# Patient Record
Sex: FEMALE | Race: WHITE | NOT HISPANIC OR LATINO | URBAN - METROPOLITAN AREA
[De-identification: names, ages, dates, MRNs, and addresses within clinical notes are randomized per-mention and may not be internally consistent; named-entity substitution may affect disease eponyms.]

---

## 2018-11-19 ENCOUNTER — EMERGENCY (EMERGENCY)
Facility: HOSPITAL | Age: 83
LOS: 1 days | Discharge: HOME | End: 2018-11-19
Attending: EMERGENCY MEDICINE | Admitting: EMERGENCY MEDICINE
Payer: MEDICARE

## 2018-11-19 VITALS
TEMPERATURE: 97 F | OXYGEN SATURATION: 99 % | HEART RATE: 63 BPM | SYSTOLIC BLOOD PRESSURE: 150 MMHG | RESPIRATION RATE: 18 BRPM | DIASTOLIC BLOOD PRESSURE: 69 MMHG

## 2018-11-19 VITALS
OXYGEN SATURATION: 100 % | DIASTOLIC BLOOD PRESSURE: 79 MMHG | HEART RATE: 65 BPM | TEMPERATURE: 97 F | SYSTOLIC BLOOD PRESSURE: 154 MMHG | RESPIRATION RATE: 16 BRPM

## 2018-11-19 LAB
ALBUMIN SERPL ELPH-MCNC: 3.4 G/DL — SIGNIFICANT CHANGE UP (ref 3.3–5)
ALP SERPL-CCNC: 94 U/L — SIGNIFICANT CHANGE UP (ref 40–120)
ALT FLD-CCNC: 16 U/L — SIGNIFICANT CHANGE UP (ref 4–33)
AST SERPL-CCNC: 26 U/L — SIGNIFICANT CHANGE UP (ref 4–32)
BILIRUB SERPL-MCNC: 0.9 MG/DL — SIGNIFICANT CHANGE UP (ref 0.2–1.2)
BUN SERPL-MCNC: 19 MG/DL — SIGNIFICANT CHANGE UP (ref 7–23)
CALCIUM SERPL-MCNC: 9.4 MG/DL — SIGNIFICANT CHANGE UP (ref 8.4–10.5)
CHLORIDE SERPL-SCNC: 106 MMOL/L — SIGNIFICANT CHANGE UP (ref 98–107)
CO2 SERPL-SCNC: 23 MMOL/L — SIGNIFICANT CHANGE UP (ref 22–31)
CREAT SERPL-MCNC: 0.85 MG/DL — SIGNIFICANT CHANGE UP (ref 0.5–1.3)
GLUCOSE SERPL-MCNC: 95 MG/DL — SIGNIFICANT CHANGE UP (ref 70–99)
MAGNESIUM SERPL-MCNC: 2.1 MG/DL — SIGNIFICANT CHANGE UP (ref 1.6–2.6)
PHOSPHATE SERPL-MCNC: 3.4 MG/DL — SIGNIFICANT CHANGE UP (ref 2.5–4.5)
POTASSIUM SERPL-MCNC: 5.2 MMOL/L — SIGNIFICANT CHANGE UP (ref 3.5–5.3)
POTASSIUM SERPL-SCNC: 5.2 MMOL/L — SIGNIFICANT CHANGE UP (ref 3.5–5.3)
PROT SERPL-MCNC: 6.4 G/DL — SIGNIFICANT CHANGE UP (ref 6–8.3)
SODIUM SERPL-SCNC: 140 MMOL/L — SIGNIFICANT CHANGE UP (ref 135–145)

## 2018-11-19 PROCEDURE — 70450 CT HEAD/BRAIN W/O DYE: CPT | Mod: 26

## 2018-11-19 PROCEDURE — 72125 CT NECK SPINE W/O DYE: CPT | Mod: 26

## 2018-11-19 PROCEDURE — 72170 X-RAY EXAM OF PELVIS: CPT | Mod: 26

## 2018-11-19 PROCEDURE — 99284 EMERGENCY DEPT VISIT MOD MDM: CPT | Mod: 25

## 2018-11-19 PROCEDURE — 12001 RPR S/N/AX/GEN/TRNK 2.5CM/<: CPT | Mod: RT

## 2018-11-19 PROCEDURE — 71046 X-RAY EXAM CHEST 2 VIEWS: CPT | Mod: 26

## 2018-11-19 NOTE — ED ADULT NURSE NOTE - PMH
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

## 2018-11-19 NOTE — ED PROVIDER NOTE - MUSCULOSKELETAL, MLM
difficulty swallowing/other (specify)/shortness of breath
Spine appears normal, range of motion is not limited, no muscle or joint tenderness

## 2018-11-19 NOTE — ED ADULT NURSE NOTE - OBJECTIVE STATEMENT
Pt w/ hx of htn hld mitral valve prolapse received to rm 2 aaox3 poor historian regarding chronic medical conditions  ambulatory w/ walker at baseline c/o mechanical fall with positive impact to head Pt denies anticoagulant use.  Pt reports she was pushing through a door when it gave way and she fell, then was unable to get up due to weakness.  Pt denies LOC confusion numbness tingling loss of ROM  hip pain back pain.  Pt p/w NAD breaths even unlabored skin warm dry intact with noted exceptions at right biceps small superficial skin tears with large hematoma, smaller hematomas to right forearm, and right posterior ribs, small superficial laceration under chin, raised hematoma over left posterior head is tender to palpation.  Chronic bilateral LE swelling small superficial skin tears over red scaly skin noted.  Will endorse report josi [email or web address]

## 2018-11-19 NOTE — ED PROVIDER NOTE - OBJECTIVE STATEMENT
Patient reached out to open door and fell, striking head.  No LOC now with head pain and posterior swelling, Bleeding from skin tear of right bicep.  no bony tenderness and from of that arm.  Patient denies dizziness, chest pain or SOB.

## 2018-11-19 NOTE — ED ADULT TRIAGE NOTE - CHIEF COMPLAINT QUOTE
pt BIBA from home, pt had unwitnessed fall.  has a lump to the back of head, unknown LOC.  pt denies taking any meds

## 2018-11-19 NOTE — ED PROVIDER NOTE - CARE PLAN
Principal Discharge DX:	Contusion of scalp, initial encounter  Secondary Diagnosis:	Laceration of right upper extremity, initial encounter

## 2018-11-19 NOTE — ED PROVIDER NOTE - MEDICAL DECISION MAKING DETAILS
[place]: mechanical fall, +head trauma. not syncope.  Will ct and make sure to check gait. may glue rt arm lac.

## 2019-11-08 ENCOUNTER — INPATIENT (INPATIENT)
Facility: HOSPITAL | Age: 84
LOS: 9 days | Discharge: NURSING, REHAB OR LONG-TERM CARE | DRG: 674 | End: 2019-11-18
Attending: INTERNAL MEDICINE | Admitting: INTERNAL MEDICINE
Payer: MEDICARE

## 2019-11-08 VITALS
RESPIRATION RATE: 20 BRPM | HEART RATE: 86 BPM | TEMPERATURE: 99 F | OXYGEN SATURATION: 94 % | DIASTOLIC BLOOD PRESSURE: 92 MMHG | SYSTOLIC BLOOD PRESSURE: 145 MMHG

## 2019-11-08 PROCEDURE — 93010 ELECTROCARDIOGRAM REPORT: CPT

## 2019-11-08 PROCEDURE — 99285 EMERGENCY DEPT VISIT HI MDM: CPT | Mod: GC

## 2019-11-08 RX ORDER — TETANUS TOXOID, REDUCED DIPHTHERIA TOXOID AND ACELLULAR PERTUSSIS VACCINE, ADSORBED 5; 2.5; 8; 8; 2.5 [IU]/.5ML; [IU]/.5ML; UG/.5ML; UG/.5ML; UG/.5ML
0.5 SUSPENSION INTRAMUSCULAR ONCE
Refills: 0 | Status: COMPLETED | OUTPATIENT
Start: 2019-11-08 | End: 2019-11-08

## 2019-11-08 NOTE — ED PROVIDER NOTE - DURATION
Daily Note     Today's date: 2019  Patient name: Pita Daniel  : 1967  MRN: [id number]  Referring provider: Latoya Gomez MD  Dx:   Encounter Diagnosis     ICD-10-CM    1  Chronic pain of right ankle M25 571     G89 29                 Subjective: Ben Lynne reported "I am doing excellent, this is the best I have been doing so far "    Objective: See treatment diary below  Precautions: none    Daily Treatment Diary     Manual  2019   IASTM  JR JR          Overpressure REIP             Ankle distraction         D/Michael    STM lumbar spine             Manual Lumbar flex  [facility] [place]   Lateral hip distraction Radha Christianson         Exercise Diary              REIP             Self ankle DF mobs             Neural glides        3x10 3x10 3x10   TrA contractions        3x10 3x10    Calf stretch        3x20" 3x20"    Seated Hamstring stretch             Double knee to chest 5x10 4x10  3x10 3x10 3x10  5x10 5x10 5x10   Standing Right hip flexion lumbar flexion  JR      2x10 D/Michael    X-walks  Red 2x15ft   Green 2x15 Green 2x15       Heel raises    3x8 3x8 3x8       Standing calf stretch    3x15" 3x15" 3x15"                                                                                                               HEP return demonstration and progression                 Modalities                                                         Assessment: Tolerated treatment well  Patient would benefit from continued PT  Ben Lynne demonstrated good understanding of her HEP  Due to her reported progress and independence with her HEP assess for D/C next session as long as improvement continues  Plan: Continue per plan of care  today

## 2019-11-08 NOTE — ED PROVIDER NOTE - UNABLE TO OBTAIN
**ATTENDING ADDENDUM (Dr. Mati Davison): Exploration of CC, HPI and review of systems limited by patient's acuity and chronic dementia. Dementia

## 2019-11-08 NOTE — ED PROVIDER NOTE - CONSTITUTIONAL MENTATION
**ATTENDING ADDENDUM (Dr. Mati Davison): follows commands, responds appropriately, cooperative and interactive./awake/alert

## 2019-11-08 NOTE — ED PROVIDER NOTE - PHYSICAL EXAMINATION
Vitals: WNL  Gen: laying comfortably in NAD  Head: NCAT  ENT: EOMI, pupils equal, nonreactive b/l, sclerae white, anicterus, moist mucous membranes. No exudates. hematoma on R forehead, erythema of R cheek without obvious deformities, no nasal septal hematoma b/l, no cspine midline tenderness, full cspine rom  CV: RRR. Audible S1 and S2. No murmurs, rubs, gallops, S3, nor S4, 2+ radial and DP pulses   Pulm: Clear to auscultation bilaterally. No wheezes, rales, or rhonchi  Abd: soft, normoactive BS x4, NTND, no rebound, no guarding, no rashes  Musculoskeletal:  3+ pitting edema RLE with erythema up to level of mid calf with ttp  Skin: no lesions or scars noted  Neurologic: answers questions appropriately, CN2-12 intact, finger to nose intact, motor/sensation extremities x4 grossly intact  : no CVA tenderness  Psych: normal affect Vitals: WNL  Gen: laying comfortably in NAD  Head: NCAT  ENT: EOMI, pupils equal, nonreactive b/l, sclerae white, anicterus, moist mucous membranes. No exudates. hematoma on R forehead, erythema of R cheek without obvious deformities, no nasal septal hematoma b/l, no cspine midline tenderness, full cspine rom  CV: RRR. Audible S1 and S2. No murmurs, rubs, gallops, S3, nor S4, 2+ radial and DP pulses   Pulm: Clear to auscultation bilaterally. No wheezes, rales, or rhonchi  Abd: soft, normoactive BS x4, NTND, no rebound, no guarding, no rashes  Musculoskeletal:  3+ pitting edema RLE with erythema up to level of mid calf with ttp  Skin: no lesions or scars noted  Neurologic: answers questions appropriately, CN2-12 intact, finger to nose intact, motor/sensation extremities x4 grossly intact  : no CVA tenderness  Psych: normal affect  **ATTENDING ADDENDUM (Dr. Mati Davison): I have reviewed and substantially contributed to the elements of the PE as documented above. I have directly performed an examination of this patient in conjunction with the other members (EM resident/PA/NP) of the patient care team. Of note, and in addition to the above, [place] coma score = 15 (eyes =4, verbal =5, motor =6). NO posturing. NO focal motor weakness. NO sensory changes. Awake, alert, coherent, and interactive.

## 2019-11-08 NOTE — ED PROVIDER NOTE - FAMILY DETAILS FREE TEXT FOR MDM ADDL HISTORY OBTAINED FROM QUESTION
**ATTENDING ADDENDUM (Dr. Mati Davison): family member(s) present during patient's ED visit; corroborated CC, HPI and review of systems as provided by patient.

## 2019-11-08 NOTE — ED PROVIDER NOTE - OBJECTIVE STATEMENT
89F, ?dementia presents s/p fall. pt reports she loss her balance on walker, fell forward hitting head on carpet, no LOC, no asa/ac use. Has not tried to ambulate since. At baseline has RLE swelling which she is supopsed to be taking lasix and torsemide for but has not taken it for months as she does not like having to go to the bathroom all the time. reports RLE redness is new but does not know how long it has been present. + worsening RLE swelling. denies f/c, cp,sob, cough, urinary symptoms, HA, visual changes, hip pain. 89F, ?dementia presents s/p fall. pt reports she loss her balance on walker, fell forward hitting head on carpet, no LOC, no asa/ac use. Has not tried to ambulate since. At baseline has RLE swelling which she is supopsed to be taking lasix and torsemide for but has not taken it for months as she does not like having to go to the bathroom all the time. reports RLE redness is new but does not know how long it has been present. + worsening RLE swelling. denies f/c, cp,sob, cough, urinary symptoms, HA, visual changes, hip pain.  **ATTENDING ADDENDUM (Dr. Mati Davison): I attest that I have directly and personally interviewed and examined this patient and elicited a comparable history of present illness and review of systems as documented, along with my EM resident. I attest that I have made significant contributions to the documentation where necessary and as noted in the EMR.

## 2019-11-08 NOTE — ED PROVIDER NOTE - EYES, MLM
Clear bilaterally, pupils equal, round and reactive to light. **ATTENDING ADDENDUM (Dr. Mati Davison): Extraocular muscle movements intact. Clear corneas bilaterally, pupils equal and round. NO nystagmus. POSITIVE periorbital ecchymoses noted.

## 2019-11-08 NOTE — ED PROVIDER NOTE - MUSCULOSKELETAL, MLM
Spine appears normal, range of motion is not limited **ATTENDING ADDENDUM (Dr. Mati Davison): NO costovertebral angle tenderness. NO cervical-thoracic-lumbar-sacral midline bony tenderness or stepoff noted. Pelvis without deformity or tenderness.

## 2019-11-08 NOTE — ED PROVIDER NOTE - ENMT, MLM
Airway patent, Nasal mucosa clear. Mouth with normal mucosa. Throat has no vesicles, no oropharyngeal exudates and uvula is midline. **ATTENDING ADDENDUM (Dr. Mati Davison): AIRWAY CLEAR. NO pooling of secretions, POSITIVE gag reflex, NO debris, ABLE TO SELF-PROTECT AIRWAY. POSITIVE full range of motion of the mandible. NO temporomandibular joint tenderness with range of motion or palpation. NO dental trauma. NO malocclusion. NO tenderness along the nasal bones or facial bones.

## 2019-11-08 NOTE — ED ADULT TRIAGE NOTE - CHIEF COMPLAINT QUOTE
head injury s/p trip and fall on carpet at home, daughter denies LOC. Also currently has lower leg cellulitis going on. head injury s/p trip and fall on carpet at home, daughter denies LOC and blood thinners. Also currently has lower leg cellulitis going on.

## 2019-11-08 NOTE — ED PROVIDER NOTE - PLAN OF CARE
**ATTENDING ADDENDUM (Dr. Mati Davison): Goals of care include resolution of emergent/urgent symptoms and concerns, and restoration to baseline level of homeostasis.

## 2019-11-08 NOTE — ED ADULT NURSE NOTE - CHIEF COMPLAINT QUOTE
head injury s/p trip and fall on carpet at home, daughter denies LOC and blood thinners. Also currently has lower leg cellulitis going on.

## 2019-11-08 NOTE — ED PROVIDER NOTE - MUSCULOSKELETAL [+], MLM
**ATTENDING ADDENDUM (Dr. Mati Davison): POSITIVE soft-tissue swelling of the right lower extremity with asymmetric edema and erythema

## 2019-11-08 NOTE — ED PROVIDER NOTE - CHIEF COMPLAINT
The patient is a 89y Female complaining of The patient is a 89y Female presenting to the ED s/p fall with right lower extremity soft-tissue swelling (asymmetric) and erythema and sequela of head and body trauma s/p fall (abrasions, bruising, skin tears).

## 2019-11-08 NOTE — ED PROVIDER NOTE - GASTROINTESTINAL, MLM
Abdomen soft, non-tender **ATTENDING ADDENDUM (Dr. Mati Davison): NO guarding, rebound, or rigidity. NO pulsatile or non-pulsatile masses. NO hernias. NO obvious hepatosplenomegaly.

## 2019-11-08 NOTE — ED PROVIDER NOTE - CLINICAL SUMMARY MEDICAL DECISION MAKING FREE TEXT BOX
89F, ?dementia presents s/p fall. pt reports she loss her balance on walker, fell forward hitting head on carpet, no LOC, no asa/ac use. nonfocal neuro exam, given age, will image head. pt also with worsening RLE swelling and erythema, concerns for cellulitis without systemic symptoms. also with RLE swelling wrosening, will r/o DVT 89F, ?dementia presents s/p fall. pt reports she loss her balance on walker, fell forward hitting head on carpet, no LOC, no asa/ac use. nonfocal neuro exam, given age, will image head. pt also with worsening RLE swelling and erythema, concerns for cellulitis without systemic symptoms. also with RLE swelling wrosening, will r/o deep venous thrombosis  **ATTENDING MEDICAL DECISION MAKING/SYNTHESIS (Dr. Mati Davison): I have reviewed the Chief Complaint, the HPI, the ROS, and have directly performed and confirmed the findings on the Physical Examination. I have reviewed the medical decision making with all providers, as applicable. The PROBLEM REPRESENTATION at this time is: 89-year-old woman with history of dementia and other medical problems as noted in the EMR presents today s/p trip and (likely mechanical) fall (in context of history of recent and frequent falls) and with soft-tissue swelling and erythema of the right lower extremity. The MOST LIKELY DIAGNOSIS, and the LIST OF DIFFERENTIAL DIAGNOSES, includes (but is not limited to) the following: LEG SWELLING: cellulitis (likely), necrotizing fasciitis, serious bacterial infection or sepsis/severe sepsis/shock, deep venous thrombosis or equivalent, arterial injury, hematoma; SEQUELA OF FALLS: cord/spine injury (unlikely given presentation and clinical findings), intracerebral hemorrhage (NO evidence), intra-thoracic hemorrhage (hemothorax), rib fracture(s) or flail chest, intra-abdominal hemorrhage (hemoperitoneum), pelvis or other extremity injury, pneumothorax, hemoperitoneum (NO evidence of any of the latter conditions), concussion (possible), contusions (likely, mild), sprain/strain (possible), skin avulsions/abrasions/lacerations. The likelihood of each of these diagnoses has been appropriately considered in the context of this patient's presentation and my evaluation. PLAN: as described in EMR, including diagnostics, therapeutics and consultation as clinically warranted. I will continue to reevaluate the patient, including the results of all testing, and monitor response to therapy throughout the patient's course in the ED.

## 2019-11-08 NOTE — ED PROVIDER NOTE - NS ED ROS FT
Gen: No fever, normal appetite  Eyes: No eye irritation or discharge  ENT: No earpain, congestion, sore throat  Resp: No cough or trouble breathing  Cardiovascular: No chest pain or palpitation  Gastroenteric: No nausea/vomiting, diarrhea, constipation  : No dysuria  MS: +RLE swelling + RLE pain  Skin: No rashes  Neuro: No headache  Remainder negative, except as per the HPI Gen: No fever, normal appetite  Eyes: No eye irritation or discharge  ENT: No earpain, congestion, sore throat  Resp: No cough or trouble breathing  Cardiovascular: No chest pain or palpitation  Gastroenteric: No nausea/vomiting, diarrhea, constipation  : No dysuria  MS: +RLE swelling + RLE pain  Skin: No rashes  Neuro: No headache  Remainder negative, except as per the HPI  **ATTENDING ADDENDUM (Dr. Mati Davison): HPI and past medical/surgical history reviewed as noted in the EMR.

## 2019-11-08 NOTE — ED PROVIDER NOTE - CRANIAL NERVE AND PUPILLARY EXAM
tongue is midline/central vision intact/cranial nerves 2-12 intact/extra-ocular movements intact/gag reflex intact

## 2019-11-08 NOTE — ED PROVIDER NOTE - PROGRESS NOTE DETAILS
**ATTENDING ADDENDUM (Dr. Mati Davison): patient serially evaluated throughout ED course. NO acute deterioration up to this time in the ED. ED diagnostics up to this time acknowledged, reviewed and noted, including CT and labs (mild leukocytosis, mild dehydration based on creatinine and BUN ratio). Concern for cellulitis and urinary tract infection and dehydration as causes for fall. NO intracerebral hemorrhage or new facial fracture at this time. Will continue to observe and monitor closely. Tea Gomez MD: trauma w/u negative. will admit for cellulitis. Tea Gomez MD: trauma w/u negative. will admit for extensive cellulitis. US read pending.     PMD Mati Garcia Tea Gomez MD: trauma w/u negative. will admit for extensive cellulitis. US read pending.   **ATTENDING ADDENDUM (Dr. Mati Davison): patient serially evaluated throughout ED course. NO acute deterioration up to this time in the ED. Agree with above notation by EM resident Jason. ED diagnostics up to this time acknowledged, reviewed and noted. Agree with admission. Will continue to observe and monitor closely until definitive placement is made.   PMD Mati Garcia **ATTENDING ADDENDUM (Dr. Mati Davison): patient serially evaluated throughout ED course. NO acute deterioration up to this time in the ED. Awake, alert and coherent. Anticipatory guidance provided. Will continue to observe and monitor closely until definitive placement is made.

## 2019-11-08 NOTE — ED PROVIDER NOTE - SKIN [+], MLM
ABRASION/RASH/BRUISING/**ATTENDING ADDENDUM (Dr. Mati Davison): POSITIVE erythema of the right lower extremity. POSITIVE skin avulsions on the bilateral arms. POSITIVE bruising on the anterior face (eyes, scalp).

## 2019-11-08 NOTE — ED PROVIDER NOTE - ATTENDING CONTRIBUTION TO CARE
**ATTENDING ADDENDUM (Dr. Mati Davison): I attest that I have directly examined this patient and reviewed and formulated the diagnostic and therapeutic management plan in collaboration with the EM resident. Please see MDM note and remainder of EMR for findings from CC, HPI, ROS, and PE. (Ho)

## 2019-11-08 NOTE — ED PROVIDER NOTE - CARE PLAN
Principal Discharge DX:	Cellulitis  Secondary Diagnosis:	Fall Principal Discharge DX:	Cellulitis  Secondary Diagnosis:	Fall  Secondary Diagnosis:	UTI (urinary tract infection) Principal Discharge DX:	Cellulitis  Goal:	**ATTENDING ADDENDUM (Dr. Mati Davison): Goals of care include resolution of emergent/urgent symptoms and concerns, and restoration to baseline level of homeostasis.  Secondary Diagnosis:	Fall  Secondary Diagnosis:	UTI (urinary tract infection)

## 2019-11-08 NOTE — ED PROVIDER NOTE - RESPIRATORY, MLM
Breath sounds clear and equal bilaterally. **ATTENDING ADDENDUM (Dr. Mati Davison): BREATHING CLEAR. POSITIVE BILATERAL BREATH SOUNDS auscultated. NO stridor, drooling, tripoding, or wheezing. POSITIVE bilateral chest wall expansion WITHOUT crepitus, tenderness, or deformity. POSITIVE midline trachea.

## 2019-11-08 NOTE — ED ADULT NURSE NOTE - NSIMPLEMENTINTERV_GEN_ALL_ED
Implemented All Fall with Harm Risk Interventions:  [place] to call system. Call bell, personal items and telephone within reach. Instruct patient to call for assistance. Room bathroom lighting operational. Non-slip footwear when patient is off stretcher. Physically safe environment: no spills, clutter or unnecessary equipment. Stretcher in lowest position, wheels locked, appropriate side rails in place. Provide visual cue, wrist band, yellow gown, etc. Monitor gait and stability. Monitor for mental status changes and reorient to person, place, and time. Review medications for side effects contributing to fall risk. Reinforce activity limits and safety measures with patient and family. Provide visual clues: red socks.

## 2019-11-08 NOTE — ED ADULT NURSE NOTE - OBJECTIVE STATEMENT
89 yo f pmhx of Asthma, HTN, Glaucoma presents to the ED via EMS s/p fall this evening. PT reports she felt unsteady on her feet and has a new walker has fallen a couple of times this month as per daughter. Daughter at bedside reports pt fell, unwitnessed fall, neighbors heard the fall, states called ems. PT denies LOC. Pt AAOx2 (person and place)  no blurry vision, right facial redness noted, bruising noted to right shoulder, no facial droop, NAD, resp nonlabored, abdomen soft nontender nondistended, pt following commands, RLE noted to be red from foot radiating to the knee, LE edema +2,  no sensory deficits, no numbness/tingling, strong peripheral pulses x 4. Pt denies headache, dizziness, chest pain, palpitations, cough, SOB, abdominal pain, n/v/d, urinary symptoms, fevers, chills, weakness at this time.
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
## 2019-11-09 DIAGNOSIS — N30.00 ACUTE CYSTITIS WITHOUT HEMATURIA: ICD-10-CM

## 2019-11-09 DIAGNOSIS — Z87.39 PERSONAL HISTORY OF OTHER DISEASES OF THE MUSCULOSKELETAL SYSTEM AND CONNECTIVE TISSUE: ICD-10-CM

## 2019-11-09 DIAGNOSIS — K44.9 DIAPHRAGMATIC HERNIA WITHOUT OBSTRUCTION OR GANGRENE: ICD-10-CM

## 2019-11-09 DIAGNOSIS — L03.115 CELLULITIS OF RIGHT LOWER LIMB: ICD-10-CM

## 2019-11-09 DIAGNOSIS — L03.90 CELLULITIS, UNSPECIFIED: ICD-10-CM

## 2019-11-09 DIAGNOSIS — Z29.9 ENCOUNTER FOR PROPHYLACTIC MEASURES, UNSPECIFIED: ICD-10-CM

## 2019-11-09 DIAGNOSIS — Z02.9 ENCOUNTER FOR ADMINISTRATIVE EXAMINATIONS, UNSPECIFIED: ICD-10-CM

## 2019-11-09 LAB
ALBUMIN SERPL ELPH-MCNC: 3.1 G/DL — LOW (ref 3.3–5)
ALBUMIN SERPL ELPH-MCNC: 3.2 G/DL — LOW (ref 3.3–5)
ALP SERPL-CCNC: 189 U/L — HIGH (ref 40–120)
ALP SERPL-CCNC: 211 U/L — HIGH (ref 40–120)
ALT FLD-CCNC: 29 U/L — SIGNIFICANT CHANGE UP (ref 10–45)
ALT FLD-CCNC: 37 U/L — SIGNIFICANT CHANGE UP (ref 10–45)
ANION GAP SERPL CALC-SCNC: 11 MMOL/L — SIGNIFICANT CHANGE UP (ref 5–17)
ANION GAP SERPL CALC-SCNC: 13 MMOL/L — SIGNIFICANT CHANGE UP (ref 5–17)
ANISOCYTOSIS BLD QL: SLIGHT — SIGNIFICANT CHANGE UP
APPEARANCE UR: ABNORMAL
AST SERPL-CCNC: 19 U/L — SIGNIFICANT CHANGE UP (ref 10–40)
AST SERPL-CCNC: 30 U/L — SIGNIFICANT CHANGE UP (ref 10–40)
BACTERIA # UR AUTO: ABNORMAL
BASOPHILS # BLD AUTO: 0 K/UL — SIGNIFICANT CHANGE UP (ref 0–0.2)
BASOPHILS NFR BLD AUTO: 0 % — SIGNIFICANT CHANGE UP (ref 0–2)
BILIRUB SERPL-MCNC: 2.1 MG/DL — HIGH (ref 0.2–1.2)
BILIRUB SERPL-MCNC: 2.3 MG/DL — HIGH (ref 0.2–1.2)
BILIRUB UR-MCNC: NEGATIVE — SIGNIFICANT CHANGE UP
BUN SERPL-MCNC: 26 MG/DL — HIGH (ref 7–23)
BUN SERPL-MCNC: 31 MG/DL — HIGH (ref 7–23)
CALCIUM SERPL-MCNC: 8.9 MG/DL — SIGNIFICANT CHANGE UP (ref 8.4–10.5)
CALCIUM SERPL-MCNC: 9.5 MG/DL — SIGNIFICANT CHANGE UP (ref 8.4–10.5)
CHLORIDE SERPL-SCNC: 103 MMOL/L — SIGNIFICANT CHANGE UP (ref 96–108)
CHLORIDE SERPL-SCNC: 104 MMOL/L — SIGNIFICANT CHANGE UP (ref 96–108)
CO2 SERPL-SCNC: 20 MMOL/L — LOW (ref 22–31)
CO2 SERPL-SCNC: 23 MMOL/L — SIGNIFICANT CHANGE UP (ref 22–31)
COLOR SPEC: YELLOW — SIGNIFICANT CHANGE UP
CREAT SERPL-MCNC: 0.85 MG/DL — SIGNIFICANT CHANGE UP (ref 0.5–1.3)
CREAT SERPL-MCNC: 0.99 MG/DL — SIGNIFICANT CHANGE UP (ref 0.5–1.3)
DIFF PNL FLD: ABNORMAL
EOSINOPHIL # BLD AUTO: 0 K/UL — SIGNIFICANT CHANGE UP (ref 0–0.5)
EOSINOPHIL NFR BLD AUTO: 0 % — SIGNIFICANT CHANGE UP (ref 0–6)
EPI CELLS # UR: 18 — HIGH
GLUCOSE SERPL-MCNC: 123 MG/DL — HIGH (ref 70–99)
GLUCOSE SERPL-MCNC: 142 MG/DL — HIGH (ref 70–99)
GLUCOSE UR QL: NEGATIVE — SIGNIFICANT CHANGE UP
HCT VFR BLD CALC: 35.7 % — SIGNIFICANT CHANGE UP (ref 34.5–45)
HCT VFR BLD CALC: 36.7 % — SIGNIFICANT CHANGE UP (ref 34.5–45)
HGB BLD-MCNC: 11.8 G/DL — SIGNIFICANT CHANGE UP (ref 11.5–15.5)
HGB BLD-MCNC: 11.8 G/DL — SIGNIFICANT CHANGE UP (ref 11.5–15.5)
HYALINE CASTS # UR AUTO: 8 /LPF — HIGH (ref 0–7)
KETONES UR-MCNC: NEGATIVE — SIGNIFICANT CHANGE UP
LEUKOCYTE ESTERASE UR-ACNC: ABNORMAL
LYMPHOCYTES # BLD AUTO: 0.38 K/UL — LOW (ref 1–3.3)
LYMPHOCYTES # BLD AUTO: 3.5 % — LOW (ref 13–44)
MACROCYTES BLD QL: SLIGHT — SIGNIFICANT CHANGE UP
MANUAL SMEAR VERIFICATION: SIGNIFICANT CHANGE UP
MCHC RBC-ENTMCNC: 32.1 PG — SIGNIFICANT CHANGE UP (ref 27–34)
MCHC RBC-ENTMCNC: 32.2 GM/DL — SIGNIFICANT CHANGE UP (ref 32–36)
MCHC RBC-ENTMCNC: 33.1 GM/DL — SIGNIFICANT CHANGE UP (ref 32–36)
MCHC RBC-ENTMCNC: 33.4 PG — SIGNIFICANT CHANGE UP (ref 27–34)
MCV RBC AUTO: 101.1 FL — HIGH (ref 80–100)
MCV RBC AUTO: 99.7 FL — SIGNIFICANT CHANGE UP (ref 80–100)
MONOCYTES # BLD AUTO: 0.38 K/UL — SIGNIFICANT CHANGE UP (ref 0–0.9)
MONOCYTES NFR BLD AUTO: 3.5 % — SIGNIFICANT CHANGE UP (ref 2–14)
NEUTROPHILS # BLD AUTO: 10.12 K/UL — HIGH (ref 1.8–7.4)
NEUTROPHILS NFR BLD AUTO: 93 % — HIGH (ref 43–77)
NITRITE UR-MCNC: POSITIVE
NRBC # BLD: 0 /100 WBCS — SIGNIFICANT CHANGE UP (ref 0–0)
PH UR: 5.5 — SIGNIFICANT CHANGE UP (ref 5–8)
PLAT MORPH BLD: NORMAL — SIGNIFICANT CHANGE UP
PLATELET # BLD AUTO: 199 K/UL — SIGNIFICANT CHANGE UP (ref 150–400)
PLATELET # BLD AUTO: 215 K/UL — SIGNIFICANT CHANGE UP (ref 150–400)
POTASSIUM SERPL-MCNC: 3.3 MMOL/L — LOW (ref 3.5–5.3)
POTASSIUM SERPL-MCNC: 3.5 MMOL/L — SIGNIFICANT CHANGE UP (ref 3.5–5.3)
POTASSIUM SERPL-SCNC: 3.3 MMOL/L — LOW (ref 3.5–5.3)
POTASSIUM SERPL-SCNC: 3.5 MMOL/L — SIGNIFICANT CHANGE UP (ref 3.5–5.3)
PROT SERPL-MCNC: 6.2 G/DL — SIGNIFICANT CHANGE UP (ref 6–8.3)
PROT SERPL-MCNC: 6.7 G/DL — SIGNIFICANT CHANGE UP (ref 6–8.3)
PROT UR-MCNC: ABNORMAL
RBC # BLD: 3.53 M/UL — LOW (ref 3.8–5.2)
RBC # BLD: 3.68 M/UL — LOW (ref 3.8–5.2)
RBC # FLD: 14.2 % — SIGNIFICANT CHANGE UP (ref 10.3–14.5)
RBC # FLD: 14.4 % — SIGNIFICANT CHANGE UP (ref 10.3–14.5)
RBC BLD AUTO: SIGNIFICANT CHANGE UP
RBC CASTS # UR COMP ASSIST: 9 /HPF — HIGH (ref 0–4)
SODIUM SERPL-SCNC: 136 MMOL/L — SIGNIFICANT CHANGE UP (ref 135–145)
SODIUM SERPL-SCNC: 138 MMOL/L — SIGNIFICANT CHANGE UP (ref 135–145)
SP GR SPEC: 1.02 — SIGNIFICANT CHANGE UP (ref 1.01–1.02)
UROBILINOGEN FLD QL: NEGATIVE — SIGNIFICANT CHANGE UP
WBC # BLD: 10.88 K/UL — HIGH (ref 3.8–10.5)
WBC # BLD: 11.38 K/UL — HIGH (ref 3.8–10.5)
WBC # FLD AUTO: 10.88 K/UL — HIGH (ref 3.8–10.5)
WBC # FLD AUTO: 11.38 K/UL — HIGH (ref 3.8–10.5)
WBC UR QL: 102 /HPF — HIGH (ref 0–5)

## 2019-11-09 PROCEDURE — 71045 X-RAY EXAM CHEST 1 VIEW: CPT | Mod: 26

## 2019-11-09 PROCEDURE — 70450 CT HEAD/BRAIN W/O DYE: CPT | Mod: 26

## 2019-11-09 PROCEDURE — 99223 1ST HOSP IP/OBS HIGH 75: CPT | Mod: AI

## 2019-11-09 PROCEDURE — 93971 EXTREMITY STUDY: CPT | Mod: 26

## 2019-11-09 PROCEDURE — 70486 CT MAXILLOFACIAL W/O DYE: CPT | Mod: 26

## 2019-11-09 PROCEDURE — 76377 3D RENDER W/INTRP POSTPROCES: CPT | Mod: 26

## 2019-11-09 RX ORDER — CEFAZOLIN SODIUM 1 G
VIAL (EA) INJECTION
Refills: 0 | Status: DISCONTINUED | OUTPATIENT
Start: 2019-11-09 | End: 2019-11-09

## 2019-11-09 RX ORDER — CEFAZOLIN SODIUM 1 G
1000 VIAL (EA) INJECTION ONCE
Refills: 0 | Status: DISCONTINUED | OUTPATIENT
Start: 2019-11-09 | End: 2019-11-09

## 2019-11-09 RX ORDER — HEPARIN SODIUM 5000 [USP'U]/ML
5000 INJECTION INTRAVENOUS; SUBCUTANEOUS EVERY 12 HOURS
Refills: 0 | Status: DISCONTINUED | OUTPATIENT
Start: 2019-11-09 | End: 2019-11-18

## 2019-11-09 RX ORDER — POTASSIUM CHLORIDE 20 MEQ
20 PACKET (EA) ORAL ONCE
Refills: 0 | Status: COMPLETED | OUTPATIENT
Start: 2019-11-09 | End: 2019-11-09

## 2019-11-09 RX ORDER — CEFAZOLIN SODIUM 1 G
1000 VIAL (EA) INJECTION EVERY 8 HOURS
Refills: 0 | Status: DISCONTINUED | OUTPATIENT
Start: 2019-11-09 | End: 2019-11-09

## 2019-11-09 RX ORDER — PANTOPRAZOLE SODIUM 20 MG/1
40 TABLET, DELAYED RELEASE ORAL
Refills: 0 | Status: DISCONTINUED | OUTPATIENT
Start: 2019-11-09 | End: 2019-11-18

## 2019-11-09 RX ORDER — ACETAMINOPHEN 500 MG
650 TABLET ORAL EVERY 6 HOURS
Refills: 0 | Status: DISCONTINUED | OUTPATIENT
Start: 2019-11-09 | End: 2019-11-18

## 2019-11-09 RX ORDER — CEPHALEXIN 500 MG
500 CAPSULE ORAL ONCE
Refills: 0 | Status: COMPLETED | OUTPATIENT
Start: 2019-11-09 | End: 2019-11-09

## 2019-11-09 RX ORDER — CEFAZOLIN SODIUM 1 G
500 VIAL (EA) INJECTION EVERY 12 HOURS
Refills: 0 | Status: DISCONTINUED | OUTPATIENT
Start: 2019-11-09 | End: 2019-11-11

## 2019-11-09 RX ADMIN — Medication 20 MILLIEQUIVALENT(S): at 17:09

## 2019-11-09 RX ADMIN — HEPARIN SODIUM 5000 UNIT(S): 5000 INJECTION INTRAVENOUS; SUBCUTANEOUS at 17:09

## 2019-11-09 RX ADMIN — Medication 100 MILLIGRAM(S): at 17:09

## 2019-11-09 RX ADMIN — Medication 500 MILLIGRAM(S): at 04:46

## 2019-11-09 RX ADMIN — TETANUS TOXOID, REDUCED DIPHTHERIA TOXOID AND ACELLULAR PERTUSSIS VACCINE, ADSORBED 0.5 MILLILITER(S): 5; 2.5; 8; 8; 2.5 SUSPENSION INTRAMUSCULAR at 01:13

## 2019-11-09 RX ADMIN — Medication 100 MILLIGRAM(S): at 01:14

## 2019-11-09 NOTE — H&P ADULT - NSHPLABSRESULTS_GEN_ALL_CORE
WBC Count: 10.88 K/uL <H> ( 00:17)  RBC Count: 3.68 M/uL <L> ( 00:17)  Hemoglobin: 11.8 g/dL ( 00:17)  Hematocrit: 36.7 % ( 00:17)  Platelet Count - Automated: 215 K/uL ( 00:17)          136  |  103  |  31<H>  ----------------------------<  123<H>  3.5   |  20<L>  |  0.99    Ca    9.5      2019 00:17    TPro  6.7  /  Alb  3.2<L>  /  TBili  2.1<H>  /  DBili  x   /  AST  30  /  ALT  37  /  AlkPhos  211<H>                      Urinalysis Basic - ( 2019 00:18 )    Color: Yellow / Appearance: Slightly Turbid / S.022 / pH: x  Gluc: x / Ketone: Negative  / Bili: Negative / Urobili: Negative   Blood: x / Protein: 100 mg/dL / Nitrite: Positive   Leuk Esterase: Small / RBC: 9 /hpf /  /HPF   Sq Epi: x / Non Sq Epi: 18 / Bacteria: Many        Albumin, Serum: 3.2 g/dL <L> ( 00:17)  Bilirubin Total, Serum: 2.1 mg/dL <H> ( 00:17)  Aspartate Aminotransferase (AST/SGOT): 30 U/L ( 00:17)  Alanine Aminotransferase (ALT/SGPT): 37 U/L ( 00:17)  Alkaline Phosphatase, Serum: 211 U/L <H> ( 00:17)            CT brain/maxillo-facial: no ICH, old nasal fracture

## 2019-11-09 NOTE — H&P ADULT - ATTENDING COMMENTS
await collateral info from family.  d/w medicine NP plan of care.    Brent Gardner MD, MHA, FACP, [facility]  Pager: [phone]  If no response or off-hours, page [phone]

## 2019-11-09 NOTE — H&P ADULT - NSICDXPASTMEDICALHX_GEN_ALL_CORE_FT
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

## 2019-11-09 NOTE — H&P ADULT - NSHPPHYSICALEXAM_GEN_ALL_CORE
PHYSICAL EXAM:  Vital Signs Last 24 Hrs  T(C): 36.8 (11-09-19 @ 09:15), Max: 37.2 (11-08-19 @ 22:36)  T(F): 98.3 (11-09-19 @ 09:15), Max: 98.9 (11-08-19 @ 22:36)  HR: 73 (11-09-19 @ 09:15) (73 - 87)  BP: 126/80 (11-09-19 @ 09:15) (126/80 - 146/73)  BP(mean): --  RR: 18 (11-09-19 @ 09:15) (18 - 20)  SpO2: 95% (11-09-19 @ 09:15) (94% - 99%)  GENERAL: No apparent distress, appears stated age  HEAD:  right forehead hematoma and shanta-orbital bruise  EYES: Conjunctiva and sclera clear, no discharge  ENMT: Moist mucous membranes, no nasal discharge  NECK: Supple, no JVD  CHEST/LUNG: Clear to auscultation; no rales, wheeze or rubs  HEART: Regular rate and rhythm; no murmurs, rubs or gallops  ABDOMEN: Soft, Nontender, Nondistended; Bowel sounds present  EXTREMITIES:  No clubbing, cyanosis but right leg has chronic 2+ leg edema with erythema likely c/w cellulitis  SKIN: No rash or new discoloration  NERVOUS SYSTEM:  Alert & Oriented*2; Bilateral lower extremity mobile, sensation to light touch intact

## 2019-11-09 NOTE — H&P ADULT - PROBLEM SELECTOR PLAN 7
Transitions of Care Status:  1.  Name of PCP: unknown  2.  PCP Contacted on Admission: [ ] Y    [x ] N    3.  PCP contacted at Discharge: [ ] Y    [ ] N    [ ] N/A  4.  Post-Discharge Appointment Date and Location:  5.  Summary of Handoff given to PCP:

## 2019-11-09 NOTE — H&P ADULT - NSICDXPASTSURGICALHX_GEN_ALL_CORE_FT
PAST SURGICAL HISTORY:  Bilateral Cataracts removed 2010     S/P Colonoscopy     S/P Hip Replacement  2002

## 2019-11-09 NOTE — H&P ADULT - HISTORY OF PRESENT ILLNESS
89 f h/o GERD p/w mechanical fall and head trauma at home.  In ER, found to be dehydrated, and diagnosed with UTI, Cellulitis.  Poor historian; family not reachable currently.  No dizziness or LOC.  Given Keflex and Clindamycin in ER.

## 2019-11-09 NOTE — PROVIDER CONTACT NOTE (OTHER) - ACTION/TREATMENT ORDERED:
As per GABRIELLA Kendall, since pt voided 400cc, bladder scan no longer indicated at this time. no further interventions required at this time. Will continue to monitor.

## 2019-11-10 LAB
ALBUMIN SERPL ELPH-MCNC: 2.7 G/DL — LOW (ref 3.3–5)
ALP SERPL-CCNC: 213 U/L — HIGH (ref 40–120)
ALT FLD-CCNC: 29 U/L — SIGNIFICANT CHANGE UP (ref 10–45)
ANION GAP SERPL CALC-SCNC: 9 MMOL/L — SIGNIFICANT CHANGE UP (ref 5–17)
AST SERPL-CCNC: 20 U/L — SIGNIFICANT CHANGE UP (ref 10–40)
BILIRUB DIRECT SERPL-MCNC: 0.6 MG/DL — HIGH (ref 0–0.2)
BILIRUB INDIRECT FLD-MCNC: 1.3 MG/DL — HIGH (ref 0.2–1)
BILIRUB SERPL-MCNC: 1.9 MG/DL — HIGH (ref 0.2–1.2)
BILIRUB SERPL-MCNC: 1.9 MG/DL — HIGH (ref 0.2–1.2)
BUN SERPL-MCNC: 16 MG/DL — SIGNIFICANT CHANGE UP (ref 7–23)
CALCIUM SERPL-MCNC: 8.9 MG/DL — SIGNIFICANT CHANGE UP (ref 8.4–10.5)
CHLORIDE SERPL-SCNC: 105 MMOL/L — SIGNIFICANT CHANGE UP (ref 96–108)
CO2 SERPL-SCNC: 22 MMOL/L — SIGNIFICANT CHANGE UP (ref 22–31)
CREAT SERPL-MCNC: 0.82 MG/DL — SIGNIFICANT CHANGE UP (ref 0.5–1.3)
GLUCOSE SERPL-MCNC: 99 MG/DL — SIGNIFICANT CHANGE UP (ref 70–99)
HAPTOGLOB SERPL-MCNC: 268 MG/DL — HIGH (ref 34–200)
HCT VFR BLD CALC: 34.7 % — SIGNIFICANT CHANGE UP (ref 34.5–45)
HGB BLD-MCNC: 11.3 G/DL — LOW (ref 11.5–15.5)
LDH SERPL L TO P-CCNC: 189 U/L — SIGNIFICANT CHANGE UP (ref 50–242)
MCHC RBC-ENTMCNC: 32.6 GM/DL — SIGNIFICANT CHANGE UP (ref 32–36)
MCHC RBC-ENTMCNC: 33.2 PG — SIGNIFICANT CHANGE UP (ref 27–34)
MCV RBC AUTO: 102.1 FL — HIGH (ref 80–100)
NT-PROBNP SERPL-SCNC: 4000 PG/ML — HIGH (ref 0–300)
PLATELET # BLD AUTO: 235 K/UL — SIGNIFICANT CHANGE UP (ref 150–400)
POTASSIUM SERPL-MCNC: 3.3 MMOL/L — LOW (ref 3.5–5.3)
POTASSIUM SERPL-SCNC: 3.3 MMOL/L — LOW (ref 3.5–5.3)
PROCALCITONIN SERPL-MCNC: 1.22 NG/ML — HIGH (ref 0.02–0.1)
PROT SERPL-MCNC: 6 G/DL — SIGNIFICANT CHANGE UP (ref 6–8.3)
RBC # BLD: 3.4 M/UL — LOW (ref 3.8–5.2)
RBC # FLD: 14.1 % — SIGNIFICANT CHANGE UP (ref 10.3–14.5)
SODIUM SERPL-SCNC: 136 MMOL/L — SIGNIFICANT CHANGE UP (ref 135–145)
WBC # BLD: 8.32 K/UL — SIGNIFICANT CHANGE UP (ref 3.8–10.5)
WBC # FLD AUTO: 8.32 K/UL — SIGNIFICANT CHANGE UP (ref 3.8–10.5)

## 2019-11-10 RX ORDER — POTASSIUM CHLORIDE 20 MEQ
40 PACKET (EA) ORAL ONCE
Refills: 0 | Status: COMPLETED | OUTPATIENT
Start: 2019-11-10 | End: 2019-11-10

## 2019-11-10 RX ORDER — CALCIUM CARBONATE 500(1250)
1 TABLET ORAL ONCE
Refills: 0 | Status: COMPLETED | OUTPATIENT
Start: 2019-11-10 | End: 2019-11-10

## 2019-11-10 RX ADMIN — HEPARIN SODIUM 5000 UNIT(S): 5000 INJECTION INTRAVENOUS; SUBCUTANEOUS at 06:49

## 2019-11-10 RX ADMIN — Medication 100 MILLIGRAM(S): at 17:43

## 2019-11-10 RX ADMIN — Medication 1 TABLET(S): at 14:38

## 2019-11-10 RX ADMIN — PANTOPRAZOLE SODIUM 40 MILLIGRAM(S): 20 TABLET, DELAYED RELEASE ORAL at 06:49

## 2019-11-10 RX ADMIN — Medication 100 MILLIGRAM(S): at 06:50

## 2019-11-10 RX ADMIN — HEPARIN SODIUM 5000 UNIT(S): 5000 INJECTION INTRAVENOUS; SUBCUTANEOUS at 17:44

## 2019-11-10 RX ADMIN — Medication 40 MILLIEQUIVALENT(S): at 14:33

## 2019-11-10 NOTE — PROGRESS NOTE ADULT - SUBJECTIVE AND OBJECTIVE BOX
SUBJECTIVE / OVERNIGHT EVENTS: pt denies chest pain, shortness of breath       MEDICATIONS  (STANDING):  ceFAZolin   IVPB 500 milliGRAM(s) IV Intermittent every 12 hours  heparin  Injectable 5000 Unit(s) SubCutaneous every 12 hours  pantoprazole    Tablet 40 milliGRAM(s) Oral before breakfast    MEDICATIONS  (PRN):  acetaminophen   Tablet .. 650 milliGRAM(s) Oral every 6 hours PRN Temp greater or equal to 38C (100.4F), Mild Pain (1 - 3)    Vital Signs Last 24 Hrs  T(C): 37 (10 Nov 2019 17:03), Max: 37.1 (10 Nov 2019 13:50)  T(F): 98.6 (10 Nov 2019 17:), Max: 98.8 (10 Nov 2019 13:50)  HR: 74 (10 Nov 2019 17:) (74 - 99)  BP: 164/82 (10 Nov 2019 17:) (119/66 - 164/82)  BP(mean): --  RR: 18 (10 Nov 2019 17:) (18 - 18)  SpO2: 97% (10 Nov 2019 17:03) (95% - 97%)    CAPILLARY BLOOD GLUCOSE        I&O's Summary    2019 07:01  -  10 Nov 2019 07:00  --------------------------------------------------------  IN: 1060 mL / OUT: 1000 mL / NET: 60 mL    10 Nov 2019 07:01  -  10 Nov 2019 20:43  --------------------------------------------------------  IN: 1260 mL / OUT: 350 mL / NET: 910 mL        Constitutional: No fever, fatigue  Skin: No rash.  Eyes: No recent vision problems or eye pain.  ENT: No congestion, ear pain, or sore throat.  Cardiovascular: No chest pain or palpation.  Respiratory: No cough, shortness of breath, congestion, or wheezing.  Gastrointestinal: No abdominal pain, nausea, vomiting, or diarrhea.  Genitourinary: No dysuria.  Musculoskeletal: No joint swelling.  Neurologic: No headache.    PHYSICAL EXAM:  GENERAL: NAD  EYES: EOMI, PERRLA  NECK: Supple, No JVD  CHEST/LUNG: dec breath sounds rt base  HEART:  S1 , S2 +  ABDOMEN: soft , bs+  EXTREMITIES:  trace edema+  NEUROLOGY:alert awake oriented      LABS:                        11.3   8.32  )-----------( 235      ( 10 Nov 2019 09:08 )             34.7     11-10    136  |  105  |  16  ----------------------------<  99  3.3<L>   |  22  |  0.82    Ca    8.9      10 Nov 2019 07:32    TPro  6.0  /  Alb  2.7<L>  /  TBili  1.9<H>  /  DBili  0.6<H>  /  AST  20  /  ALT  29  /  AlkPhos  213<H>  11-10          Urinalysis Basic - ( 2019 00:18 )    Color: Yellow / Appearance: Slightly Turbid / S.022 / pH: x  Gluc: x / Ketone: Negative  / Bili: Negative / Urobili: Negative   Blood: x / Protein: 100 mg/dL / Nitrite: Positive   Leuk Esterase: Small / RBC: 9 /hpf /  /HPF   Sq Epi: x / Non Sq Epi: 18 / Bacteria: Many        RADIOLOGY & ADDITIONAL TESTS:    Imaging Personally Reviewed:    Consultant(s) Notes Reviewed:      Care Discussed with Consultants/Other Providers:

## 2019-11-10 NOTE — PHYSICAL THERAPY INITIAL EVALUATION ADULT - GAIT DEVIATIONS NOTED, PT EVAL
decreased step length/decreased stride length/decreased weight-shifting ability/decreased maris/increased time in double stance

## 2019-11-10 NOTE — CONSULT NOTE ADULT - SUBJECTIVE AND OBJECTIVE BOX
Patient is a 89y Female     Patient is a 89y old  Female who presents with a chief complaint of fall (09 Nov 2019 11:56) Noted to have elevation of T Bili and alk phos. No abd pain, weight loss or pruritus. No known h/o liver disease.  No significant medications      HPI:  89 f h/o GERD p/w mechanical fall and head trauma at home.  In ER, found to be dehydrated, and diagnosed with UTI, Cellulitis.  Poor historian; family not reachable currently.  No dizziness or LOC.  Given Keflex and Clindamycin in ER. 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  History of Spinal Stenosis  H/O: Osteoarthritis  Hernia, Hiatal  Asthma  MVP (Mitral Valve Prolapse)  Functional Heart Murmur  HLD (Hyperlipidemia)  H/O: Hypertension  S/P Colonoscopy  Bilateral Cataracts removed 2010  S/P Hip Replacement  2002      MEDICATIONS  (STANDING):  ceFAZolin   IVPB 500 milliGRAM(s) IV Intermittent every 12 hours  heparin  Injectable 5000 Unit(s) SubCutaneous every 12 hours  pantoprazole    Tablet 40 milliGRAM(s) Oral before breakfast      Allergies    sulfa drugs (Hives)    Intolerances        SOCIAL HISTORY:  Denies ETOh,Smoking,     FAMILY HISTORY:      REVIEW OF SYSTEMS:    CONSTITUTIONAL: No weakness, fevers or chills  EYES/ENT: No visual changes;  No vertigo or throat pain   NECK: No pain or stiffness  RESPIRATORY: No cough, wheezing, hemoptysis; No shortness of breath  CARDIOVASCULAR: No chest pain or palpitations  GASTROINTESTINAL: No abdominal or epigastric pain. No nausea, vomiting, or hematemesis; No diarrhea or constipation. No melena or hematochezia.  GENITOURINARY: No dysuria, frequency or hematuria  NEUROLOGICAL: No numbness or weakness  SKIN: No itching, burning, rashes, or lesions   All other review of systems is negative unless indicated above.    VITAL:  T(C): , Max: 36.7 (11-09-19 @ 21:00)  T(F): , Max: 98.1 (11-09-19 @ 21:00)  HR: 93 (11-10-19 @ 05:49)  BP: 152/89 (11-10-19 @ 05:49)  BP(mean): --  RR: 18 (11-10-19 @ 05:49)  SpO2: 95% (11-10-19 @ 05:49)  Wt(kg): --    I and O's:    11-09 @ 07:01  -  11-10 @ 07:00  --------------------------------------------------------  IN: 1060 mL / OUT: 1000 mL / NET: 60 mL          PHYSICAL EXAM:    Constitutional: NAD  HEENT: PERRLA,  right periorbital ecchymosis  ultrasound abdomen  Neck: No JVD ecchymosis  right shoulder  Respiratory: CTA B/L  Cardiovascular: S1 and S2  Gastrointestinal: BS+, soft, NT/ND  Extremities: No peripheral edema  Neurological: A/O x 3, no focal deficits  Psychiatric: Normal mood, normal affect  : No Bunch  Skin: No rashes  Access: Not applicable  Back: No CVA tenderness    LABS:                        11.3   8.32  )-----------( 235      ( 10 Nov 2019 09:08 )             34.7     11-10    136  |  105  |  16  ----------------------------<  99  3.3<L>   |  22  |  0.82    Ca    8.9      10 Nov 2019 07:32    TPro  6.0  /  Alb  2.7<L>  /  TBili  1.9<H>  /  DBili  0.6<H>  /  AST  20  /  ALT  29  /  AlkPhos  213<H>  11-10          RADIOLOGY & ADDITIONAL STUDIES:

## 2019-11-10 NOTE — PHYSICAL THERAPY INITIAL EVALUATION ADULT - ADDITIONAL COMMENTS
Pt lives with her dtr in a private home with 3 steps to enter, 18 steps inside. PTA pt required some assistance with ADLs provided by family as needed. Pt utilizes a walker for amb. Pt lives with her dtr in a private home with 3 steps to enter, 18 steps inside. Pt stated she stays on the first floor and has access to bedroom/bathroom. Pt stated she does not shower and sponge bathes instead. PTA pt required some assistance with ADLs provided by family as needed. Pt utilizes a walker for amb.

## 2019-11-10 NOTE — CONSULT NOTE ADULT - ASSESSMENT
Elevated T. Bili and alk phos r/o biliary obstruction, liver lesions, vs cholestasis.  Ecchymosis can increase bili as well  asymptomatic, no signs of cholangitis  suggest ultrasound of abdomen-further recommendations pending results

## 2019-11-10 NOTE — PHYSICAL THERAPY INITIAL EVALUATION ADULT - GENERAL OBSERVATIONS, REHAB EVAL
Pt is a 89 y/o female with PMHx of GERD p/w mechanical fall and head trauma at home. In ER, found to be dehydrated, and diagnosed with UTI, Cellulitis. Pt received supine in bed, +IV lock, +bed alarm.

## 2019-11-10 NOTE — PHYSICAL THERAPY INITIAL EVALUATION ADULT - PERTINENT HX OF CURRENT PROBLEM, REHAB EVAL
Pt is a 89 y/o female with PMHx of GERD p/w mechanical fall and head trauma at home. In ER, found to be dehydrated, and diagnosed with UTI, Cellulitis.

## 2019-11-10 NOTE — PHYSICAL THERAPY INITIAL EVALUATION ADULT - ACTIVE RANGE OF MOTION EXAMINATION, REHAB EVAL
bilateral  lower extremity Active ROM was WFL (within functional limits)/B shoulder flexion limited to < 90 degrees

## 2019-11-10 NOTE — PHYSICAL THERAPY INITIAL EVALUATION ADULT - MODALITIES TREATMENT COMMENTS
Pt reaching for furniture/bed/PT multiple times instead of keeping hands on handles of RW. VCs throughout all mobility for sequencing of movement. Pt very fearful of falling, PT reassured pt of safety while working with PT. Pt expressed she is concerned over what is happening and why her family has not called. Informed pt is may be since it is still early in the AM. Informed pt of the reason for this hospitalization and recommendation of Subacute Rehab following discharge to optimize function upon return home.

## 2019-11-10 NOTE — PHYSICAL THERAPY INITIAL EVALUATION ADULT - PRECAUTIONS/LIMITATIONS, REHAB EVAL
CT brain 11/9: HEAD CT: No evidence for intracranial hemorrhage, mass effect, or displaced calvarial fracture. Small right anterior frontal scalp hematoma. MAXILLOFACIAL CT: No acute maxillofacial bone fracture. Old right nasal bone fracture.   VA duplex lower ext vein R 11/9: No evidence of right lower extremity deep venous thrombosis. Subcutaneous edema in the lower leg.   Xray chest 11/9: clear lungs. fall precautions/CT brain 11/9: HEAD CT: No evidence for intracranial hemorrhage, mass effect, or displaced calvarial fracture. Small right anterior frontal scalp hematoma. MAXILLOFACIAL CT: No acute maxillofacial bone fracture. Old right nasal bone fracture.   VA duplex lower ext vein R 11/9: No evidence of right lower extremity deep venous thrombosis. Subcutaneous edema in the lower leg.   Xray chest 11/9: clear lungs.

## 2019-11-11 LAB
-  AMIKACIN: SIGNIFICANT CHANGE UP
-  AMPICILLIN/SULBACTAM: SIGNIFICANT CHANGE UP
-  AMPICILLIN: SIGNIFICANT CHANGE UP
-  AZTREONAM: SIGNIFICANT CHANGE UP
-  CEFAZOLIN: SIGNIFICANT CHANGE UP
-  CEFEPIME: SIGNIFICANT CHANGE UP
-  CEFOXITIN: SIGNIFICANT CHANGE UP
-  CEFTRIAXONE: SIGNIFICANT CHANGE UP
-  CIPROFLOXACIN: SIGNIFICANT CHANGE UP
-  GENTAMICIN: SIGNIFICANT CHANGE UP
-  IMIPENEM: SIGNIFICANT CHANGE UP
-  LEVOFLOXACIN: SIGNIFICANT CHANGE UP
-  MEROPENEM: SIGNIFICANT CHANGE UP
-  NITROFURANTOIN: SIGNIFICANT CHANGE UP
-  PIPERACILLIN/TAZOBACTAM: SIGNIFICANT CHANGE UP
-  TIGECYCLINE: SIGNIFICANT CHANGE UP
-  TOBRAMYCIN: SIGNIFICANT CHANGE UP
-  TRIMETHOPRIM/SULFAMETHOXAZOLE: SIGNIFICANT CHANGE UP
ALBUMIN SERPL ELPH-MCNC: 2.8 G/DL — LOW (ref 3.3–5)
ALP SERPL-CCNC: 246 U/L — HIGH (ref 40–120)
ALT FLD-CCNC: 30 U/L — SIGNIFICANT CHANGE UP (ref 10–45)
ANION GAP SERPL CALC-SCNC: 11 MMOL/L — SIGNIFICANT CHANGE UP (ref 5–17)
AST SERPL-CCNC: 25 U/L — SIGNIFICANT CHANGE UP (ref 10–40)
BILIRUB SERPL-MCNC: 1.8 MG/DL — HIGH (ref 0.2–1.2)
BUN SERPL-MCNC: 15 MG/DL — SIGNIFICANT CHANGE UP (ref 7–23)
CALCIUM SERPL-MCNC: 8.4 MG/DL — SIGNIFICANT CHANGE UP (ref 8.4–10.5)
CHLORIDE SERPL-SCNC: 104 MMOL/L — SIGNIFICANT CHANGE UP (ref 96–108)
CO2 SERPL-SCNC: 24 MMOL/L — SIGNIFICANT CHANGE UP (ref 22–31)
CREAT SERPL-MCNC: 0.77 MG/DL — SIGNIFICANT CHANGE UP (ref 0.5–1.3)
CULTURE RESULTS: SIGNIFICANT CHANGE UP
GLUCOSE SERPL-MCNC: 111 MG/DL — HIGH (ref 70–99)
HCT VFR BLD CALC: 34.5 % — SIGNIFICANT CHANGE UP (ref 34.5–45)
HGB BLD-MCNC: 11 G/DL — LOW (ref 11.5–15.5)
MCHC RBC-ENTMCNC: 31.9 GM/DL — LOW (ref 32–36)
MCHC RBC-ENTMCNC: 32.6 PG — SIGNIFICANT CHANGE UP (ref 27–34)
MCV RBC AUTO: 102.4 FL — HIGH (ref 80–100)
METHOD TYPE: SIGNIFICANT CHANGE UP
ORGANISM # SPEC MICROSCOPIC CNT: SIGNIFICANT CHANGE UP
ORGANISM # SPEC MICROSCOPIC CNT: SIGNIFICANT CHANGE UP
PLATELET # BLD AUTO: 240 K/UL — SIGNIFICANT CHANGE UP (ref 150–400)
POTASSIUM SERPL-MCNC: 3.4 MMOL/L — LOW (ref 3.5–5.3)
POTASSIUM SERPL-SCNC: 3.4 MMOL/L — LOW (ref 3.5–5.3)
PROT SERPL-MCNC: 5.6 G/DL — LOW (ref 6–8.3)
RBC # BLD: 3.37 M/UL — LOW (ref 3.8–5.2)
RBC # FLD: 14.1 % — SIGNIFICANT CHANGE UP (ref 10.3–14.5)
SODIUM SERPL-SCNC: 139 MMOL/L — SIGNIFICANT CHANGE UP (ref 135–145)
SPECIMEN SOURCE: SIGNIFICANT CHANGE UP
WBC # BLD: 9.71 K/UL — SIGNIFICANT CHANGE UP (ref 3.8–10.5)
WBC # FLD AUTO: 9.71 K/UL — SIGNIFICANT CHANGE UP (ref 3.8–10.5)

## 2019-11-11 PROCEDURE — 99223 1ST HOSP IP/OBS HIGH 75: CPT

## 2019-11-11 PROCEDURE — 76700 US EXAM ABDOM COMPLETE: CPT | Mod: 26

## 2019-11-11 RX ORDER — CEFAZOLIN SODIUM 1 G
1000 VIAL (EA) INJECTION EVERY 12 HOURS
Refills: 0 | Status: DISCONTINUED | OUTPATIENT
Start: 2019-11-11 | End: 2019-11-15

## 2019-11-11 RX ADMIN — Medication 100 MILLIGRAM(S): at 06:00

## 2019-11-11 RX ADMIN — HEPARIN SODIUM 5000 UNIT(S): 5000 INJECTION INTRAVENOUS; SUBCUTANEOUS at 06:00

## 2019-11-11 RX ADMIN — Medication 100 MILLIGRAM(S): at 18:06

## 2019-11-11 RX ADMIN — HEPARIN SODIUM 5000 UNIT(S): 5000 INJECTION INTRAVENOUS; SUBCUTANEOUS at 18:02

## 2019-11-11 RX ADMIN — PANTOPRAZOLE SODIUM 40 MILLIGRAM(S): 20 TABLET, DELAYED RELEASE ORAL at 06:00

## 2019-11-11 NOTE — CONSULT NOTE ADULT - ASSESSMENT
89 f h/o GERD p/w mechanical fall and head trauma at home.  Pt found to have rt LE cellulitis, abnormal U/A with positive culture finding, transaminitis, leucocytosis.    Plan:   rt LE cellulitis,         abnormal U/A with positive culture finding, 89 f h/o GERD p/w mechanical fall and head trauma at home.  Pt found to have rt LE cellulitis, abnormal U/A with positive culture finding, transaminitis, leucocytosis.      Plan:   rt LE cellulitis:  Continue with cefazolin, increased dose to 1 gm iv q12h   leg elevation      abnormal U/A with positive culture finding,  Pt asymptomatic,   urine cx with klebsiella likely represents asymptomatic bacteriuria.        transaminitis:  trending down,  U/S abdomen with normal biliary tree  work up for findings in pancreas per primary        leucocytosis:  resolved.

## 2019-11-11 NOTE — CONSULT NOTE ADULT - SUBJECTIVE AND OBJECTIVE BOX
Patient is a 89y old  Female who presents with a chief complaint of fall (11 Nov 2019 08:57)      HPI:  89 f h/o GERD p/w mechanical fall and head trauma at home.  In ER, found to be dehydrated, and diagnosed with UTI, Cellulitis.  Poor historian; family not reachable currently.  No dizziness or LOC.  Given Keflex and Clindamycin in ER. (09 Nov 2019 10:21)  Above reviewed: Niece at bedside, pt has some swelling of the rt lower extremity, but developed worsening swelling with increased redness Saturday. She denies any recent infection or abx use in last 6 months. Denies any trauma to the area. Per Niece pt with worsening dementia over last 6-8 months. She denies any abdominal pain, no N/V, + constipation, no urinary symptoms.       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   	  Ophthalmologic: Denies any discharge, redness.  	  ENT: No nasal congestion or throat pain.     Respiratory and Thorax: No cough, sputum. Denies shortness of breath.  	  Cardiovascular: No chest pain, palpitations.    Gastrointestinal: No nausea, abdominal pain or diarrhea.    Genitourinary: No dysuria, frequency. No flank pain.    Musculoskeletal: No joint swelling or pain.    Neurological: No extremity weakness.    Psychiatric: No hallucinations	    Endocrine: No abnormal heat/cold intolerance    Allergic/Immunologic: No hives       Social history:  Lives at home. No smoking.       FAMILY HISTORY:  Pt denies any medical conditions in first degree relatives       Allergies  sulfa drugs (Hives)      Antimicrobials:  ceFAZolin   IVPB 500 milliGRAM(s) IV Intermittent every 12 hours        Vital Signs Last 24 Hrs  T(C): 37.2 (11 Nov 2019 16:44), Max: 37.2 (11 Nov 2019 16:44)  T(F): 98.9 (11 Nov 2019 16:44), Max: 98.9 (11 Nov 2019 16:44)  HR: 92 (11 Nov 2019 16:44) (73 - 92)  BP: 141/77 (11 Nov 2019 16:44) (129/63 - 164/82)  BP(mean): --  RR: 18 (11 Nov 2019 16:44) (18 - 18)  SpO2: 96% (11 Nov 2019 16:44) (95% - 98%)      PHYSICAL EXAM: Patient in no acute distress.    Constitutional: Comfortable. Awake and alert, confused.     Eyes: No discharge, rt periorbital ecchymosis,     ENT: No thrush. No pharyngeal exudate or erythema.    Neck: Supple,     Respiratory: + air entry bilaterally.    Cardiovascular: S1 S2 wnl, No murmurs, intermittent premature beats.     Gastrointestinal: Soft BS(+) no tenderness, non distended.    Genitourinary: No CVA tenderness     Extremities: Rt LE edema with erythema, warmth, extending medially up the thigh.     Vascular: peripheral pulses felt    Neurological: Moving all extremities.    Skin: No rash, ecchymosis b/l upper extremities.     Musculoskeletal: No joint swelling.    Psychiatric: Affect normal.                              11.0   9.71  )-----------( 240      ( 11 Nov 2019 09:25 )             34.5       11-11    139  |  104  |  15  ----------------------------<  111<H>  3.4<L>   |  24  |  0.77    Ca    8.4      11 Nov 2019 07:16    TPro  5.6<L>  /  Alb  2.8<L>  /  TBili  1.8<H>  /  DBili  x   /  AST  25  /  ALT  30  /  AlkPhos  246<H>  11-11      Urinalysis + Microscopic Examination (11.09.19 @ 00:18)    Urine Appearance: Slightly Turbid    Urobilinogen: Negative    Specific Gravity: 1.022    Protein, Urine: 100 mg/dL    pH Urine: 5.5    Leukocyte Esterase Concentration: Small    Nitrite: Positive    Ketone - Urine: Negative    Bilirubin: Negative    Color: Yellow    Glucose Qualitative, Urine: Negative    Blood, Urine: Moderate    Red Blood Cell - Urine: 9 /hpf    White Blood Cell - Urine: 102 /HPF    Epithelial Cells: 18    Hyaline Casts: 8 /LPF    Bacteria: Many      Culture - Urine (collected 09 Nov 2019 03:01)  Source: .Urine Clean Catch (Midstream)  Final Report (11 Nov 2019 11:57):    >100,000 CFU/ml Klebsiella pneumoniae    <10,000 CFU/ml Normal Urogenital lorena present  Organism: Klebsiella pneumoniae (11 Nov 2019 11:57)  Organism: Klebsiella pneumoniae (11 Nov 2019 11:57)      Radiology: Imaging reviewed personally [ x]    < from: Xray Chest 1 View- PORTABLE-Urgent (11.09.19 @ 10:31) >  IMPRESSION: Clear lungs.      < from: VA Duplex Lower Ext Vein Scan, Right (11.09.19 @ 06:13) >  IMPRESSION:   No evidence of right lower extremity deep venous thrombosis.  Subcutaneous edema in the lower leg.      < from: US Abdomen Complete (11.11.19 @ 12:11) >  IMPRESSION:     No sonographic evidence of hepatobiliary disease.    Cluster of small cysts in the pancreatic body. Consider contrast enhanced   MRI for further characterization.

## 2019-11-11 NOTE — PROGRESS NOTE ADULT - SUBJECTIVE AND OBJECTIVE BOX
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 650 milliGRAM(s) Oral every 6 hours PRN Temp greater or equal to 38C (100.4F), Mild Pain (1 - 3)      Vital Signs Last 24 Hrs  T(C): 36.8 (11 Nov 2019 05:58), Max: 37.1 (10 Nov 2019 13:50)  T(F): 98.2 (11 Nov 2019 05:58), Max: 98.8 (10 Nov 2019 13:50)  HR: 86 (11 Nov 2019 05:58) (74 - 95)  BP: 161/82 (11 Nov 2019 05:58) (144/76 - 164/82)  BP(mean): --  RR: 18 (11 Nov 2019 05:58) (18 - 18)  SpO2: 96% (11 Nov 2019 05:58) (95% - 97%)  nc/at  s1s2  cta  soft, nt, nd no guarding or rebound  no c/c/e    CBC Full  -  ( 10 Nov 2019 09:08 )  WBC Count : 8.32 K/uL  RBC Count : 3.40 M/uL  Hemoglobin : 11.3 g/dL  Hematocrit : 34.7 %  Platelet Count - Automated : 235 K/uL  Mean Cell Volume : 102.1 fl  Mean Cell Hemoglobin : 33.2 pg  Mean Cell Hemoglobin Concentration : 32.6 gm/dL  Auto Neutrophil # : x  Auto Lymphocyte # : x  Auto Monocyte # : x  Auto Eosinophil # : x  Auto Basophil # : x  Auto Neutrophil % : x  Auto Lymphocyte % : x  Auto Monocyte % : x  Auto Eosinophil % : x  Auto Basophil % : x    11-11    139  |  104  |  15  ----------------------------<  111<H>  3.4<L>   |  24  |  0.77    Ca    8.4      11 Nov 2019 07:16    TPro  5.6<L>  /  Alb  2.8<L>  /  TBili  1.8<H>  /  DBili  x   /  AST  25  /  ALT  30  /  AlkPhos  246<H>  11-11

## 2019-11-11 NOTE — PROGRESS NOTE ADULT - SUBJECTIVE AND OBJECTIVE BOX
SUBJECTIVE / OVERNIGHT EVENTS: pt denies chest pain, shortness of breath       MEDICATIONS  (STANDING):  ceFAZolin   IVPB 1000 milliGRAM(s) IV Intermittent every 12 hours  heparin  Injectable 5000 Unit(s) SubCutaneous every 12 hours  pantoprazole    Tablet 40 milliGRAM(s) Oral before breakfast    MEDICATIONS  (PRN):  acetaminophen   Tablet .. 650 milliGRAM(s) Oral every 6 hours PRN Temp greater or equal to 38C (100.4F), Mild Pain (1 - 3)      Vital Signs Last 24 Hrs  T(C): 37.8 (2019 21:31), Max: 37.8 (2019 21:31)  T(F): 100 (2019 21:), Max: 100 (2019 21:31)  HR: 86 (:) (73 - 92)  BP: 150/84 (2019 21:) (129/63 - 161/82)  BP(mean): --  RR: 18 (:) (18 - 18)  SpO2: 95% (:) (95% - 98%)    Constitutional: No fever, fatigue  Skin: No rash.  Eyes: No recent vision problems or eye pain.  ENT: No congestion, ear pain, or sore throat.  Cardiovascular: No chest pain or palpation.  Respiratory: No cough, shortness of breath, congestion, or wheezing.  Gastrointestinal: No abdominal pain, nausea, vomiting, or diarrhea.  Genitourinary: No dysuria.  Musculoskeletal: No joint swelling.  Neurologic: No headache.    PHYSICAL EXAM:  GENERAL: NAD  EYES: EOMI, PERRLA  NECK: Supple, No JVD  CHEST/LUNG: dec breath sounds rt base  HEART:  S1 , S2 +  ABDOMEN: soft , bs+  EXTREMITIES:  trace edema+  NEUROLOGY:alert awake oriented      LABS:      139  |  104  |  15  ----------------------------<  111<H>  3.4<L>   |  24  |  0.77    Ca    8.4      2019 07:16    TPro  5.6<L>  /  Alb  2.8<L>  /  TBili  1.8<H>  /  DBili      /  AST  25  /  ALT  30  /  AlkPhos  246<H>      Creatinine Trend: 0.77 <--, 0.82 <--, 0.85 <--, 0.99 <--                        11.0   9.71  )-----------( 240      ( 2019 09:25 )             34.5     Urine Studies:  Urinalysis Basic - ( 2019 00:18 )    Color: Yellow / Appearance: Slightly Turbid / S.022 / pH:   Gluc:  / Ketone: Negative  / Bili: Negative / Urobili: Negative   Blood:  / Protein: 100 mg/dL / Nitrite: Positive   Leuk Esterase: Small / RBC: 9 /hpf /  /HPF   Sq Epi:  / Non Sq Epi: 18 / Bacteria: Many              LIVER FUNCTIONS - ( 2019 07:16 )  Alb: 2.8 g/dL / Pro: 5.6 g/dL / ALK PHOS: 246 U/L / ALT: 30 U/L / AST: 25 U/L / GGT: x             Color: Yellow / Appearance: Slightly Turbid / S.022 / pH: x  Gluc: x / Ketone: Negative  / Bili: Negative / Urobili: Negative   Blood: x / Protein: 100 mg/dL / Nitrite: Positive   Leuk Esterase: Small / RBC: 9 /hpf /  /HPF   Sq Epi: x / Non Sq Epi: 18 / Bacteria: Many        RADIOLOGY & ADDITIONAL TESTS:    Imaging Personally Reviewed:    Consultant(s) Notes Reviewed:      Care Discussed with Consultants/Other Providers:

## 2019-11-12 LAB
ALBUMIN SERPL ELPH-MCNC: 2.5 G/DL — LOW (ref 3.3–5)
ALP SERPL-CCNC: 261 U/L — HIGH (ref 40–120)
ALT FLD-CCNC: 26 U/L — SIGNIFICANT CHANGE UP (ref 10–45)
ANION GAP SERPL CALC-SCNC: 10 MMOL/L — SIGNIFICANT CHANGE UP (ref 5–17)
AST SERPL-CCNC: 23 U/L — SIGNIFICANT CHANGE UP (ref 10–40)
BILIRUB SERPL-MCNC: 1.6 MG/DL — HIGH (ref 0.2–1.2)
BUN SERPL-MCNC: 14 MG/DL — SIGNIFICANT CHANGE UP (ref 7–23)
CALCIUM SERPL-MCNC: 8.2 MG/DL — LOW (ref 8.4–10.5)
CHLORIDE SERPL-SCNC: 103 MMOL/L — SIGNIFICANT CHANGE UP (ref 96–108)
CO2 SERPL-SCNC: 24 MMOL/L — SIGNIFICANT CHANGE UP (ref 22–31)
CREAT SERPL-MCNC: 0.72 MG/DL — SIGNIFICANT CHANGE UP (ref 0.5–1.3)
GGT SERPL-CCNC: 147 U/L — HIGH (ref 8–40)
GLUCOSE SERPL-MCNC: 101 MG/DL — HIGH (ref 70–99)
POTASSIUM SERPL-MCNC: 3.5 MMOL/L — SIGNIFICANT CHANGE UP (ref 3.5–5.3)
POTASSIUM SERPL-SCNC: 3.5 MMOL/L — SIGNIFICANT CHANGE UP (ref 3.5–5.3)
PROT SERPL-MCNC: 5.5 G/DL — LOW (ref 6–8.3)
SODIUM SERPL-SCNC: 137 MMOL/L — SIGNIFICANT CHANGE UP (ref 135–145)

## 2019-11-12 PROCEDURE — 73630 X-RAY EXAM OF FOOT: CPT | Mod: 26,50

## 2019-11-12 PROCEDURE — 99232 SBSQ HOSP IP/OBS MODERATE 35: CPT

## 2019-11-12 RX ORDER — URSODIOL 250 MG/1
300 TABLET, FILM COATED ORAL
Refills: 0 | Status: DISCONTINUED | OUTPATIENT
Start: 2019-11-12 | End: 2019-11-18

## 2019-11-12 RX ADMIN — URSODIOL 300 MILLIGRAM(S): 250 TABLET, FILM COATED ORAL at 17:10

## 2019-11-12 RX ADMIN — Medication 650 MILLIGRAM(S): at 13:15

## 2019-11-12 RX ADMIN — Medication 100 MILLIGRAM(S): at 05:43

## 2019-11-12 RX ADMIN — Medication 100 MILLIGRAM(S): at 17:11

## 2019-11-12 RX ADMIN — Medication 650 MILLIGRAM(S): at 12:46

## 2019-11-12 RX ADMIN — HEPARIN SODIUM 5000 UNIT(S): 5000 INJECTION INTRAVENOUS; SUBCUTANEOUS at 05:43

## 2019-11-12 RX ADMIN — HEPARIN SODIUM 5000 UNIT(S): 5000 INJECTION INTRAVENOUS; SUBCUTANEOUS at 17:10

## 2019-11-12 RX ADMIN — PANTOPRAZOLE SODIUM 40 MILLIGRAM(S): 20 TABLET, DELAYED RELEASE ORAL at 05:43

## 2019-11-12 NOTE — PROGRESS NOTE ADULT - SUBJECTIVE AND OBJECTIVE BOX
89y old  Female who presents with a chief complaint of fall (12 Nov 2019 08:55)      Interval history:  Low grade temp overnight, pain little better, asking if she is improving.       Allergies:   sulfa drugs (Hives)      Antimicrobials:  ceFAZolin   IVPB 1000 milliGRAM(s) IV Intermittent every 12 hours      REVIEW OF SYSTEMS:  No chest pain   No SOB  No N/V  No dysuria  No rash.       Vital Signs Last 24 Hrs  T(C): 36.4 (11-12-19 @ 16:38), Max: 37.8 (11-11-19 @ 21:31)  T(F): 97.6 (11-12-19 @ 16:38), Max: 100 (11-11-19 @ 21:31)  HR: 75 (11-12-19 @ 16:38) (62 - 86)  BP: 135/82 (11-12-19 @ 16:38) (109/65 - 152/81)  BP(mean): --  RR: 18 (11-12-19 @ 16:38) (18 - 18)  SpO2: 97% (11-12-19 @ 16:38) (95% - 97%)      PHYSICAL EXAM:  Patient in no acute distress. Alert, awake  Rt sided periorbital ecchymosis.  Cardiovascular: S1S2 normal.  Lungs: + air entry B/L lung fields.  Gastrointestinal: soft, nontender, nondistended.  Extremities: rt lower extremity edema, improved erythema in thigh area, warmth improved a little too.   IV sites not inflamed.                           11.0   9.71  )-----------( 240      ( 11 Nov 2019 09:25 )             34.5   11-12    137  |  103  |  14  ----------------------------<  101<H>  3.5   |  24  |  0.72    Ca    8.2<L>      12 Nov 2019 07:40    TPro  5.5<L>  /  Alb  2.5<L>  /  TBili  1.6<H>  /  DBili  x   /  AST  23  /  ALT  26  /  AlkPhos  261<H>  11-12      LIVER FUNCTIONS - ( 12 Nov 2019 11:04 )  Alb: x     / Pro: x     / ALK PHOS: x     / ALT: x     / AST: x     / GGT: 147 U/L

## 2019-11-12 NOTE — CONSULT NOTE ADULT - SUBJECTIVE AND OBJECTIVE BOX
Podiatry pager #: [place] Pager:  [phone] [facility] Pager: [phone]    Patient is a 89y old  Female who presents with a chief complaint of fall (12 Nov 2019 08:32)      HPI:  89 f h/o GERD p/w mechanical fall and head trauma at home.  In ER, found to be dehydrated, and diagnosed with UTI, Cellulitis.  Poor historian; family not reachable currently.  No dizziness or LOC.  Given Keflex and Clindamycin in ER. (09 Nov 2019 10:21)    Above reviewed. Pt says shes Pod every few months.       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 650 milliGRAM(s) Oral every 6 hours PRN Temp greater or equal to 38C (100.4F), Mild Pain (1 - 3)      Allergies    sulfa drugs (Hives)    Intolerances        VITALS:    Vital Signs Last 24 Hrs  T(C): 37.3 (12 Nov 2019 05:57), Max: 37.8 (11 Nov 2019 21:31)  T(F): 99.2 (12 Nov 2019 05:57), Max: 100 (11 Nov 2019 21:31)  HR: 80 (12 Nov 2019 05:57) (73 - 92)  BP: 152/81 (12 Nov 2019 05:57) (129/63 - 152/81)  BP(mean): --  RR: 18 (12 Nov 2019 05:57) (18 - 18)  SpO2: 96% (12 Nov 2019 05:57) (95% - 98%)    LABS:                          11.0   9.71  )-----------( 240      ( 11 Nov 2019 09:25 )             34.5       11-12    137  |  103  |  14  ----------------------------<  101<H>  3.5   |  24  |  0.72    Ca    8.2<L>      12 Nov 2019 07:40    TPro  5.5<L>  /  Alb  2.5<L>  /  TBili  1.6<H>  /  DBili  x   /  AST  23  /  ALT  26  /  AlkPhos  261<H>  11-12      CAPILLARY BLOOD GLUCOSE              LOWER EXTREMITY PHYSICAL EXAM:    Vascular: DP/PT 0/4, B/L, CFT <3 seconds B/L, Temperature gradient warm to cool, B/L.   Neuro: Epicritic sensation intact  to the level of digits B/L.  Musculoskeletal/Ortho: contracted overlapping LF 2nd dgiti,  HAV L>R  Skin: thickened dystrophic toenails w/ subungual debris X10, RLE erythema stopping at the ankle  Wound #1:   Location: RF sub met 5  Size: 0.2 X 0.3 cm   Depth: superficial   Wound bed: granular  Drainage: None  Odor: None  Periwound: hyperkeratotic   Etiology: Pressure    RADIOLOGY & ADDITIONAL STUDIES:

## 2019-11-12 NOTE — PROGRESS NOTE ADULT - SUBJECTIVE AND OBJECTIVE BOX
INTERVAL HPI/OVERNIGHT EVENTS:    MEDICATIONS  (STANDING):  ceFAZolin   IVPB 1000 milliGRAM(s) IV Intermittent every 12 hours  heparin  Injectable 5000 Unit(s) SubCutaneous every 12 hours  pantoprazole    Tablet 40 milliGRAM(s) Oral before breakfast    MEDICATIONS  (PRN):  acetaminophen   Tablet .. 650 milliGRAM(s) Oral every 6 hours PRN Temp greater or equal to 38C (100.4F), Mild Pain (1 - 3)      Allergies    sulfa drugs (Hives)    Intolerances            PHYSICAL EXAM:   Vital Signs:  Vital Signs Last 24 Hrs  T(C): 37.3 (12 Nov 2019 05:57), Max: 37.8 (11 Nov 2019 21:31)  T(F): 99.2 (12 Nov 2019 05:57), Max: 100 (11 Nov 2019 21:31)  HR: 80 (12 Nov 2019 05:57) (73 - 92)  BP: 152/81 (12 Nov 2019 05:57) (129/63 - 152/81)  BP(mean): --  RR: 18 (12 Nov 2019 05:57) (18 - 18)  SpO2: 96% (12 Nov 2019 05:57) (95% - 98%)  Daily     Daily     GENERAL:  no distress  HEENT:  NC/AT,  anicteric  CHEST:   no increased effort, breath sounds clear  HEART:  Regular rhythm  ABDOMEN:  Soft, non-tender, non-distended, normoactive bowel sounds,  no masses ,no hepato-splenomegaly, no signs of chronic liver disease  EXTEREMITIES:  no cyanosis      LABS:                        11.0   9.71  )-----------( 240      ( 11 Nov 2019 09:25 )             34.5     11-12    137  |  103  |  14  ----------------------------<  101<H>  3.5   |  24  |  0.72    Ca    8.2<L>      12 Nov 2019 07:40    TPro  5.5<L>  /  Alb  2.5<L>  /  TBili  1.6<H>  /  DBili  x   /  AST  23  /  ALT  26  /  AlkPhos  261<H>  11-12          RADIOLOGY & ADDITIONAL TESTS:

## 2019-11-12 NOTE — PROGRESS NOTE ADULT - SUBJECTIVE AND OBJECTIVE BOX
Podiatry pager #: [place] Pager:  [phone] [facility] Pager: [phone]    Patient is a 89y old  Female who presents with a chief complaint of fall (12 Nov 2019 08:32)      HPI:  89 f h/o GERD p/w mechanical fall and head trauma at home.  In ER, found to be dehydrated, and diagnosed with UTI, Cellulitis.  Poor historian; family not reachable currently.  No dizziness or LOC.  Given Keflex and Clindamycin in ER. (09 Nov 2019 10:21)    Above reviewed. Pt says shes Pod every few months.       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 650 milliGRAM(s) Oral every 6 hours PRN Temp greater or equal to 38C (100.4F), Mild Pain (1 - 3)      Allergies    sulfa drugs (Hives)    Intolerances        VITALS:    Vital Signs Last 24 Hrs  T(C): 37.3 (12 Nov 2019 05:57), Max: 37.8 (11 Nov 2019 21:31)  T(F): 99.2 (12 Nov 2019 05:57), Max: 100 (11 Nov 2019 21:31)  HR: 80 (12 Nov 2019 05:57) (73 - 92)  BP: 152/81 (12 Nov 2019 05:57) (129/63 - 152/81)  BP(mean): --  RR: 18 (12 Nov 2019 05:57) (18 - 18)  SpO2: 96% (12 Nov 2019 05:57) (95% - 98%)    LABS:                          11.0   9.71  )-----------( 240      ( 11 Nov 2019 09:25 )             34.5       11-12    137  |  103  |  14  ----------------------------<  101<H>  3.5   |  24  |  0.72    Ca    8.2<L>      12 Nov 2019 07:40    TPro  5.5<L>  /  Alb  2.5<L>  /  TBili  1.6<H>  /  DBili  x   /  AST  23  /  ALT  26  /  AlkPhos  261<H>  11-12      CAPILLARY BLOOD GLUCOSE              LOWER EXTREMITY PHYSICAL EXAM:    Vascular: DP/PT 0/4, B/L, CFT <3 seconds B/L, Temperature gradient warm to cool, B/L.   Neuro: Epicritic sensation intact  to the level of digits B/L.  Musculoskeletal/Ortho: contracted overlapping LF 2nd dgiti,  HAV L>R  Skin: thickened dystrophic toenails w/ subungual debris X10, RLE erythema stopping at the ankle  Wound #1:   Location: RF sub met 5  Size: 0.2 X 0.3 cm   Depth: superficial   Wound bed: granular  Drainage: None  Odor: None  Periwound: hyperkeratotic   Etiology: Pressure    RADIOLOGY & ADDITIONAL STUDIES:

## 2019-11-12 NOTE — PROGRESS NOTE ADULT - SUBJECTIVE AND OBJECTIVE BOX
SUBJECTIVE / OVERNIGHT EVENTS: pt denies chest pain, shortness of breath     MEDICATIONS  (STANDING):  ceFAZolin   IVPB 1000 milliGRAM(s) IV Intermittent every 12 hours  heparin  Injectable 5000 Unit(s) SubCutaneous every 12 hours  pantoprazole    Tablet 40 milliGRAM(s) Oral before breakfast  ursodiol Capsule 300 milliGRAM(s) Oral two times a day    MEDICATIONS  (PRN):  acetaminophen   Tablet .. 650 milliGRAM(s) Oral every 6 hours PRN Temp greater or equal to 38C (100.4F), Mild Pain (1 - 3)    Vital Signs Last 24 Hrs  T(C): 36.7 (2019 22:17), Max: 37.3 (2019 05:57)  T(F): 98.1 (2019 22:17), Max: 99.2 (2019 05:57)  HR: 80 (2019 22:17) (62 - 84)  BP: 152/94 (2019 22:17) (109/65 - 152/94)  BP(mean): --  RR: 18 (2019 22:17) (18 - 18)  SpO2: 100% (2019 22:17) (95% - 100%)    Constitutional: No fever, fatigue  Skin: No rash.  Eyes: No recent vision problems or eye pain.  ENT: No congestion, ear pain, or sore throat.  Cardiovascular: No chest pain or palpation.  Respiratory: No cough, shortness of breath, congestion, or wheezing.  Gastrointestinal: No abdominal pain, nausea, vomiting, or diarrhea.  Genitourinary: No dysuria.  Musculoskeletal: No joint swelling.  Neurologic: No headache.    PHYSICAL EXAM:  GENERAL: NAD  EYES: EOMI, PERRLA  NECK: Supple, No JVD  CHEST/LUNG: dec breath sounds rt base  HEART:  S1 , S2 +  ABDOMEN: soft , bs+  EXTREMITIES:  trace edema+  NEUROLOGY:alert awake oriented      LABS:      137  |  103  |  14  ----------------------------<  101<H>  3.5   |  24  |  0.72    Ca    8.2<L>      2019 07:40    TPro  5.5<L>  /  Alb  2.5<L>  /  TBili  1.6<H>  /  DBili      /  AST  23  /  ALT  26  /  AlkPhos  261<H>  11-12    Creatinine Trend: 0.72 <--, 0.77 <--, 0.82 <--, 0.85 <--, 0.99 <--                        11.0   9.71  )-----------( 240      ( 2019 09:25 )             34.5     Urine Studies:  Urinalysis Basic - ( 2019 00:18 )    Color: Yellow / Appearance: Slightly Turbid / S.022 / pH:   Gluc:  / Ketone: Negative  / Bili: Negative / Urobili: Negative   Blood:  / Protein: 100 mg/dL / Nitrite: Positive   Leuk Esterase: Small / RBC: 9 /hpf /  /HPF   Sq Epi:  / Non Sq Epi: 18 / Bacteria: Many              LIVER FUNCTIONS - ( 2019 11:04 )  Alb: x     / Pro: x     / ALK PHOS: x     / ALT: x     / AST: x     / GGT: 147 U/L         )    Color: Yellow / Appearance: Slightly Turbid / S.022 / pH:   Gluc:  / Ketone: Negative  / Bili: Negative / Urobili: Negative   Blood:  / Protein: 100 mg/dL / Nitrite: Positive   Leuk Esterase: Small / RBC: 9 /hpf /  /HPF   Sq Epi:  / Non Sq Epi: 18 / Bacteria: Many              LIVER FUNCTIONS - ( 2019 07:16 )  Alb: 2.8 g/dL / Pro: 5.6 g/dL / ALK PHOS: 246 U/L / ALT: 30 U/L / AST: 25 U/L / GGT: x             Color: Yellow / Appearance: Slightly Turbid / S.022 / pH: x  Gluc: x / Ketone: Negative  / Bili: Negative / Urobili: Negative   Blood: x / Protein: 100 mg/dL / Nitrite: Positive   Leuk Esterase: Small / RBC: 9 /hpf /  /HPF   Sq Epi: x / Non Sq Epi: 18 / Bacteria: Many        RADIOLOGY & ADDITIONAL TESTS:    Imaging Personally Reviewed:    Consultant(s) Notes Reviewed:      Care Discussed with Consultants/Other Providers:

## 2019-11-13 LAB
ALBUMIN SERPL ELPH-MCNC: 2.8 G/DL — LOW (ref 3.3–5)
ALP SERPL-CCNC: 306 U/L — HIGH (ref 40–120)
ALT FLD-CCNC: 22 U/L — SIGNIFICANT CHANGE UP (ref 10–45)
ANION GAP SERPL CALC-SCNC: 15 MMOL/L — SIGNIFICANT CHANGE UP (ref 5–17)
AST SERPL-CCNC: 23 U/L — SIGNIFICANT CHANGE UP (ref 10–40)
BILIRUB SERPL-MCNC: 1.9 MG/DL — HIGH (ref 0.2–1.2)
BUN SERPL-MCNC: 14 MG/DL — SIGNIFICANT CHANGE UP (ref 7–23)
CALCIUM SERPL-MCNC: 8.6 MG/DL — SIGNIFICANT CHANGE UP (ref 8.4–10.5)
CHLORIDE SERPL-SCNC: 99 MMOL/L — SIGNIFICANT CHANGE UP (ref 96–108)
CO2 SERPL-SCNC: 23 MMOL/L — SIGNIFICANT CHANGE UP (ref 22–31)
CREAT SERPL-MCNC: 0.71 MG/DL — SIGNIFICANT CHANGE UP (ref 0.5–1.3)
GLUCOSE SERPL-MCNC: 97 MG/DL — SIGNIFICANT CHANGE UP (ref 70–99)
HCT VFR BLD CALC: 37.2 % — SIGNIFICANT CHANGE UP (ref 34.5–45)
HGB BLD-MCNC: 11.7 G/DL — SIGNIFICANT CHANGE UP (ref 11.5–15.5)
MCHC RBC-ENTMCNC: 31.5 GM/DL — LOW (ref 32–36)
MCHC RBC-ENTMCNC: 32.1 PG — SIGNIFICANT CHANGE UP (ref 27–34)
MCV RBC AUTO: 102.2 FL — HIGH (ref 80–100)
PLATELET # BLD AUTO: 349 K/UL — SIGNIFICANT CHANGE UP (ref 150–400)
POTASSIUM SERPL-MCNC: 3.7 MMOL/L — SIGNIFICANT CHANGE UP (ref 3.5–5.3)
POTASSIUM SERPL-SCNC: 3.7 MMOL/L — SIGNIFICANT CHANGE UP (ref 3.5–5.3)
PROT SERPL-MCNC: 6.2 G/DL — SIGNIFICANT CHANGE UP (ref 6–8.3)
RBC # BLD: 3.64 M/UL — LOW (ref 3.8–5.2)
RBC # FLD: 14.5 % — SIGNIFICANT CHANGE UP (ref 10.3–14.5)
SODIUM SERPL-SCNC: 137 MMOL/L — SIGNIFICANT CHANGE UP (ref 135–145)
WBC # BLD: 9.04 K/UL — SIGNIFICANT CHANGE UP (ref 3.8–10.5)
WBC # FLD AUTO: 9.04 K/UL — SIGNIFICANT CHANGE UP (ref 3.8–10.5)

## 2019-11-13 PROCEDURE — 99232 SBSQ HOSP IP/OBS MODERATE 35: CPT

## 2019-11-13 RX ORDER — INFLUENZA VIRUS VACCINE 15; 15; 15; 15 UG/.5ML; UG/.5ML; UG/.5ML; UG/.5ML
0.5 SUSPENSION INTRAMUSCULAR ONCE
Refills: 0 | Status: COMPLETED | OUTPATIENT
Start: 2019-11-13 | End: 2019-11-13

## 2019-11-13 RX ADMIN — URSODIOL 300 MILLIGRAM(S): 250 TABLET, FILM COATED ORAL at 05:25

## 2019-11-13 RX ADMIN — URSODIOL 300 MILLIGRAM(S): 250 TABLET, FILM COATED ORAL at 18:08

## 2019-11-13 RX ADMIN — HEPARIN SODIUM 5000 UNIT(S): 5000 INJECTION INTRAVENOUS; SUBCUTANEOUS at 05:25

## 2019-11-13 RX ADMIN — Medication 100 MILLIGRAM(S): at 18:08

## 2019-11-13 RX ADMIN — Medication 100 MILLIGRAM(S): at 05:25

## 2019-11-13 RX ADMIN — HEPARIN SODIUM 5000 UNIT(S): 5000 INJECTION INTRAVENOUS; SUBCUTANEOUS at 18:08

## 2019-11-13 RX ADMIN — PANTOPRAZOLE SODIUM 40 MILLIGRAM(S): 20 TABLET, DELAYED RELEASE ORAL at 05:25

## 2019-11-13 NOTE — PATIENT PROFILE ADULT - DOES PATIENT HAVE ADVANCE DIRECTIVE
Pt. Confused at baseline unable to get an accurate history without family at the bedside. No advanced directives in chart

## 2019-11-13 NOTE — PROGRESS NOTE ADULT - SUBJECTIVE AND OBJECTIVE BOX
SUBJECTIVE / OVERNIGHT EVENTS: pt denies chest pain, shortness of breath     MEDICATIONS  (STANDING):  ceFAZolin   IVPB 1000 milliGRAM(s) IV Intermittent every 12 hours  heparin  Injectable 5000 Unit(s) SubCutaneous every 12 hours  influenza   Vaccine 0.5 milliLiter(s) IntraMuscular once  pantoprazole    Tablet 40 milliGRAM(s) Oral before breakfast  ursodiol Capsule 300 milliGRAM(s) Oral two times a day    MEDICATIONS  (PRN):  acetaminophen   Tablet .. 650 milliGRAM(s) Oral every 6 hours PRN Temp greater or equal to 38C (100.4F), Mild Pain (1 - 3)    Vital Signs Last 24 Hrs  T(C): 37.3 (2019 10:44), Max: 37.3 (2019 10:44)  T(F): 99.1 (2019 10:44), Max: 99.1 (2019 10:44)  HR: 79 (2019 10:44) (62 - 84)  BP: 132/66 (2019 10:44) (109/65 - 164/82)  BP(mean): --  RR: 17 (2019 10:44) (17 - 18)  SpO2: 95% (2019 10:44) (95% - 100%)    Constitutional: No fever, fatigue  Skin: No rash.  Eyes: No recent vision problems or eye pain.  ENT: No congestion, ear pain, or sore throat.  Cardiovascular: No chest pain or palpation.  Respiratory: No cough, shortness of breath, congestion, or wheezing.  Gastrointestinal: No abdominal pain, nausea, vomiting, or diarrhea.  Genitourinary: No dysuria.  Musculoskeletal: No joint swelling.  Neurologic: No headache.    PHYSICAL EXAM:  GENERAL: NAD  EYES: EOMI, PERRLA  NECK: Supple, No JVD  CHEST/LUNG: dec breath sounds rt base  HEART:  S1 , S2 +  ABDOMEN: soft , bs+  EXTREMITIES:  trace edema+  NEUROLOGY:alert awake oriented    LABS:      137  |  99  |  14  ----------------------------<  97  3.7   |  23  |  0.71    Ca    8.6      2019 07:13    TPro  6.2  /  Alb  2.8<L>  /  TBili  1.9<H>  /  DBili      /  AST  23  /  ALT  22  /  AlkPhos  306<H>  11-13    Creatinine Trend: 0.71 <--, 0.72 <--, 0.77 <--, 0.82 <--, 0.85 <--, 0.99 <--                        11.7   9.04  )-----------( 349      ( 2019 10:16 )             37.2     Urine Studies:  Urinalysis Basic - ( 2019 00:18 )    Color: Yellow / Appearance: Slightly Turbid / S.022 / pH:   Gluc:  / Ketone: Negative  / Bili: Negative / Urobili: Negative   Blood:  / Protein: 100 mg/dL / Nitrite: Positive   Leuk Esterase: Small / RBC: 9 /hpf /  /HPF   Sq Epi:  / Non Sq Epi: 18 / Bacteria: Many              LIVER FUNCTIONS - ( 2019 07:13 )  Alb: 2.8 g/dL / Pro: 6.2 g/dL / ALK PHOS: 306 U/L / ALT: 22 U/L / AST: 23 U/L / GGT: x

## 2019-11-13 NOTE — PROGRESS NOTE ADULT - SUBJECTIVE AND OBJECTIVE BOX
89y old  Female who presents with a chief complaint of fall (13 Nov 2019 12:59)      Interval history:  Afebrile, improved pain, less redness.     Allergies:   sulfa drugs (Hives)      Antimicrobials:  ceFAZolin   IVPB 1000 milliGRAM(s) IV Intermittent every 12 hours      REVIEW OF SYSTEMS:  Unreliable historian.       Vital Signs Last 24 Hrs  T(C): 37.1 (11-13-19 @ 13:03), Max: 37.3 (11-13-19 @ 10:44)  T(F): 98.8 (11-13-19 @ 13:03), Max: 99.1 (11-13-19 @ 10:44)  HR: 76 (11-13-19 @ 13:03) (62 - 84)  BP: 131/83 (11-13-19 @ 13:03) (109/65 - 164/82)  BP(mean): --  RR: 17 (11-13-19 @ 13:03) (17 - 18)  SpO2: 96% (11-13-19 @ 13:03) (95% - 100%)      PHYSICAL EXAM:  Patient in no acute distress. Alert, awake.   Rt sided periorbital ecchymosis.  Cardiovascular: S1S2 normal.  Lungs: + air entry B/L lung fields.  Gastrointestinal: soft, nontender, nondistended.  Extremities: rt lower extremity edema, improved erythema in thigh area, warmth improved.   IV sites not inflamed.                             11.7   9.04  )-----------( 349      ( 13 Nov 2019 10:16 )             37.2   11-13    137  |  99  |  14  ----------------------------<  97  3.7   |  23  |  0.71    Ca    8.6      13 Nov 2019 07:13    TPro  6.2  /  Alb  2.8<L>  /  TBili  1.9<H>  /  DBili  x   /  AST  23  /  ALT  22  /  AlkPhos  306<H>  11-13      LIVER FUNCTIONS - ( 13 Nov 2019 07:13 )  Alb: 2.8 g/dL / Pro: 6.2 g/dL / ALK PHOS: 306 U/L / ALT: 22 U/L / AST: 23 U/L / GGT: x               Radiology:  < from: Xray Foot AP + Lateral + Oblique, Bilat (11.12.19 @ 22:09) >  IMPRESSION:    Left foot:  1. Severe hallux valgus.  2. Dorsal deviation likely subluxation of the second and third toes,   possibly the fourth toe as well.    Right foot:  1. Moderate hallux valgus with early degeneration of the first MTP joint.  2. Hammertoe deformities in the second through fourth toes.

## 2019-11-13 NOTE — PROGRESS NOTE ADULT - SUBJECTIVE AND OBJECTIVE BOX
INTERVAL HPI/OVERNIGHT EVENTS:    MEDICATIONS  (STANDING):  ceFAZolin   IVPB 1000 milliGRAM(s) IV Intermittent every 12 hours  heparin  Injectable 5000 Unit(s) SubCutaneous every 12 hours  influenza   Vaccine 0.5 milliLiter(s) IntraMuscular once  pantoprazole    Tablet 40 milliGRAM(s) Oral before breakfast  ursodiol Capsule 300 milliGRAM(s) Oral two times a day    MEDICATIONS  (PRN):  acetaminophen   Tablet .. 650 milliGRAM(s) Oral every 6 hours PRN Temp greater or equal to 38C (100.4F), Mild Pain (1 - 3)      Allergies    sulfa drugs (Hives)    Intolerances            PHYSICAL EXAM:   Vital Signs:  Vital Signs Last 24 Hrs  T(C): 37.1 (13 Nov 2019 05:06), Max: 37.2 (12 Nov 2019 13:01)  T(F): 98.8 (13 Nov 2019 05:06), Max: 98.9 (12 Nov 2019 13:01)  HR: 84 (13 Nov 2019 05:06) (62 - 84)  BP: 164/82 (13 Nov 2019 05:06) (109/65 - 164/82)  BP(mean): --  RR: 18 (13 Nov 2019 05:06) (18 - 18)  SpO2: 96% (13 Nov 2019 05:06) (95% - 100%)  Daily     Daily     GENERAL:  no distress  HEENT:  NC/AT,  anicteric  CHEST:   no increased effort, breath sounds clear  HEART:  Regular rhythm  ABDOMEN:  Soft, non-tender, non-distended, normoactive bowel sounds,  no masses ,no hepato-splenomegaly, no signs of chronic liver disease  EXTEREMITIES:  no cyanosis      LABS:                        11.0   9.71  )-----------( 240      ( 11 Nov 2019 09:25 )             34.5     11-13    137  |  99  |  14  ----------------------------<  97  3.7   |  23  |  0.71    Ca    8.6      13 Nov 2019 07:13    TPro  6.2  /  Alb  2.8<L>  /  TBili  1.9<H>  /  DBili  x   /  AST  23  /  ALT  22  /  AlkPhos  306<H>  11-13          RADIOLOGY & ADDITIONAL TESTS:

## 2019-11-14 LAB
ALBUMIN SERPL ELPH-MCNC: 2.3 G/DL — LOW (ref 3.3–5)
ALP SERPL-CCNC: 262 U/L — HIGH (ref 40–120)
ALT FLD-CCNC: 13 U/L — SIGNIFICANT CHANGE UP (ref 10–45)
ANION GAP SERPL CALC-SCNC: 14 MMOL/L — SIGNIFICANT CHANGE UP (ref 5–17)
AST SERPL-CCNC: 16 U/L — SIGNIFICANT CHANGE UP (ref 10–40)
BILIRUB SERPL-MCNC: 1.3 MG/DL — HIGH (ref 0.2–1.2)
BUN SERPL-MCNC: 15 MG/DL — SIGNIFICANT CHANGE UP (ref 7–23)
CALCIUM SERPL-MCNC: 8.4 MG/DL — SIGNIFICANT CHANGE UP (ref 8.4–10.5)
CHLORIDE SERPL-SCNC: 101 MMOL/L — SIGNIFICANT CHANGE UP (ref 96–108)
CO2 SERPL-SCNC: 23 MMOL/L — SIGNIFICANT CHANGE UP (ref 22–31)
CREAT SERPL-MCNC: 0.73 MG/DL — SIGNIFICANT CHANGE UP (ref 0.5–1.3)
GLUCOSE SERPL-MCNC: 83 MG/DL — SIGNIFICANT CHANGE UP (ref 70–99)
HCT VFR BLD CALC: 33.2 % — LOW (ref 34.5–45)
HGB BLD-MCNC: 10.4 G/DL — LOW (ref 11.5–15.5)
MCHC RBC-ENTMCNC: 31.3 GM/DL — LOW (ref 32–36)
MCHC RBC-ENTMCNC: 32.3 PG — SIGNIFICANT CHANGE UP (ref 27–34)
MCV RBC AUTO: 103.1 FL — HIGH (ref 80–100)
PLATELET # BLD AUTO: 359 K/UL — SIGNIFICANT CHANGE UP (ref 150–400)
POTASSIUM SERPL-MCNC: 3.7 MMOL/L — SIGNIFICANT CHANGE UP (ref 3.5–5.3)
POTASSIUM SERPL-SCNC: 3.7 MMOL/L — SIGNIFICANT CHANGE UP (ref 3.5–5.3)
PROT SERPL-MCNC: 5.5 G/DL — LOW (ref 6–8.3)
RBC # BLD: 3.22 M/UL — LOW (ref 3.8–5.2)
RBC # FLD: 14.6 % — HIGH (ref 10.3–14.5)
SODIUM SERPL-SCNC: 138 MMOL/L — SIGNIFICANT CHANGE UP (ref 135–145)
WBC # BLD: 9.64 K/UL — SIGNIFICANT CHANGE UP (ref 3.8–10.5)
WBC # FLD AUTO: 9.64 K/UL — SIGNIFICANT CHANGE UP (ref 3.8–10.5)

## 2019-11-14 PROCEDURE — 99232 SBSQ HOSP IP/OBS MODERATE 35: CPT

## 2019-11-14 RX ADMIN — URSODIOL 300 MILLIGRAM(S): 250 TABLET, FILM COATED ORAL at 17:10

## 2019-11-14 RX ADMIN — HEPARIN SODIUM 5000 UNIT(S): 5000 INJECTION INTRAVENOUS; SUBCUTANEOUS at 17:10

## 2019-11-14 RX ADMIN — URSODIOL 300 MILLIGRAM(S): 250 TABLET, FILM COATED ORAL at 05:18

## 2019-11-14 RX ADMIN — Medication 100 MILLIGRAM(S): at 05:18

## 2019-11-14 RX ADMIN — PANTOPRAZOLE SODIUM 40 MILLIGRAM(S): 20 TABLET, DELAYED RELEASE ORAL at 05:18

## 2019-11-14 RX ADMIN — HEPARIN SODIUM 5000 UNIT(S): 5000 INJECTION INTRAVENOUS; SUBCUTANEOUS at 05:18

## 2019-11-14 RX ADMIN — Medication 100 MILLIGRAM(S): at 17:10

## 2019-11-14 NOTE — PROGRESS NOTE ADULT - SUBJECTIVE AND OBJECTIVE BOX
89y old  Female who presents with a chief complaint of fall (14 Nov 2019 08:33)      Interval history:  Afebrile, anxious that she has so many errands to run, denies pain in the leg.       Allergies:  sulfa drugs (Hives)      Antimicrobials:  ceFAZolin   IVPB 1000 milliGRAM(s) IV Intermittent every 12 hours      REVIEW OF SYSTEMS:  No chest pain   No SOB  No N/V  No rash.     Vital Signs Last 24 Hrs  T(C): 36.9 (11-14-19 @ 15:19), Max: 37.2 (11-13-19 @ 17:41)  T(F): 98.5 (11-14-19 @ 15:19), Max: 99 (11-13-19 @ 17:41)  HR: 92 (11-14-19 @ 15:19) (72 - 92)  BP: 149/81 (11-14-19 @ 15:19) (128/66 - 153/823)  BP(mean): --  RR: 18 (11-14-19 @ 15:19) (18 - 18)  SpO2: 97% (11-14-19 @ 15:19) (96% - 98%)      PHYSICAL EXAM:  Patient in no acute distress. Alert, awake, sitting in recliner, confused.   Rt sided periorbital ecchymosis.  Cardiovascular: S1S2 normal.  Lungs: + air entry B/L lung fields.  Gastrointestinal: soft, nontender, nondistended.  Extremities: rt lower extremity edema and erythema, warmth improved.   IV sites not inflamed.                             10.4   9.64  )-----------( 359      ( 14 Nov 2019 09:25 )             33.2   11-14    138  |  101  |  15  ----------------------------<  83  3.7   |  23  |  0.73    Ca    8.4      14 Nov 2019 07:20    TPro  5.5<L>  /  Alb  2.3<L>  /  TBili  1.3<H>  /  DBili  x   /  AST  16  /  ALT  13  /  AlkPhos  262<H>  11-14      LIVER FUNCTIONS - ( 14 Nov 2019 07:20 )  Alb: 2.3 g/dL / Pro: 5.5 g/dL / ALK PHOS: 262 U/L / ALT: 13 U/L / AST: 16 U/L / GGT: x

## 2019-11-14 NOTE — PROGRESS NOTE ADULT - SUBJECTIVE AND OBJECTIVE BOX
PARAG SINCLAIR:[id number],   89yFemale followed for:  sulfa drugs (Hives)    PAST MEDICAL & SURGICAL HISTORY:  Prolapse of Vaginal Wall  Urinary Frequency  Glaucoma  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  History of Spinal Stenosis  H/O: Osteoarthritis  Hernia, Hiatal  Asthma  MVP (Mitral Valve Prolapse)  Functional Heart Murmur  HLD (Hyperlipidemia)  H/O: Hypertension  S/P Colonoscopy  Bilateral Cataracts removed 2010  S/P Hip Replacement  2002    FAMILY HISTORY:    MEDICATIONS  (STANDING):  ceFAZolin   IVPB 1000 milliGRAM(s) IV Intermittent every 12 hours  heparin  Injectable 5000 Unit(s) SubCutaneous every 12 hours  influenza   Vaccine 0.5 milliLiter(s) IntraMuscular once  pantoprazole    Tablet 40 milliGRAM(s) Oral before breakfast  ursodiol Capsule 300 milliGRAM(s) Oral two times a day    MEDICATIONS  (PRN):  acetaminophen   Tablet .. 650 milliGRAM(s) Oral every 6 hours PRN Temp greater or equal to 38C (100.4F), Mild Pain (1 - 3)      Vital Signs Last 24 Hrs  T(C): 36.6 (14 Nov 2019 05:02), Max: 37.3 (13 Nov 2019 10:44)  T(F): 97.9 (14 Nov 2019 05:02), Max: 99.1 (13 Nov 2019 10:44)  HR: 80 (14 Nov 2019 05:02) (72 - 83)  BP: 133/74 (14 Nov 2019 05:02) (128/66 - 153/823)  BP(mean): --  RR: 18 (14 Nov 2019 05:02) (17 - 18)  SpO2: 96% (14 Nov 2019 05:02) (95% - 98%)  nc/at  s1s2  cta  soft, nt, nd no guarding or rebound  no c/c/e    CBC Full  -  ( 13 Nov 2019 10:16 )  WBC Count : 9.04 K/uL  RBC Count : 3.64 M/uL  Hemoglobin : 11.7 g/dL  Hematocrit : 37.2 %  Platelet Count - Automated : 349 K/uL  Mean Cell Volume : 102.2 fl  Mean Cell Hemoglobin : 32.1 pg  Mean Cell Hemoglobin Concentration : 31.5 gm/dL  Auto Neutrophil # : x  Auto Lymphocyte # : x  Auto Monocyte # : x  Auto Eosinophil # : x  Auto Basophil # : x  Auto Neutrophil % : x  Auto Lymphocyte % : x  Auto Monocyte % : x  Auto Eosinophil % : x  Auto Basophil % : x    11-14    138  |  101  |  15  ----------------------------<  83  3.7   |  23  |  0.73    Ca    8.4      14 Nov 2019 07:20    TPro  5.5<L>  /  Alb  2.3<L>  /  TBili  1.3<H>  /  DBili  x   /  AST  16  /  ALT  13  /  AlkPhos  262<H>  11-14

## 2019-11-14 NOTE — PROGRESS NOTE ADULT - SUBJECTIVE AND OBJECTIVE BOX
SUBJECTIVE / OVERNIGHT EVENTS: pt denies chest pain, shortness of breath     MEDICATIONS  (STANDING):  ceFAZolin   IVPB 1000 milliGRAM(s) IV Intermittent every 12 hours  heparin  Injectable 5000 Unit(s) SubCutaneous every 12 hours  influenza   Vaccine 0.5 milliLiter(s) IntraMuscular once  pantoprazole    Tablet 40 milliGRAM(s) Oral before breakfast  ursodiol Capsule 300 milliGRAM(s) Oral two times a day    MEDICATIONS  (PRN):  acetaminophen   Tablet .. 650 milliGRAM(s) Oral every 6 hours PRN Temp greater or equal to 38C (100.4F), Mild Pain (1 - 3)    Vital Signs Last 24 Hrs  T(C): 37.3 (2019 21:34), Max: 37.5 (2019 16:52)  T(F): 99.2 (2019 21:34), Max: 99.5 (2019 16:52)  HR: 81 (2019 21:34) (74 - 92)  BP: 122/70 (2019 21:34) (122/70 - 149/81)  BP(mean): --  RR: 18 (2019 21:34) (18 - 18)  SpO2: 96% (2019 21:34) (96% - 97%)    Constitutional: No fever, fatigue  Skin: No rash.  Eyes: No recent vision problems or eye pain.  ENT: No congestion, ear pain, or sore throat.  Cardiovascular: No chest pain or palpation.  Respiratory: No cough, shortness of breath, congestion, or wheezing.  Gastrointestinal: No abdominal pain, nausea, vomiting, or diarrhea.  Genitourinary: No dysuria.  Musculoskeletal: No joint swelling.  Neurologic: No headache.    PHYSICAL EXAM:  GENERAL: NAD  EYES: EOMI, PERRLA  NECK: Supple, No JVD  CHEST/LUNG: dec breath sounds rt base  HEART:  S1 , S2 +  ABDOMEN: soft , bs+  EXTREMITIES:  trace edema+  NEUROLOGY:alert awake oriented    LABS:      138  |  101  |  15  ----------------------------<  83  3.7   |  23  |  0.73    Ca    8.4      2019 07:20    TPro  5.5<L>  /  Alb  2.3<L>  /  TBili  1.3<H>  /  DBili      /  AST  16  /  ALT  13  /  AlkPhos  262<H>  11-14    Creatinine Trend: 0.73 <--, 0.71 <--, 0.72 <--, 0.77 <--, 0.82 <--, 0.85 <--, 0.99 <--                        10.4   9.64  )-----------( 359      ( 2019 09:25 )             33.2     Urine Studies:  Urinalysis Basic - ( 2019 00:18 )    Color: Yellow / Appearance: Slightly Turbid / S.022 / pH:   Gluc:  / Ketone: Negative  / Bili: Negative / Urobili: Negative   Blood:  / Protein: 100 mg/dL / Nitrite: Positive   Leuk Esterase: Small / RBC: 9 /hpf /  /HPF   Sq Epi:  / Non Sq Epi: 18 / Bacteria: Many              LIVER FUNCTIONS - ( 2019 07:20 )  Alb: 2.3 g/dL / Pro: 5.5 g/dL / ALK PHOS: 262 U/L / ALT: 13 U/L / AST: 16 U/L / GGT: x             Leuk Esterase: Small / RBC: 9 /hpf /  /HPF   Sq Epi:  / Non Sq Epi: 18 / Bacteria: Many              LIVER FUNCTIONS - ( 2019 07:13 )  Alb: 2.8 g/dL / Pro: 6.2 g/dL / ALK PHOS: 306 U/L / ALT: 22 U/L / AST: 23 U/L / GGT: x

## 2019-11-15 PROCEDURE — 74177 CT ABD & PELVIS W/CONTRAST: CPT | Mod: 26

## 2019-11-15 PROCEDURE — 99232 SBSQ HOSP IP/OBS MODERATE 35: CPT

## 2019-11-15 RX ORDER — CEFEPIME 1 G/1
1000 INJECTION, POWDER, FOR SOLUTION INTRAMUSCULAR; INTRAVENOUS EVERY 12 HOURS
Refills: 0 | Status: DISCONTINUED | OUTPATIENT
Start: 2019-11-15 | End: 2019-11-18

## 2019-11-15 RX ADMIN — URSODIOL 300 MILLIGRAM(S): 250 TABLET, FILM COATED ORAL at 17:32

## 2019-11-15 RX ADMIN — PANTOPRAZOLE SODIUM 40 MILLIGRAM(S): 20 TABLET, DELAYED RELEASE ORAL at 05:14

## 2019-11-15 RX ADMIN — HEPARIN SODIUM 5000 UNIT(S): 5000 INJECTION INTRAVENOUS; SUBCUTANEOUS at 17:32

## 2019-11-15 RX ADMIN — URSODIOL 300 MILLIGRAM(S): 250 TABLET, FILM COATED ORAL at 05:14

## 2019-11-15 RX ADMIN — CEFEPIME 100 MILLIGRAM(S): 1 INJECTION, POWDER, FOR SOLUTION INTRAMUSCULAR; INTRAVENOUS at 17:31

## 2019-11-15 RX ADMIN — Medication 100 MILLIGRAM(S): at 05:14

## 2019-11-15 RX ADMIN — HEPARIN SODIUM 5000 UNIT(S): 5000 INJECTION INTRAVENOUS; SUBCUTANEOUS at 05:14

## 2019-11-15 NOTE — DIETITIAN INITIAL EVALUATION ADULT. - FACTORS AFF FOOD INTAKE
pt reports she is eating well; as per RN, pt has been having at least 50% of her meals in house; no chewing/swallowing reported at this time per pt and RN, noted last BM 11/14

## 2019-11-15 NOTE — DIETITIAN INITIAL EVALUATION ADULT. - OTHER INFO
Pt unable to recall what she eats at home but reports she likes cheese. Noted pt c many snacks at bedside, including marshmallows, which pt reports she has been snacking on. Pt reports she lives with her mother-slightly confused, able to state date of birth though and knows her birthday is coming up. Pt reports she does take Ensure shakes at times when she does not want to eat dinner.     Pt reports NKFA. No micronutrient coverage noted per H&P.    Pt reports she thinks she has lose wt but is not sure how much. Noted wt of 122.5 pounds in house, no new wt available at this time.

## 2019-11-15 NOTE — PROGRESS NOTE ADULT - SUBJECTIVE AND OBJECTIVE BOX
89y old  Female who presents with a chief complaint of fall (15 Nov 2019 08:27)      Interval history:  Afebrile, denies abdominal pain, no N/V.       Allergies:   sulfa drugs (Hives)      Antimicrobials:  ceFAZolin   IVPB 1000 milliGRAM(s) IV Intermittent every 12 hours      REVIEW OF SYSTEMS:  No chest pain   No SOB  No dysuria   No rash.       Vital Signs Last 24 Hrs  T(C): 36.7 (11-15-19 @ 13:04), Max: 37.7 (11-15-19 @ 00:38)  T(F): 98 (11-15-19 @ 13:04), Max: 99.8 (11-15-19 @ 00:38)  HR: 76 (11-15-19 @ 13:04) (72 - 85)  BP: 136/88 (11-15-19 @ 13:04) (104/67 - 147/76)  BP(mean): --  RR: 18 (11-15-19 @ 13:04) (18 - 18)  SpO2: 95% (11-15-19 @ 13:04) (95% - 96%)      PHYSICAL EXAM:  Patient in no acute distress. Alert, awake, sitting in recliner, confused.   Rt sided periorbital ecchymosis.  Cardiovascular: S1S2 normal.  Lungs: + air entry B/L lung fields.  Gastrointestinal: soft, nontender, nondistended.  Extremities: rt lower extremity edema and erythema, warmth improved.   IV sites not inflamed.                             10.4   9.64  )-----------( 359      ( 14 Nov 2019 09:25 )             33.2   11-14    138  |  101  |  15  ----------------------------<  83  3.7   |  23  |  0.73    Ca    8.4      14 Nov 2019 07:20    TPro  5.5<L>  /  Alb  2.3<L>  /  TBili  1.3<H>  /  DBili  x   /  AST  16  /  ALT  13  /  AlkPhos  262<H>  11-14      LIVER FUNCTIONS - ( 14 Nov 2019 07:20 )  Alb: 2.3 g/dL / Pro: 5.5 g/dL / ALK PHOS: 262 U/L / ALT: 13 U/L / AST: 16 U/L / GGT: x

## 2019-11-15 NOTE — DIETITIAN INITIAL EVALUATION ADULT. - ADD RECOMMEND
1. Recommend Ensure Enlive x 1 daily (350kcal, 20g protein per 8 ounces). 2. Encouraged PO intake-small, frequent meals and nutrient dense snacks. In house, encouraged pt to order nutrient dense snacks c meals to consume in between meals to mimic small, frequent meals. Discussed protein rich foods available on menu. Discussed consuming protein first at meal times and then eating the other foods. 3. Continue to provide assistance and encouragement w/ all meals and snacks.

## 2019-11-15 NOTE — PROGRESS NOTE ADULT - PROBLEM SELECTOR PROBLEM 7
Interval History: Pt was seen and examined. H/o and chart reviewed. Pt is a 83 y/o male with hypernatremia and ARIADNA. Baseline s Cr not known. Has a h/o of HTN, bladder cancer, dementia,and CVA. Pt was admitted for change in MS. Pt is non verbal and can not provided a h/o.     Review of patient's allergies indicates:   Allergen Reactions    Augmentin [amoxicillin-pot clavulanate]      Tolerates cephalosporins     Current Facility-Administered Medications   Medication Frequency    acetaminophen tablet 650 mg Q4H PRN    cefepime 2 g in dextrose 5% 50 mL IVPB (ready to mix system) Q24H    enoxaparin injection 30 mg Daily    influenza (FLUZONE HIGH-DOSE) vaccine 0.5 mL vaccine x 1 dose    memantine tablet 5 mg BID    OLANZapine tablet 5 mg QHS    pneumoc 13-konstantin conj-dip cr(PF) (PREVNAR 13 (PF)) 0.5 mL vaccine x 1 dose    pravastatin tablet 20 mg Daily    sterile water 1,000 mL with sodium chloride (23.4%) 4 mEq/mL 37.52 mEq infusion Continuous    tamsulosin 24 hr capsule 0.4 mg Daily    venlafaxine tablet 150 mg Daily       Objective:     Vital Signs (Most Recent):  Temp: 98.5 °F (36.9 °C) (03/22/19 1552)  Pulse: 92 (03/22/19 1715)  Resp: 18 (03/22/19 1552)  BP: 111/62 (03/22/19 1552)  SpO2: (!) 93 % (03/22/19 1552)  O2 Device (Oxygen Therapy): room air (03/22/19 0737) Vital Signs (24h Range):  Temp:  [98.2 °F (36.8 °C)-99.9 °F (37.7 °C)] 98.5 °F (36.9 °C)  Pulse:  [] 92  Resp:  [18-44] 18  SpO2:  [88 %-94 %] 93 %  BP: (111-163)/(62-83) 111/62     Weight: 60 kg (132 lb 4.4 oz) (03/22/19 0356)  Body mass index is 20.72 kg/m².  Body surface area is 1.68 meters squared.    I/O last 3 completed shifts:  In: 3097.5 [I.V.:2297.5; NG/GT:750; IV Piggyback:50]  Out: -     Physical Exam   Constitutional: He is oriented to person, place, and time. He appears well-developed.   Looks thin and malnourished   HENT:   Head: Normocephalic and atraumatic.   Neck: No JVD present.   Cardiovascular: Normal rate, regular  rhythm and normal heart sounds. Exam reveals no friction rub.   Pulmonary/Chest: Effort normal and breath sounds normal. No respiratory distress. He has no rales.   Abdominal: Soft. Bowel sounds are normal. He exhibits no distension. There is no guarding.   Musculoskeletal: He exhibits no edema.   Neurological: He is alert and oriented to person, place, and time.   Skin: Skin is warm and dry.   Psychiatric: He has a normal mood and affect. His behavior is normal.   Nursing note and vitals reviewed.      Significant Labs: reviewed  BMP  Lab Results   Component Value Date     (HH) 03/22/2019    K 3.8 03/22/2019    CL >130 (HH) 03/22/2019    CO2 15 (L) 03/22/2019    BUN 80 (H) 03/22/2019    CREATININE 2.5 (H) 03/22/2019    CALCIUM 9.0 03/22/2019    ANIONGAP Unable to calculate 03/22/2019    ESTGFRAFRICA 27 (A) 03/22/2019    EGFRNONAA 23 (A) 03/22/2019         Significant Imaging: reviewed   Discharge planning issues

## 2019-11-15 NOTE — PROGRESS NOTE ADULT - PROBLEM SELECTOR PLAN 2
as per ID , rt LE cellulitis:  day 6/10 of therapy today.   Switched cefazolin to Cefepime 1 gm iv q12h   leg elevation  consider ace wrap to decrease swelling if pt tolerates.       abnormal U/A with positive culture finding:  Pt asymptomatic,   urine cx with klebsiella likely represents asymptomatic bacteriuria.    CT ABD / PELVIS

## 2019-11-15 NOTE — DIETITIAN INITIAL EVALUATION ADULT. - PHYSICAL APPEARANCE
other (specify) Pt agreeable to nutrition focused physical exam, pt c moderate muscle loss around temples, mild muscle loss around shoulders, mild fat loss around tricep region and moderate to severe fat loss around orbital region.   Skin: intact  Edema: +2 right ankle and foot    ht: 5', wt: 122.5pounds, BMI: 23.9kg/m2, IBW: 100pounds +/- 10%

## 2019-11-15 NOTE — PROGRESS NOTE ADULT - SUBJECTIVE AND OBJECTIVE BOX
SUBJECTIVE / OVERNIGHT EVENTS: pt denies chest pain, shortness of breath     MEDICATIONS  (STANDING):  cefepime   IVPB 1000 milliGRAM(s) IV Intermittent every 12 hours  heparin  Injectable 5000 Unit(s) SubCutaneous every 12 hours  influenza   Vaccine 0.5 milliLiter(s) IntraMuscular once  pantoprazole    Tablet 40 milliGRAM(s) Oral before breakfast  ursodiol Capsule 300 milliGRAM(s) Oral two times a day    MEDICATIONS  (PRN):  acetaminophen   Tablet .. 650 milliGRAM(s) Oral every 6 hours PRN Temp greater or equal to 38C (100.4F), Mild Pain (1 - 3)    Vital Signs Last 24 Hrs  T(C): 37.7 (15 Nov 2019 17:03), Max: 37.7 (15 Nov 2019 00:38)  T(F): 99.9 (15 Nov 2019 17:03), Max: 99.9 (15 Nov 2019 17:03)  HR: 78 (15 Nov 2019 17:03) (72 - 81)  BP: 125/70 (15 Nov 2019 17:03) (104/67 - 147/76)  BP(mean): --  RR: 18 (15 Nov 2019 17:03) (18 - 18)  SpO2: 96% (15 Nov 2019 17:03) (95% - 96%)    Constitutional: No fever, fatigue  Skin: No rash.  Eyes: No recent vision problems or eye pain.  ENT: No congestion, ear pain, or sore throat.  Cardiovascular: No chest pain or palpation.  Respiratory: No cough, shortness of breath, congestion, or wheezing.  Gastrointestinal: No abdominal pain, nausea, vomiting, or diarrhea.  Genitourinary: No dysuria.  Musculoskeletal: No joint swelling.  Neurologic: No headache.    PHYSICAL EXAM:  GENERAL: NAD  EYES: EOMI, PERRLA  NECK: Supple, No JVD  CHEST/LUNG: dec breath sounds rt base  HEART:  S1 , S2 +  ABDOMEN: soft , bs+  EXTREMITIES:  trace edema+  NEUROLOGY:alert awake oriented    LLABS:      138  |  101  |  15  ----------------------------<  83  3.7   |  23  |  0.73    Ca    8.4      2019 07:20    TPro  5.5<L>  /  Alb  2.3<L>  /  TBili  1.3<H>  /  DBili      /  AST  16  /  ALT  13  /  AlkPhos  262<H>  11-14    Creatinine Trend: 0.73 <--, 0.71 <--, 0.72 <--, 0.77 <--, 0.82 <--, 0.85 <--, 0.99 <--                        10.4   9.64  )-----------( 359      ( 2019 09:25 )             33.2     Urine Studies:  Urinalysis Basic - ( 2019 00:18 )    Color: Yellow / Appearance: Slightly Turbid / S.022 / pH:   Gluc:  / Ketone: Negative  / Bili: Negative / Urobili: Negative   Blood:  / Protein: 100 mg/dL / Nitrite: Positive   Leuk Esterase: Small / RBC: 9 /hpf /  /HPF   Sq Epi:  / Non Sq Epi: 18 / Bacteria: Many              LIVER FUNCTIONS - ( 2019 07:20 )  Alb: 2.3 g/dL / Pro: 5.5 g/dL / ALK PHOS: 262 U/L / ALT: 13 U/L / AST: 16 U/L / GGT: x

## 2019-11-15 NOTE — CHART NOTE - NSCHARTNOTEFT_GEN_A_CORE
Upon Nutritional Assessment by the Registered Dietitian your patient was determined to meet criteria / has evidence of the following diagnosis/diagnoses:          [x]  Mild Protein Calorie Malnutrition        [ ]  Moderate Protein Calorie Malnutrition        [ ] Severe Protein Calorie Malnutrition        [ ] Unspecified Protein Calorie Malnutrition        [ ] Underweight / BMI <19        [ ] Morbid Obesity / BMI > 40      Findings as based on:  [x] Comprehensive nutrition assessment   [x] Nutrition Focused Physical Exam  [ ] Other:       Nutrition Plan/Recommendations:      Recommend Ensure Enlive x 1 daily (350kcal, 20g protein per 8 ounces). Encouraged PO intake-small, frequent meals and nutrient dense snacks. In house, encouraged pt to order nutrient dense snacks c meals to consume in between meals to mimic small, frequent meals. Discussed protein rich foods available on menu. Discussed consuming protein first at meal times and then eating the other foods.       PROVIDER Section:     By signing this assessment you are acknowledging and agree with the diagnosis/diagnoses assigned by the Registered Dietitian    Comments:

## 2019-11-15 NOTE — PROGRESS NOTE ADULT - SUBJECTIVE AND OBJECTIVE BOX
INTERVAL HPI/OVERNIGHT EVENTS:    MEDICATIONS  (STANDING):  ceFAZolin   IVPB 1000 milliGRAM(s) IV Intermittent every 12 hours  heparin  Injectable 5000 Unit(s) SubCutaneous every 12 hours  influenza   Vaccine 0.5 milliLiter(s) IntraMuscular once  pantoprazole    Tablet 40 milliGRAM(s) Oral before breakfast  ursodiol Capsule 300 milliGRAM(s) Oral two times a day    MEDICATIONS  (PRN):  acetaminophen   Tablet .. 650 milliGRAM(s) Oral every 6 hours PRN Temp greater or equal to 38C (100.4F), Mild Pain (1 - 3)      Allergies    sulfa drugs (Hives)    Intolerances            PHYSICAL EXAM:   Vital Signs:  Vital Signs Last 24 Hrs  T(C): 36.9 (15 Nov 2019 06:16), Max: 37.7 (15 Nov 2019 00:38)  T(F): 98.5 (15 Nov 2019 06:16), Max: 99.8 (15 Nov 2019 00:38)  HR: 72 (15 Nov 2019 06:16) (72 - 92)  BP: 147/76 (15 Nov 2019 06:16) (109/63 - 149/81)  BP(mean): --  RR: 18 (15 Nov 2019 06:16) (18 - 18)  SpO2: 95% (15 Nov 2019 06:16) (95% - 97%)  Daily     Daily     GENERAL:  no distress  HEENT:  NC/AT,  anicteric  CHEST:   no increased effort, breath sounds clear  HEART:  Regular rhythm  ABDOMEN:  Soft, non-tender, non-distended, normoactive bowel sounds,  no masses ,no hepato-splenomegaly, no signs of chronic liver disease  EXTEREMITIES:  no cyanosis      LABS:                        10.4   9.64  )-----------( 359      ( 14 Nov 2019 09:25 )             33.2     11-14    138  |  101  |  15  ----------------------------<  83  3.7   |  23  |  0.73    Ca    8.4      14 Nov 2019 07:20    TPro  5.5<L>  /  Alb  2.3<L>  /  TBili  1.3<H>  /  DBili  x   /  AST  16  /  ALT  13  /  AlkPhos  262<H>  11-14          RADIOLOGY & ADDITIONAL TESTS:

## 2019-11-15 NOTE — DIETITIAN INITIAL EVALUATION ADULT. - REASON INDICATOR FOR ASSESSMENT
Pt seen for LOS.  Source: pt and RN; pt forgetful    Pt admitted after mechanical fall at home, dehydrated, UTI, cellulitis, in house pt c abdominal pain and elevated bilirubin, noted plan for CT. Noted pt seen by Podiatry, pt c superficial RD wound, S/P excisional debridement.

## 2019-11-16 LAB
ALBUMIN SERPL ELPH-MCNC: 2.4 G/DL — LOW (ref 3.3–5)
ALP SERPL-CCNC: 243 U/L — HIGH (ref 40–120)
ALT FLD-CCNC: 9 U/L — LOW (ref 10–45)
ANION GAP SERPL CALC-SCNC: 7 MMOL/L — SIGNIFICANT CHANGE UP (ref 5–17)
AST SERPL-CCNC: 11 U/L — SIGNIFICANT CHANGE UP (ref 10–40)
BILIRUB SERPL-MCNC: 0.9 MG/DL — SIGNIFICANT CHANGE UP (ref 0.2–1.2)
BUN SERPL-MCNC: 15 MG/DL — SIGNIFICANT CHANGE UP (ref 7–23)
CALCIUM SERPL-MCNC: 8.7 MG/DL — SIGNIFICANT CHANGE UP (ref 8.4–10.5)
CHLORIDE SERPL-SCNC: 105 MMOL/L — SIGNIFICANT CHANGE UP (ref 96–108)
CO2 SERPL-SCNC: 24 MMOL/L — SIGNIFICANT CHANGE UP (ref 22–31)
CREAT SERPL-MCNC: 0.73 MG/DL — SIGNIFICANT CHANGE UP (ref 0.5–1.3)
GLUCOSE SERPL-MCNC: 106 MG/DL — HIGH (ref 70–99)
HCT VFR BLD CALC: 33.6 % — LOW (ref 34.5–45)
HGB BLD-MCNC: 10.5 G/DL — LOW (ref 11.5–15.5)
MCHC RBC-ENTMCNC: 31.3 GM/DL — LOW (ref 32–36)
MCHC RBC-ENTMCNC: 32.9 PG — SIGNIFICANT CHANGE UP (ref 27–34)
MCV RBC AUTO: 105.3 FL — HIGH (ref 80–100)
PLATELET # BLD AUTO: 384 K/UL — SIGNIFICANT CHANGE UP (ref 150–400)
POTASSIUM SERPL-MCNC: 3.8 MMOL/L — SIGNIFICANT CHANGE UP (ref 3.5–5.3)
POTASSIUM SERPL-SCNC: 3.8 MMOL/L — SIGNIFICANT CHANGE UP (ref 3.5–5.3)
PROT SERPL-MCNC: 5.7 G/DL — LOW (ref 6–8.3)
RBC # BLD: 3.19 M/UL — LOW (ref 3.8–5.2)
RBC # FLD: 14.6 % — HIGH (ref 10.3–14.5)
SODIUM SERPL-SCNC: 136 MMOL/L — SIGNIFICANT CHANGE UP (ref 135–145)
WBC # BLD: 10.06 K/UL — SIGNIFICANT CHANGE UP (ref 3.8–10.5)
WBC # FLD AUTO: 10.06 K/UL — SIGNIFICANT CHANGE UP (ref 3.8–10.5)

## 2019-11-16 RX ADMIN — URSODIOL 300 MILLIGRAM(S): 250 TABLET, FILM COATED ORAL at 18:23

## 2019-11-16 RX ADMIN — PANTOPRAZOLE SODIUM 40 MILLIGRAM(S): 20 TABLET, DELAYED RELEASE ORAL at 05:08

## 2019-11-16 RX ADMIN — HEPARIN SODIUM 5000 UNIT(S): 5000 INJECTION INTRAVENOUS; SUBCUTANEOUS at 05:08

## 2019-11-16 RX ADMIN — HEPARIN SODIUM 5000 UNIT(S): 5000 INJECTION INTRAVENOUS; SUBCUTANEOUS at 17:13

## 2019-11-16 RX ADMIN — URSODIOL 300 MILLIGRAM(S): 250 TABLET, FILM COATED ORAL at 05:07

## 2019-11-16 RX ADMIN — CEFEPIME 100 MILLIGRAM(S): 1 INJECTION, POWDER, FOR SOLUTION INTRAMUSCULAR; INTRAVENOUS at 17:15

## 2019-11-16 RX ADMIN — CEFEPIME 100 MILLIGRAM(S): 1 INJECTION, POWDER, FOR SOLUTION INTRAMUSCULAR; INTRAVENOUS at 05:08

## 2019-11-16 NOTE — PROGRESS NOTE ADULT - SUBJECTIVE AND OBJECTIVE BOX
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  History of Spinal Stenosis  H/O: Osteoarthritis  Hernia, Hiatal  Asthma  MVP (Mitral Valve Prolapse)  Functional Heart Murmur  HLD (Hyperlipidemia)  H/O: Hypertension  S/P Colonoscopy  Bilateral Cataracts removed 2010  S/P Hip Replacement  2002    FAMILY HISTORY:    MEDICATIONS  (STANDING):  cefepime   IVPB 1000 milliGRAM(s) IV Intermittent every 12 hours  heparin  Injectable 5000 Unit(s) SubCutaneous every 12 hours  influenza   Vaccine 0.5 milliLiter(s) IntraMuscular once  pantoprazole    Tablet 40 milliGRAM(s) Oral before breakfast  ursodiol Capsule 300 milliGRAM(s) Oral two times a day    MEDICATIONS  (PRN):  acetaminophen   Tablet .. 650 milliGRAM(s) Oral every 6 hours PRN Temp greater or equal to 38C (100.4F), Mild Pain (1 - 3)      Vital Signs Last 24 Hrs  T(C): 36.4 (16 Nov 2019 09:33), Max: 37.7 (15 Nov 2019 17:03)  T(F): 97.6 (16 Nov 2019 09:33), Max: 99.9 (15 Nov 2019 17:03)  HR: 63 (16 Nov 2019 09:33) (63 - 78)  BP: 146/87 (16 Nov 2019 09:33) (125/70 - 146/87)  BP(mean): --  RR: 18 (16 Nov 2019 09:33) (18 - 18)  SpO2: 96% (16 Nov 2019 09:33) (95% - 96%)  nc/at  s1s2  cta  soft, nt, nd no guarding or rebound  no c/c/e      11-16    136  |  105  |  15  ----------------------------<  106<H>  3.8   |  24  |  0.73    Ca    8.7      16 Nov 2019 06:16    TPro  5.7<L>  /  Alb  2.4<L>  /  TBili  0.9  /  DBili  x   /  AST  11  /  ALT  9<L>  /  AlkPhos  243<H>  11-16

## 2019-11-16 NOTE — PROGRESS NOTE ADULT - SUBJECTIVE AND OBJECTIVE BOX
SUBJECTIVE / OVERNIGHT EVENTS: pt denies chest pain, shortness of breath     MEDICATIONS  (STANDING):  cefepime   IVPB 1000 milliGRAM(s) IV Intermittent every 12 hours  heparin  Injectable 5000 Unit(s) SubCutaneous every 12 hours  influenza   Vaccine 0.5 milliLiter(s) IntraMuscular once  pantoprazole    Tablet 40 milliGRAM(s) Oral before breakfast  ursodiol Capsule 300 milliGRAM(s) Oral two times a day    MEDICATIONS  (PRN):  acetaminophen   Tablet .. 650 milliGRAM(s) Oral every 6 hours PRN Temp greater or equal to 38C (100.4F), Mild Pain (1 - 3)      Vital Signs Last 24 Hrs  T(C): 36.4 (16 Nov 2019 09:33), Max: 37.7 (15 Nov 2019 17:03)  T(F): 97.6 (16 Nov 2019 09:33), Max: 99.9 (15 Nov 2019 17:03)  HR: 63 (16 Nov 2019 09:33) (63 - 78)  BP: 146/87 (16 Nov 2019 09:33) (125/70 - 146/87)  BP(mean): --  RR: 18 (16 Nov 2019 09:33) (18 - 18)  SpO2: 96% (16 Nov 2019 09:33) (95% - 96%)  Constitutional: No fever, fatigue  Skin: No rash.  Eyes: No recent vision problems or eye pain.  ENT: No congestion, ear pain, or sore throat.  Cardiovascular: No chest pain or palpation.  Respiratory: No cough, shortness of breath, congestion, or wheezing.  Gastrointestinal: No abdominal pain, nausea, vomiting, or diarrhea.  Genitourinary: No dysuria.  Musculoskeletal: No joint swelling.  Neurologic: No headache.    PHYSICAL EXAM:  GENERAL: NAD  EYES: EOMI, PERRLA  NECK: Supple, No JVD  CHEST/LUNG: dec breath sounds rt base  HEART:  S1 , S2 +  ABDOMEN: soft , bs+  EXTREMITIES:  trace edema+  NEUROLOGY:alert awake oriented intermittently confused     LABS:  11-16    136  |  105  |  15  ----------------------------<  106<H>  3.8   |  24  |  0.73    Ca    8.7      16 Nov 2019 06:16    TPro  5.7<L>  /  Alb  2.4<L>  /  TBili  0.9  /  DBili      /  AST  11  /  ALT  9<L>  /  AlkPhos  243<H>  11-16    Creatinine Trend: 0.73 <--, 0.73 <--, 0.71 <--, 0.72 <--, 0.77 <--, 0.82 <--                        10.5   10.06 )-----------( 384      ( 16 Nov 2019 10:28 )             33.6     Urine Studies:            LIVER FUNCTIONS - ( 16 Nov 2019 06:16 )  Alb: 2.4 g/dL / Pro: 5.7 g/dL / ALK PHOS: 243 U/L / ALT: 9 U/L / AST: 11 U/L / GGT: x                     LIVER FUNCTIONS - ( 14 Nov 2019 07:20 )  Alb: 2.3 g/dL / Pro: 5.5 g/dL / ALK PHOS: 262 U/L / ALT: 13 U/L / AST: 16 U/L / GGT: x

## 2019-11-16 NOTE — PROGRESS NOTE ADULT - PROBLEM SELECTOR PLAN 2
as per ID , rt LE cellulitis:    Switched cefazolin to Cefepime 1 gm iv q12h   leg elevation  consider ace wrap to decrease swelling if pt tolerates.       abnormal U/A with positive culture finding:  Pt asymptomatic,   urine cx with klebsiella likely represents asymptomatic bacteriuria.    CT ABD / PELVIS< from: CT Abdomen and Pelvis w/ IV Cont (11.15.19 @ 19:55) >    Very subtle patchy wedge-shaped areas of heterogeneous enhancement   involving the right kidney may represent subtle evidence of   pyelonephritis.      < end of copied text >

## 2019-11-17 LAB
ALBUMIN SERPL ELPH-MCNC: 2.4 G/DL — LOW (ref 3.3–5)
ALP SERPL-CCNC: 235 U/L — HIGH (ref 40–120)
ALT FLD-CCNC: 8 U/L — LOW (ref 10–45)
ANION GAP SERPL CALC-SCNC: 10 MMOL/L — SIGNIFICANT CHANGE UP (ref 5–17)
AST SERPL-CCNC: 12 U/L — SIGNIFICANT CHANGE UP (ref 10–40)
BILIRUB SERPL-MCNC: 0.9 MG/DL — SIGNIFICANT CHANGE UP (ref 0.2–1.2)
BUN SERPL-MCNC: 17 MG/DL — SIGNIFICANT CHANGE UP (ref 7–23)
CALCIUM SERPL-MCNC: 8.8 MG/DL — SIGNIFICANT CHANGE UP (ref 8.4–10.5)
CHLORIDE SERPL-SCNC: 104 MMOL/L — SIGNIFICANT CHANGE UP (ref 96–108)
CO2 SERPL-SCNC: 24 MMOL/L — SIGNIFICANT CHANGE UP (ref 22–31)
CREAT SERPL-MCNC: 0.74 MG/DL — SIGNIFICANT CHANGE UP (ref 0.5–1.3)
GLUCOSE SERPL-MCNC: 96 MG/DL — SIGNIFICANT CHANGE UP (ref 70–99)
HCT VFR BLD CALC: 34.3 % — LOW (ref 34.5–45)
HGB BLD-MCNC: 10.7 G/DL — LOW (ref 11.5–15.5)
MCHC RBC-ENTMCNC: 31.2 GM/DL — LOW (ref 32–36)
MCHC RBC-ENTMCNC: 32.9 PG — SIGNIFICANT CHANGE UP (ref 27–34)
MCV RBC AUTO: 105.5 FL — HIGH (ref 80–100)
PLATELET # BLD AUTO: 420 K/UL — HIGH (ref 150–400)
POTASSIUM SERPL-MCNC: 4.3 MMOL/L — SIGNIFICANT CHANGE UP (ref 3.5–5.3)
POTASSIUM SERPL-SCNC: 4.3 MMOL/L — SIGNIFICANT CHANGE UP (ref 3.5–5.3)
PROT SERPL-MCNC: 5.7 G/DL — LOW (ref 6–8.3)
RBC # BLD: 3.25 M/UL — LOW (ref 3.8–5.2)
RBC # FLD: 14.6 % — HIGH (ref 10.3–14.5)
SODIUM SERPL-SCNC: 138 MMOL/L — SIGNIFICANT CHANGE UP (ref 135–145)
WBC # BLD: 10.43 K/UL — SIGNIFICANT CHANGE UP (ref 3.8–10.5)
WBC # FLD AUTO: 10.43 K/UL — SIGNIFICANT CHANGE UP (ref 3.8–10.5)

## 2019-11-17 PROCEDURE — 99232 SBSQ HOSP IP/OBS MODERATE 35: CPT

## 2019-11-17 RX ADMIN — CEFEPIME 100 MILLIGRAM(S): 1 INJECTION, POWDER, FOR SOLUTION INTRAMUSCULAR; INTRAVENOUS at 19:09

## 2019-11-17 RX ADMIN — HEPARIN SODIUM 5000 UNIT(S): 5000 INJECTION INTRAVENOUS; SUBCUTANEOUS at 05:37

## 2019-11-17 RX ADMIN — HEPARIN SODIUM 5000 UNIT(S): 5000 INJECTION INTRAVENOUS; SUBCUTANEOUS at 19:09

## 2019-11-17 RX ADMIN — PANTOPRAZOLE SODIUM 40 MILLIGRAM(S): 20 TABLET, DELAYED RELEASE ORAL at 05:37

## 2019-11-17 RX ADMIN — URSODIOL 300 MILLIGRAM(S): 250 TABLET, FILM COATED ORAL at 19:09

## 2019-11-17 RX ADMIN — CEFEPIME 100 MILLIGRAM(S): 1 INJECTION, POWDER, FOR SOLUTION INTRAMUSCULAR; INTRAVENOUS at 05:38

## 2019-11-17 RX ADMIN — URSODIOL 300 MILLIGRAM(S): 250 TABLET, FILM COATED ORAL at 05:38

## 2019-11-17 NOTE — PROGRESS NOTE ADULT - SUBJECTIVE AND OBJECTIVE BOX
89y old  Female who presents with a chief complaint of fall (17 Nov 2019 11:19)      Interval history:  Afebrile, denies any complains,     Allergies:  sulfa drugs (Hives)    Antimicrobials:  cefepime   IVPB 1000 milliGRAM(s) IV Intermittent every 12 hours    REVIEW OF SYSTEMS:    No chest pain or palpitations  No cough, no SOB  No N/V/D, no abdominal pain  No dysuria or frequency  No rash.     Vital Signs Last 24 Hrs  T(C): 36.7 (11-17-19 @ 09:44), Max: 37.1 (11-16-19 @ 17:49)  T(F): 98.1 (11-17-19 @ 09:44), Max: 98.8 (11-16-19 @ 17:49)  HR: 64 (11-17-19 @ 09:44) (64 - 81)  BP: 147/73 (11-17-19 @ 09:44) (121/74 - 148/68)  BP(mean): --  RR: 18 (11-17-19 @ 09:44) (17 - 18)  SpO2: 97% (11-17-19 @ 09:44) (97% - 98%)    PHYSICAL EXAM:                            10.7   10.43 )-----------( 420      ( 17 Nov 2019 09:21 )             34.3   11-17    138  |  104  |  17  ----------------------------<  96  4.3   |  24  |  0.74    Ca    8.8      17 Nov 2019 06:41    TPro  5.7<L>  /  Alb  2.4<L>  /  TBili  0.9  /  DBili  x   /  AST  12  /  ALT  8<L>  /  AlkPhos  235<H>  11-17      LIVER FUNCTIONS - ( 17 Nov 2019 06:41 )  Alb: 2.4 g/dL / Pro: 5.7 g/dL / ALK PHOS: 235 U/L / ALT: 8 U/L / AST: 12 U/L / GGT: x                 Radiology: 89y old  Female who presents with a chief complaint of fall (17 Nov 2019 11:19)      Interval history:  Afebrile, denies any complains.     Allergies:  sulfa drugs (Hives)    Antimicrobials:  cefepime   IVPB 1000 milliGRAM(s) IV Intermittent every 12 hours    REVIEW OF SYSTEMS:  No chest pain   No SOB  No N/V  No dysuria   No rash.     Vital Signs Last 24 Hrs  T(C): 36.7 (11-17-19 @ 09:44), Max: 37.1 (11-16-19 @ 17:49)  T(F): 98.1 (11-17-19 @ 09:44), Max: 98.8 (11-16-19 @ 17:49)  HR: 64 (11-17-19 @ 09:44) (64 - 81)  BP: 147/73 (11-17-19 @ 09:44) (121/74 - 148/68)  BP(mean): --  RR: 18 (11-17-19 @ 09:44) (17 - 18)  SpO2: 97% (11-17-19 @ 09:44) (97% - 98%)      PHYSICAL EXAM:  Patient in no acute distress. Alert, awake, sitting in recliner, confused.   Rt sided periorbital ecchymosis.  Cardiovascular: S1S2 normal.  Lungs: + air entry B/L lung fields.  Gastrointestinal: soft, nontender, nondistended.  Extremities: rt lower extremity edema persists, erythema, warmth improved.   IV sites not inflamed.                               10.7   10.43 )-----------( 420      ( 17 Nov 2019 09:21 )             34.3   11-17    138  |  104  |  17  ----------------------------<  96  4.3   |  24  |  0.74    Ca    8.8      17 Nov 2019 06:41    TPro  5.7<L>  /  Alb  2.4<L>  /  TBili  0.9  /  DBili  x   /  AST  12  /  ALT  8<L>  /  AlkPhos  235<H>  11-17      LIVER FUNCTIONS - ( 17 Nov 2019 06:41 )  Alb: 2.4 g/dL / Pro: 5.7 g/dL / ALK PHOS: 235 U/L / ALT: 8 U/L / AST: 12 U/L / GGT: x             Radiology:  < from: CT Abdomen and Pelvis w/ IV Cont (11.15.19 @ 19:55) >  IMPRESSION:     Very subtle patchy wedge-shaped areas of heterogeneous enhancement   involving the right kidney may represent subtle evidence of   pyelonephritis.

## 2019-11-17 NOTE — PROGRESS NOTE ADULT - SUBJECTIVE AND OBJECTIVE BOX
SUBJECTIVE / OVERNIGHT EVENTS: pt denies chest pain, shortness of breath     MEDICATIONS  (STANDING):  cefepime   IVPB 1000 milliGRAM(s) IV Intermittent every 12 hours  heparin  Injectable 5000 Unit(s) SubCutaneous every 12 hours  pantoprazole    Tablet 40 milliGRAM(s) Oral before breakfast  ursodiol Capsule 300 milliGRAM(s) Oral two times a day    MEDICATIONS  (PRN):  acetaminophen   Tablet .. 650 milliGRAM(s) Oral every 6 hours PRN Temp greater or equal to 38C (100.4F), Mild Pain (1 - 3)    Vital Signs Last 24 Hrs  T(C): 37.4 (17 Nov 2019 21:49), Max: 37.4 (17 Nov 2019 21:49)  T(F): 99.3 (17 Nov 2019 21:49), Max: 99.3 (17 Nov 2019 21:49)  HR: 79 (17 Nov 2019 21:49) (64 - 80)  BP: 135/83 (17 Nov 2019 21:49) (125/74 - 147/73)  BP(mean): --  RR: 18 (17 Nov 2019 21:49) (18 - 18)  SpO2: 97% (17 Nov 2019 21:49) (95% - 98%)    Constitutional: No fever, fatigue  Skin: No rash.  Eyes: No recent vision problems or eye pain.  ENT: No congestion, ear pain, or sore throat.  Cardiovascular: No chest pain or palpation.  Respiratory: No cough, shortness of breath, congestion, or wheezing.  Gastrointestinal: No abdominal pain, nausea, vomiting, or diarrhea.  Genitourinary: No dysuria.  Musculoskeletal: No joint swelling.  Neurologic: No headache.    PHYSICAL EXAM:  GENERAL: NAD  EYES: EOMI, PERRLA  NECK: Supple, No JVD  CHEST/LUNG: dec breath sounds rt base  HEART:  S1 , S2 +  ABDOMEN: soft , bs+  EXTREMITIES:  trace edema+  NEUROLOGY:alert awake oriented intermittently confused     LABS:  11-17    138  |  104  |  17  ----------------------------<  96  4.3   |  24  |  0.74    Ca    8.8      17 Nov 2019 06:41    TPro  5.7<L>  /  Alb  2.4<L>  /  TBili  0.9  /  DBili      /  AST  12  /  ALT  8<L>  /  AlkPhos  235<H>  11-17    Creatinine Trend: 0.74 <--, 0.73 <--, 0.73 <--, 0.71 <--, 0.72 <--, 0.77 <--                        10.7   10.43 )-----------( 420      ( 17 Nov 2019 09:21 )             34.3     Urine Studies:            LIVER FUNCTIONS - ( 17 Nov 2019 06:41 )  Alb: 2.4 g/dL / Pro: 5.7 g/dL / ALK PHOS: 235 U/L / ALT: 8 U/L / AST: 12 U/L / GGT: x             LIVER FUNCTIONS - ( 16 Nov 2019 06:16 )  Alb: 2.4 g/dL / Pro: 5.7 g/dL / ALK PHOS: 243 U/L / ALT: 9 U/L / AST: 11 U/L / GGT: x                     LIVER FUNCTIONS - ( 14 Nov 2019 07:20 )  Alb: 2.3 g/dL / Pro: 5.5 g/dL / ALK PHOS: 262 U/L / ALT: 13 U/L / AST: 16 U/L / GGT: x

## 2019-11-17 NOTE — PROGRESS NOTE ADULT - ATTENDING COMMENTS
Katey Villela  Pager: [phone]. If no response or past 5 pm call [phone].     Please call ID service for questions over weekend at [phone].
Katey Villela  Pager: [phone]. If no response or past 5 pm call [phone].
Katey Villela  Pager: [phone]. If no response or past 5 pm call [phone].     Will sign off, please call with questions.
Katey Villela  Pager: [phone]. If no response or past 5 pm call [phone].
Katey Villela  Pager: [phone]. If no response or past 5 pm call [phone].

## 2019-11-17 NOTE — PROGRESS NOTE ADULT - SUBJECTIVE AND OBJECTIVE BOX
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 650 milliGRAM(s) Oral every 6 hours PRN Temp greater or equal to 38C (100.4F), Mild Pain (1 - 3)      Vital Signs Last 24 Hrs  T(C): 36.7 (17 Nov 2019 09:44), Max: 37.1 (16 Nov 2019 17:49)  T(F): 98.1 (17 Nov 2019 09:44), Max: 98.8 (16 Nov 2019 17:49)  HR: 64 (17 Nov 2019 09:44) (64 - 81)  BP: 147/73 (17 Nov 2019 09:44) (121/74 - 148/68)  BP(mean): --  RR: 18 (17 Nov 2019 09:44) (17 - 18)  SpO2: 97% (17 Nov 2019 09:44) (97% - 98%)  nc/at  s1s2  cta  soft, nt, nd no guarding or rebound  no c/c/e    CBC Full  -  ( 17 Nov 2019 09:21 )  WBC Count : 10.43 K/uL  RBC Count : 3.25 M/uL  Hemoglobin : 10.7 g/dL  Hematocrit : 34.3 %  Platelet Count - Automated : 420 K/uL  Mean Cell Volume : 105.5 fl  Mean Cell Hemoglobin : 32.9 pg  Mean Cell Hemoglobin Concentration : 31.2 gm/dL  Auto Neutrophil # : x  Auto Lymphocyte # : x  Auto Monocyte # : x  Auto Eosinophil # : x  Auto Basophil # : x  Auto Neutrophil % : x  Auto Lymphocyte % : x  Auto Monocyte % : x  Auto Eosinophil % : x  Auto Basophil % : x    11-17    138  |  104  |  17  ----------------------------<  96  4.3   |  24  |  0.74    Ca    8.8      17 Nov 2019 06:41    TPro  5.7<L>  /  Alb  2.4<L>  /  TBili  0.9  /  DBili  x   /  AST  12  /  ALT  8<L>  /  AlkPhos  235<H>  11-17

## 2019-11-18 ENCOUNTER — TRANSCRIPTION ENCOUNTER (OUTPATIENT)
Age: 84
End: 2019-11-18

## 2019-11-18 VITALS
HEART RATE: 72 BPM | SYSTOLIC BLOOD PRESSURE: 115 MMHG | DIASTOLIC BLOOD PRESSURE: 58 MMHG | TEMPERATURE: 97 F | RESPIRATION RATE: 18 BRPM | OXYGEN SATURATION: 98 %

## 2019-11-18 LAB
ALBUMIN SERPL ELPH-MCNC: 2.4 G/DL — LOW (ref 3.3–5)
ALP SERPL-CCNC: 214 U/L — HIGH (ref 40–120)
ALT FLD-CCNC: 8 U/L — LOW (ref 10–45)
ANION GAP SERPL CALC-SCNC: 9 MMOL/L — SIGNIFICANT CHANGE UP (ref 5–17)
AST SERPL-CCNC: 10 U/L — SIGNIFICANT CHANGE UP (ref 10–40)
BASOPHILS # BLD AUTO: 0 K/UL — SIGNIFICANT CHANGE UP (ref 0–0.2)
BASOPHILS NFR BLD AUTO: 0 % — SIGNIFICANT CHANGE UP (ref 0–2)
BILIRUB SERPL-MCNC: 1 MG/DL — SIGNIFICANT CHANGE UP (ref 0.2–1.2)
BUN SERPL-MCNC: 18 MG/DL — SIGNIFICANT CHANGE UP (ref 7–23)
CALCIUM SERPL-MCNC: 8.4 MG/DL — SIGNIFICANT CHANGE UP (ref 8.4–10.5)
CHLORIDE SERPL-SCNC: 103 MMOL/L — SIGNIFICANT CHANGE UP (ref 96–108)
CO2 SERPL-SCNC: 25 MMOL/L — SIGNIFICANT CHANGE UP (ref 22–31)
CREAT SERPL-MCNC: 0.69 MG/DL — SIGNIFICANT CHANGE UP (ref 0.5–1.3)
EOSINOPHIL # BLD AUTO: 0.09 K/UL — SIGNIFICANT CHANGE UP (ref 0–0.5)
EOSINOPHIL NFR BLD AUTO: 0.9 % — SIGNIFICANT CHANGE UP (ref 0–6)
GLUCOSE SERPL-MCNC: 112 MG/DL — HIGH (ref 70–99)
HCT VFR BLD CALC: 34.8 % — SIGNIFICANT CHANGE UP (ref 34.5–45)
HGB BLD-MCNC: 10.8 G/DL — LOW (ref 11.5–15.5)
LYMPHOCYTES # BLD AUTO: 0.53 K/UL — LOW (ref 1–3.3)
LYMPHOCYTES # BLD AUTO: 5.3 % — LOW (ref 13–44)
MANUAL SMEAR VERIFICATION: SIGNIFICANT CHANGE UP
MCHC RBC-ENTMCNC: 31 GM/DL — LOW (ref 32–36)
MCHC RBC-ENTMCNC: 32.6 PG — SIGNIFICANT CHANGE UP (ref 27–34)
MCV RBC AUTO: 105.1 FL — HIGH (ref 80–100)
METAMYELOCYTES # FLD: 0.9 % — HIGH (ref 0–0)
MONOCYTES # BLD AUTO: 0.45 K/UL — SIGNIFICANT CHANGE UP (ref 0–0.9)
MONOCYTES NFR BLD AUTO: 4.5 % — SIGNIFICANT CHANGE UP (ref 2–14)
NEUTROPHILS # BLD AUTO: 8.77 K/UL — HIGH (ref 1.8–7.4)
NEUTROPHILS NFR BLD AUTO: 88.4 % — HIGH (ref 43–77)
PLAT MORPH BLD: NORMAL — SIGNIFICANT CHANGE UP
PLATELET # BLD AUTO: 449 K/UL — HIGH (ref 150–400)
POTASSIUM SERPL-MCNC: 3.8 MMOL/L — SIGNIFICANT CHANGE UP (ref 3.5–5.3)
POTASSIUM SERPL-SCNC: 3.8 MMOL/L — SIGNIFICANT CHANGE UP (ref 3.5–5.3)
PROT SERPL-MCNC: 5.6 G/DL — LOW (ref 6–8.3)
RBC # BLD: 3.31 M/UL — LOW (ref 3.8–5.2)
RBC # FLD: 14.6 % — HIGH (ref 10.3–14.5)
RBC BLD AUTO: SIGNIFICANT CHANGE UP
SODIUM SERPL-SCNC: 137 MMOL/L — SIGNIFICANT CHANGE UP (ref 135–145)
WBC # BLD: 9.92 K/UL — SIGNIFICANT CHANGE UP (ref 3.8–10.5)
WBC # FLD AUTO: 9.92 K/UL — SIGNIFICANT CHANGE UP (ref 3.8–10.5)

## 2019-11-18 PROCEDURE — 87186 SC STD MICRODIL/AGAR DIL: CPT

## 2019-11-18 PROCEDURE — 70450 CT HEAD/BRAIN W/O DYE: CPT

## 2019-11-18 PROCEDURE — 99285 EMERGENCY DEPT VISIT HI MDM: CPT | Mod: 25

## 2019-11-18 PROCEDURE — 83615 LACTATE (LD) (LDH) ENZYME: CPT

## 2019-11-18 PROCEDURE — 84145 PROCALCITONIN (PCT): CPT

## 2019-11-18 PROCEDURE — 76377 3D RENDER W/INTRP POSTPROCES: CPT

## 2019-11-18 PROCEDURE — 93971 EXTREMITY STUDY: CPT

## 2019-11-18 PROCEDURE — 87086 URINE CULTURE/COLONY COUNT: CPT

## 2019-11-18 PROCEDURE — 90471 IMMUNIZATION ADMIN: CPT

## 2019-11-18 PROCEDURE — 71045 X-RAY EXAM CHEST 1 VIEW: CPT

## 2019-11-18 PROCEDURE — 85027 COMPLETE CBC AUTOMATED: CPT

## 2019-11-18 PROCEDURE — 90686 IIV4 VACC NO PRSV 0.5 ML IM: CPT

## 2019-11-18 PROCEDURE — 97116 GAIT TRAINING THERAPY: CPT

## 2019-11-18 PROCEDURE — 76700 US EXAM ABDOM COMPLETE: CPT

## 2019-11-18 PROCEDURE — 97530 THERAPEUTIC ACTIVITIES: CPT

## 2019-11-18 PROCEDURE — 74177 CT ABD & PELVIS W/CONTRAST: CPT

## 2019-11-18 PROCEDURE — 90715 TDAP VACCINE 7 YRS/> IM: CPT

## 2019-11-18 PROCEDURE — 83880 ASSAY OF NATRIURETIC PEPTIDE: CPT

## 2019-11-18 PROCEDURE — 96374 THER/PROPH/DIAG INJ IV PUSH: CPT

## 2019-11-18 PROCEDURE — 73630 X-RAY EXAM OF FOOT: CPT

## 2019-11-18 PROCEDURE — 97110 THERAPEUTIC EXERCISES: CPT

## 2019-11-18 PROCEDURE — 82248 BILIRUBIN DIRECT: CPT

## 2019-11-18 PROCEDURE — 97112 NEUROMUSCULAR REEDUCATION: CPT

## 2019-11-18 PROCEDURE — 82247 BILIRUBIN TOTAL: CPT

## 2019-11-18 PROCEDURE — 93005 ELECTROCARDIOGRAM TRACING: CPT

## 2019-11-18 PROCEDURE — 70486 CT MAXILLOFACIAL W/O DYE: CPT

## 2019-11-18 PROCEDURE — 83010 ASSAY OF HAPTOGLOBIN QUANT: CPT

## 2019-11-18 PROCEDURE — 82977 ASSAY OF GGT: CPT

## 2019-11-18 PROCEDURE — 80053 COMPREHEN METABOLIC PANEL: CPT

## 2019-11-18 PROCEDURE — 81001 URINALYSIS AUTO W/SCOPE: CPT

## 2019-11-18 PROCEDURE — 97161 PT EVAL LOW COMPLEX 20 MIN: CPT

## 2019-11-18 RX ORDER — URSODIOL 250 MG/1
1 TABLET, FILM COATED ORAL
Qty: 0 | Refills: 0 | DISCHARGE
Start: 2019-11-18

## 2019-11-18 RX ADMIN — HEPARIN SODIUM 5000 UNIT(S): 5000 INJECTION INTRAVENOUS; SUBCUTANEOUS at 05:13

## 2019-11-18 RX ADMIN — URSODIOL 300 MILLIGRAM(S): 250 TABLET, FILM COATED ORAL at 05:13

## 2019-11-18 RX ADMIN — PANTOPRAZOLE SODIUM 40 MILLIGRAM(S): 20 TABLET, DELAYED RELEASE ORAL at 06:12

## 2019-11-18 RX ADMIN — CEFEPIME 100 MILLIGRAM(S): 1 INJECTION, POWDER, FOR SOLUTION INTRAMUSCULAR; INTRAVENOUS at 05:13

## 2019-11-18 RX ADMIN — INFLUENZA VIRUS VACCINE 0.5 MILLILITER(S): 15; 15; 15; 15 SUSPENSION INTRAMUSCULAR at 14:54

## 2019-11-18 NOTE — DISCHARGE NOTE PROVIDER - NSDCCPCAREPLAN_GEN_ALL_CORE_FT
PRINCIPAL DISCHARGE DIAGNOSIS  Diagnosis: Cellulitis  Assessment and Plan of Treatment: You have had an infection of your Rt Leg which has been treated with antibiotics. Your Keflex will be completed on 11/19/2019 after the third dose of the day. Keep legs elevated whenever possible and apply ACE wraps to lower legs as tolerated. Follow up with your Primary MD when discharged from rehab.      SECONDARY DISCHARGE DIAGNOSES  Diagnosis: UTI (urinary tract infection)  Assessment and Plan of Treatment: Treatment has been completed with antibiotics although you likely have a chronic colonization of bacteria in your urine which does not need further antibiotics unless you become symptomatic with fever, urinary discomfort, flank pain in which case you need to see your physician.    Diagnosis: Fall  Assessment and Plan of Treatment: You are being discharge to rehab to inporve your strength and ability to stand and walk safely to prevent further falls.

## 2019-11-18 NOTE — DISCHARGE NOTE PROVIDER - NSDCMRMEDTOKEN_GEN_ALL_CORE_FT
Keflex 500 mg oral capsule: 1 cap(s) orally every 8 hours x 2 days  omeprazole 20 mg oral delayed release capsule: 1 cap(s) orally 2 times a day  ursodiol 300 mg oral capsule: 1 cap(s) orally 2 times a day

## 2019-11-18 NOTE — PROGRESS NOTE ADULT - SUBJECTIVE AND OBJECTIVE BOX
PARAG SINCLAIR:[id number],   89yFemale followed for:  sulfa drugs (Hives)    PAST MEDICAL & SURGICAL HISTORY:  Prolapse of Vaginal Wall  Urinary Frequency  Glaucoma  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  History of Spinal Stenosis  H/O: Osteoarthritis  Hernia, Hiatal  Asthma  MVP (Mitral Valve Prolapse)  Functional Heart Murmur  HLD (Hyperlipidemia)  H/O: Hypertension  S/P Colonoscopy  Bilateral Cataracts removed 2010  S/P Hip Replacement  2002    FAMILY HISTORY:    MEDICATIONS  (STANDING):  cefepime   IVPB 1000 milliGRAM(s) IV Intermittent every 12 hours  heparin  Injectable 5000 Unit(s) SubCutaneous every 12 hours  pantoprazole    Tablet 40 milliGRAM(s) Oral before breakfast  ursodiol Capsule 300 milliGRAM(s) Oral two times a day    MEDICATIONS  (PRN):  acetaminophen   Tablet .. 650 milliGRAM(s) Oral every 6 hours PRN Temp greater or equal to 38C (100.4F), Mild Pain (1 - 3)      Vital Signs Last 24 Hrs  T(C): 36.6 (18 Nov 2019 05:49), Max: 37.4 (17 Nov 2019 21:49)  T(F): 97.8 (18 Nov 2019 05:49), Max: 99.3 (17 Nov 2019 21:49)  HR: 65 (18 Nov 2019 05:49) (64 - 80)  BP: 145/78 (18 Nov 2019 05:49) (115/66 - 147/73)  BP(mean): --  RR: 18 (18 Nov 2019 05:49) (18 - 18)  SpO2: 96% (18 Nov 2019 05:49) (95% - 98%)  nc/at  s1s2  cta  soft, nt, nd no guarding or rebound  no c/c/e    CBC Full  -  ( 17 Nov 2019 09:21 )  WBC Count : 10.43 K/uL  RBC Count : 3.25 M/uL  Hemoglobin : 10.7 g/dL  Hematocrit : 34.3 %  Platelet Count - Automated : 420 K/uL  Mean Cell Volume : 105.5 fl  Mean Cell Hemoglobin : 32.9 pg  Mean Cell Hemoglobin Concentration : 31.2 gm/dL  Auto Neutrophil # : x  Auto Lymphocyte # : x  Auto Monocyte # : x  Auto Eosinophil # : x  Auto Basophil # : x  Auto Neutrophil % : x  Auto Lymphocyte % : x  Auto Monocyte % : x  Auto Eosinophil % : x  Auto Basophil % : x    11-18    137  |  103  |  18  ----------------------------<  112<H>  3.8   |  25  |  0.69    Ca    8.4      18 Nov 2019 06:30    TPro  5.6<L>  /  Alb  2.4<L>  /  TBili  1.0  /  DBili  x   /  AST  10  /  ALT  8<L>  /  AlkPhos  214<H>  11-18

## 2019-11-18 NOTE — DISCHARGE NOTE PROVIDER - HOSPITAL COURSE
89 f h/o GERD p/w mechanical fall and head trauma at home.    Pt found to have RT LE cellulitis Tx 9 days of IV Cefepime and to continue with PO Keflex for 2 more days at discharge, elevate legs, ace wrap to LE as tolerated. Abnormal U/A with positive culture finding, + leukocytosis, Pt asymptomatic, CT with concern for pyelo,     urine cx with klebsiella, very sensitive to Cefepime. Transaminitis resolved, US abdomen indicates normal biliary tree, seen by GI.        Stable for discharge to [facility] with F/U with PMD after discharge from Rehab.

## 2019-11-18 NOTE — DISCHARGE NOTE PROVIDER - PROVIDER TOKENS
FREE:[LAST:[Primary Care Physician],PHONE:[(   )    -],FAX:[(   )    -],ADDRESS:[Follow up after discharge from rehab],FOLLOWUP:[1 week]]

## 2019-11-18 NOTE — DISCHARGE NOTE NURSING/CASE MANAGEMENT/SOCIAL WORK - PATIENT PORTAL LINK FT
You can access the FollowMyHealth Patient Portal offered by [facility] by registering at the following website: [email or web address]. By joining [facility]’s FollowMyHealth portal, you will also be able to view your health information using other applications (apps) compatible with our system.

## 2019-11-18 NOTE — DISCHARGE NOTE PROVIDER - CARE PROVIDER_API CALL
Primary Care Physician,   Follow up after discharge from rehab  Phone: (   )    -  Fax: (   )    -  Follow Up Time: 1 week

## 2019-11-18 NOTE — PROGRESS NOTE ADULT - ASSESSMENT
89 f h/o GERD p/w mechanical fall and head trauma at home.  Pt found to have rt LE cellulitis, abnormal U/A with positive culture finding, transaminitis, leucocytosis.      Plan:   rt LE cellulitis:  Continue with cefazolin, at 1 gm iv q12h   day 4/10 of therapy today.   can switch to po when ready for discharge.   leg elevation  consider ace wrap to decrease swelling if pt tolerates.       abnormal U/A with positive culture finding,  Pt asymptomatic,   urine cx with klebsiella likely represents asymptomatic bacteriuria.        transaminitis:  stable.   U/S abdomen with normal biliary tree  GI following   work up for findings in pancreas per primary
89 f h/o GERD p/w mechanical fall and head trauma at home.  Pt found to have rt LE cellulitis, abnormal U/A with positive culture finding, transaminitis, leucocytosis.      Plan:   rt LE cellulitis:  Continue with cefazolin, at 1 gm iv q12h   leg elevation  consider ace wrap to decrease swelling if pt tolerates.       abnormal U/A with positive culture finding,  Pt asymptomatic,   urine cx with klebsiella likely represents asymptomatic bacteriuria.        transaminitis:  stable.   U/S abdomen with normal biliary tree  work up for findings in pancreas per primary        leucocytosis:  resolved.
89 f h/o GERD p/w mechanical fall and head trauma at home.  Pt found to have rt LE cellulitis, abnormal U/A with positive culture finding, transaminitis, leucocytosis.      Plan:   rt LE cellulitis:  Continue with cefazolin, at 1 gm iv q12h while inpt.   day 5/10 of therapy today.   can switch to po keflex 500 mg q8h when ready for discharge.   leg elevation  consider ace wrap to decrease swelling if pt tolerates.       abnormal U/A with positive culture finding,  Pt asymptomatic,   urine cx with klebsiella likely represents asymptomatic bacteriuria.        transaminitis:  improving   U/S abdomen with normal biliary tree  GI following   work up for findings in pancreas per primary
89 f h/o GERD p/w mechanical fall and head trauma at home.  Pt found to have rt LE cellulitis, abnormal U/A with positive culture finding, transaminitis, leucocytosis.      Plan:   rt LE cellulitis:  day 6/10 of therapy today.   Switched cefazolin to Cefepime 1 gm iv q12h   leg elevation  consider ace wrap to decrease swelling if pt tolerates.       abnormal U/A with positive culture finding:  Pt asymptomatic,   urine cx with klebsiella likely represents asymptomatic bacteriuria.        transaminitis:  improving but persists   U/S abdomen with normal biliary tree  GI following   Recommend CT abd/pelvis with contrast to ensure no occult source of infection or obstruction.
89 female with mechanical fall and head trauma.
89 yo old F with superifical RF wound    - Afebrile, no leukocytosis  - Sharp excisional debridement of hyperkeraotic lesions LF distal 2nd to level of epidermis and RF sub met 5 to level of subcutanous tissue. Wound with out signs of infection.   - B/L XR ordered - pending  - Continue local wound care with bacitracin and dry sterile dressing to LF  - No pod surgical intervention  - Pod Stable for discharge     follow up with Dr Fernanda Morel [phone] ( [place]) or [phone] ([place]) within 1 week of DC
awacody sono, cholestatic hepatitis, right heart vs meds, vs multifactoral.  no meds to d/c now.
conservative gi managmetn, continue ursodiol
continue boogie. follow bilirubin
discussed negative sono  on boogie, GGT elevated  follow lfts, no further GI interventions planned
follow lfts, continue urs, no other rx planed
ham hyman, mild elevation ap
no GI complaints, bili trending down on boogie  continue boogie  conservative management
sono results noted-normal other than pancreatic findings  conservative management  can start boogie for cholestasis, check GGT, follow lfts
89 f h/o GERD p/w mechanical fall and head trauma at home.  Pt found to have rt LE cellulitis, abnormal U/A with positive culture finding, transaminitis, leucocytosis.      Plan:   rt LE cellulitis:  day 8/10 of therapy today.   On Cefepime 1 gm iv q12h   leg elevation  consider ace wrap to decrease swelling if pt tolerates.   can switch to keflex 500 mg q8h on discharge, to complete therapy on 11/19/19.     abnormal U/A with positive culture finding:  Pt asymptomatic, CT with concern for pyelo   urine cx with klebsiella, very sensitive,   c/w cefepime     transaminitis:  Resolved   U/S abdomen with normal biliary tree  GI following

## 2019-11-20 NOTE — DIETITIAN INITIAL EVALUATION ADULT. - RD TO REMAIN AVAILABLE
[facility]    [address]    [facility] [address]    Phone:  [phone]       Thank You for choosing us for your health care visit. We are glad to serve you and happy to provide you with this summary of your visit. Please help us to ensure we have accurate records. If you find anything that needs to be changed, please let our staff know as soon as possible.          Your Demographic Information     Patient Name Sex     Shivani Rice Female 1986       Ethnic Group Patient Race    Not of  or  Origin Black/      Your Visit Details     Date & Time Provider Department    2017 4:45 PM MD [facility]      Your To Do List     Future Orders Please Complete On or Around Expires    URINALYSIS WITH MICRO EXAM W/O C/S  2017 May 05, 2017      Conditions Discussed Today or Order-Related Diagnoses        Comments    Urine frequency    -  Primary     Acute UTI           Your Vitals Were     BP Pulse Temp Resp Height Weight    143/88 (BP Location: [facility] [place], Patient Position: Sitting, Cuff Size: Regular) 78 98.1 °F (36.7 °C) (Oral) 18 5' 2\" (1.575 m) 215 lb (97.5 kg)    LMP SpO2 BMI Smoking Status          (LMP Unknown) 98% 39.32 kg/m2 Former Smoker        Medications Prescribed or Re-Ordered Today     nitrofurantoin, macrocrystal-monohydrate, (MACROBID) 100 MG capsule    Sig - Route: Take 1 capsule by mouth 2 times daily. - Oral    Class: Eprescribe    Pharmacy: [facility] [address] [place] [address] [address] AT [facility] Ph #: [phone]      Your Current Medications Are        Disp Refills Start End    loratadine (CLARITIN) 10 MG tablet 30 tablet 0 3/30/2017     Sig - Route: Take 1 tablet by mouth nightly. 0 - Oral    Class: Eprescribe    verapamil (CALAN SR, ISPOTIN SR) 240 MG CR tablet 90 tablet 0 2017     Sig - Route: Take 1 tablet by mouth nightly. - Oral    Class:  Eprescribe    nitrofurantoin, macrocrystal-monohydrate, (MACROBID) 100 MG capsule 14 capsule 0 2017     Sig - Route: Take 1 capsule by mouth 2 times daily. - Oral    Class: Eprescribe    azithromycin (ZITHROMAX Z-ADRIAN) 250 MG tablet () 6 tablet 0 3/30/2017 2017    Si tablets day one, then 1 tablet days 2-5    Class: Eprescribe      Discontinued Medications        Reason for Discontinue    methylPREDNISolone (MEDROL, ADRIAN,) 4 MG tablet Therapy Completed      Allergies     No Known Allergies      Immunizations History as of 2017     Name Date    DPT 1986    DTaP 1987, 1986    Hepatitis B Adolescent 2000, 10/25/1999, 1997, 1996, 1995    MMR 4/10/1990, 1987    Meningococcal Conjugate MCV4P (Menactra) 2006    PPD 10/9/2012 10:13 AM    Polio, ORAL 1993, 1987, 1986, 1986    Td:Adult type tetanus/diphtheria 2010, 2002, 1993    Tdap 2015      Problem List as of 2017     Anxiety state, unspecified    Depression    Tobacco abuse    Benign hypertension              Patient Instructions    BLADDER INFECTION, Female (Adult)    A bladder infection (\"cystitis\" or \"UTI\") usually causes a constant urge to urinate and a burning when passing urine. Urine may be cloudy, smelly or dark. There may be pain in the lower abdomen. A bladder infection occurs when bacteria from the vaginal area enter the bladder opening (urethra). This can occur from sexual intercourse, wearing tight clothing, dehydration and other factors.  HOME CARE:  · Drink lots of fluids (at least 6-8 glasses a day, unless you must restrict fluids for other medical reasons). This will force the medicine into your urinary system and flush the bacteria out of your body.  · Avoid sexual intercourse until your symptoms are gone.  · Avoid caffeine, alcohol and spicy foods. These can irritate the bladder.  · A bladder infection is treated with antibiotics. You may also be  given Pyridium (generic = phenazopyridine) to reduce the burning sensation. This medicine will cause your urine to become a bright orange color. The orange urine may stain clothing. You may wear a pad or panty-liner to protect clothing.  PREVENTING FUTURE INFECTIONS:  · Always wipe from front to back after a bowel movement.  · Keep the genital area clean and dry.  · Drink plenty of fluids each day to avoid dehydration.  · Both sexual partners should wash before intercourse.  · Urinate right after intercourse to flush out the bladder.  · Wear cotton underwear and cotton-lined panty hose; avoid tight-fitting pants.  · If you are on birth control pills and are having frequent bladder infections, discuss with your doctor.  FOLLOW UP: Return to this facility or see your doctor if ALL symptoms are not gone after three days of treatment.  GET PROMPT MEDICAL ATTENTION if any of the following occur:  · Fever of 100.4ºF (38ºC) or higher, or as directed by your healthcare provider  · No improvement by the third day of treatment  · Increasing back or abdominal pain  · Repeated vomiting; unable to keep medicine down  · Weakness, dizziness or fainting  · Vaginal discharge  · Pain, redness or swelling in the labia (outer vaginal area)  © 0332-6747 [employer] \[facility]\"", [address] [place] [address], [place] [address]. All rights reserved. This information is not intended as a substitute for professional medical care. Always follow your healthcare professional's instructions.          SURGERY yes

## 2019-12-05 ENCOUNTER — OUTPATIENT (OUTPATIENT)
Dept: OUTPATIENT SERVICES | Facility: HOSPITAL | Age: 84
LOS: 1 days | End: 2019-12-05

## 2019-12-05 ENCOUNTER — APPOINTMENT (OUTPATIENT)
Dept: CV DIAGNOSITCS | Facility: HOSPITAL | Age: 84
End: 2019-12-05
Payer: MEDICARE

## 2019-12-05 DIAGNOSIS — I10 ESSENTIAL (PRIMARY) HYPERTENSION: ICD-10-CM

## 2019-12-05 PROCEDURE — 93925 LOWER EXTREMITY STUDY: CPT | Mod: 26

## 2020-06-21 ENCOUNTER — EMERGENCY (EMERGENCY)
Facility: HOSPITAL | Age: 85
LOS: 1 days | Discharge: HOME | End: 2020-06-21
Attending: EMERGENCY MEDICINE
Payer: MEDICARE

## 2020-06-21 VITALS
SYSTOLIC BLOOD PRESSURE: 149 MMHG | TEMPERATURE: 98 F | DIASTOLIC BLOOD PRESSURE: 84 MMHG | HEART RATE: 74 BPM | OXYGEN SATURATION: 95 % | RESPIRATION RATE: 17 BRPM | HEIGHT: 60 IN | WEIGHT: 115.08 LBS

## 2020-06-21 VITALS
HEART RATE: 67 BPM | RESPIRATION RATE: 17 BRPM | SYSTOLIC BLOOD PRESSURE: 140 MMHG | DIASTOLIC BLOOD PRESSURE: 64 MMHG | OXYGEN SATURATION: 96 % | TEMPERATURE: 98 F

## 2020-06-21 LAB
APPEARANCE UR: CLEAR — SIGNIFICANT CHANGE UP
BACTERIA # UR AUTO: NEGATIVE — SIGNIFICANT CHANGE UP
BILIRUB UR-MCNC: NEGATIVE — SIGNIFICANT CHANGE UP
COLOR SPEC: YELLOW — SIGNIFICANT CHANGE UP
DIFF PNL FLD: NEGATIVE — SIGNIFICANT CHANGE UP
EPI CELLS # UR: 1 /HPF — SIGNIFICANT CHANGE UP
GLUCOSE UR QL: NEGATIVE — SIGNIFICANT CHANGE UP
HYALINE CASTS # UR AUTO: 2 /LPF — SIGNIFICANT CHANGE UP (ref 0–2)
KETONES UR-MCNC: NEGATIVE — SIGNIFICANT CHANGE UP
LEUKOCYTE ESTERASE UR-ACNC: NEGATIVE — SIGNIFICANT CHANGE UP
NITRITE UR-MCNC: NEGATIVE — SIGNIFICANT CHANGE UP
PH UR: 6.5 — SIGNIFICANT CHANGE UP (ref 5–8)
PROT UR-MCNC: NEGATIVE — SIGNIFICANT CHANGE UP
RBC CASTS # UR COMP ASSIST: 1 /HPF — SIGNIFICANT CHANGE UP (ref 0–4)
SP GR SPEC: 1.01 — SIGNIFICANT CHANGE UP (ref 1.01–1.02)
UROBILINOGEN FLD QL: NEGATIVE — SIGNIFICANT CHANGE UP
WBC UR QL: 1 /HPF — SIGNIFICANT CHANGE UP (ref 0–5)

## 2020-06-21 PROCEDURE — 87086 URINE CULTURE/COLONY COUNT: CPT

## 2020-06-21 PROCEDURE — 99283 EMERGENCY DEPT VISIT LOW MDM: CPT | Mod: GC

## 2020-06-21 PROCEDURE — 81001 URINALYSIS AUTO W/SCOPE: CPT

## 2020-06-21 PROCEDURE — 99283 EMERGENCY DEPT VISIT LOW MDM: CPT

## 2020-06-21 NOTE — ED PROVIDER NOTE - NSFOLLOWUPINSTRUCTIONS_ED_ALL_ED_FT
Your leg swelling and redness is likely related to the chronic buildup of water in your legs. Please take your home medications as prescribed. Please continue taking the cephalexin as prescribed. Please follow up with your primary care doctor within the next 48-72 hours for further evaluation and treatment.

## 2020-06-21 NOTE — ED PROVIDER NOTE - CLINICAL SUMMARY MEDICAL DECISION MAKING FREE TEXT BOX
Patient is well appearing, vss. b/l redness likely 2/2 inflammator change 2/2 lymphadema. not obviously infected, infact receding/improved from marker line. symmetric b/l with no calf tenderness. low suspicion for DVT. will have patient continue abx, discuss better compliance with torsemide/leg raise/compression stockings, close f/u with pmd.

## 2020-06-21 NOTE — ED PROVIDER NOTE - PROGRESS NOTE DETAILS
carmen pgy3: patient's daughter requesting that we check the patient's urine for UTI. States daughter has chronic burning with urination and they are due to have UA/UC this coming week but would prefer to have it done today. Patient states she has no urinary symptoms at this time, but will send urine during this visit and call with results. patient already on keflex - will have patient f/u with pmd with results. ambulating with assistance (baseline) will d/c

## 2020-06-21 NOTE — ED ADULT TRIAGE NOTE - CHIEF COMPLAINT QUOTE
pt c/o of non healing wounds to BLE.  pt denies any fever, chills, cough, or covid exposure at this time.

## 2020-06-21 NOTE — ED PROVIDER NOTE - OBJECTIVE STATEMENT
89F hx gerds, lymphedema on torsemide (noncompliant) p/w lower extremity wound. Per daughter Daniella, noticed patient had b/l lower extremity redness on wednesday, had telemed visit with the pmd and was prescribed 10d course of keflex, janneth a line around the rash and told to come to the ER if it doesn't look like its improving. Patient is otherwise at baseline per daughter and denies fevers, nausea/vomiting, weakness, no other complaints. Patient states legs are not painful and frequently forgets to take her torsemide.

## 2020-06-21 NOTE — ED PROVIDER NOTE - PATIENT PORTAL LINK FT
You can access the FollowMyHealth Patient Portal offered by [facility] by registering at the following website: [email or web address]. By joining [facility]’s FollowMyHealth portal, you will also be able to view your health information using other applications (apps) compatible with our system.

## 2020-06-21 NOTE — ED PROVIDER NOTE - ATTENDING CONTRIBUTION TO CARE
89F w/ GERD, lymphedema on torsemide (noncompliant) w/ chronic lower extremity wounds. Per daughter, Daniella noticed patient had B/L lower extremity redness on Wednesday, had telemed visit with PMd and was prescribed 10d course of Keflex, janneth a line around the rash and told to come to the ER if it was getting worse, has nto been getting worse and actually receding from the line, denies any pain. Frequently forgets to take her Torsemide. Daughter also wants patient's urine evaluated due to concern for UTI although patient denies any urinary symptoms.    PHYSICAL EXAMINATION:  VITALS REVIEWED.  VS slightly hypertensive, otherwise normal  GENERALIZED APPEARANCE: Comfortable appearing  SKIN:  Warm, dry, no cyanosis  HEAD:  No obvious scalp lesions  EYES:  Conjunctiva pink, no icterus  ENMT:  Mucus membranes moist, no stridor  NECK:  Supple, non-tender  CHEST AND RESPIRATORY:  Clear to auscultation B/L, good air entry B/L, equal chest expansion  HEART AND CARDIOVASCULAR:  Regular rate, no obvious murmur  ABDOMEN AND GI:  Soft, non-tender, non-distended.  No rebound, no guarding, no CVA-area tenderness  EXTREMITIES:  No deformity, edema, or calf tenderness; skin normal color for race, warm, dry and intact. b/l LE blanching erythema mid tibial receding from marker edge. no streaking. small well appearing superficial ulcer without discharge on R lat mal. no crepitus. mild induration. No lymphadenopathy. Equal b/l with mild pitting  NEURO: AAOx3, gross motor and sensory intact    Impression:  Chronic lower extremity wounds and erythema appears to be resolving; will check UA, patient at baseline status; chart reviewed chronic lymphedema with negative DVT US prior. Discussed compliance with Torsemide.

## 2020-06-21 NOTE — ED ADULT NURSE NOTE - OBJECTIVE STATEMENT
89 yo F aaox4 C/o of "wound not healing" upon examination pt noted to have redness on B/L shins. Pt denies any pain. Skin is warm to touch. No sign of acute distress. Provider at bedside evaluating. Pt afebrile in ED. Safety and support provided.

## 2020-06-21 NOTE — ED ADULT NURSE NOTE - CHPI ED NUR SYMPTOMS NEG
no blood in mucus/no fever/no purulent drainage/no rectal pain/no bleeding at site/no drainage/no pain/no vomiting/no chills

## 2020-06-21 NOTE — ED PROVIDER NOTE - SKIN, MLM
Skin normal color for race, warm, dry and intact. b/l LE erythema mid tibial receding from marker edge. no streaking. no crepitus. mild induration. No lymphadenopathy. Equal b/l with mild pitting Skin normal color for race, warm, dry and intact. b/l LE blanching erythema mid tibial receding from marker edge. no streaking. small well appearing superficial ulcer without discharge on R lat mal. no crepitus. mild induration. No lymphadenopathy. Equal b/l with mild pitting

## 2020-06-22 LAB
CULTURE RESULTS: SIGNIFICANT CHANGE UP
SPECIMEN SOURCE: SIGNIFICANT CHANGE UP

## 2020-12-25 ENCOUNTER — INPATIENT (INPATIENT)
Facility: HOSPITAL | Age: 85
LOS: 5 days | Discharge: HOME | DRG: 563 | End: 2020-12-31
Attending: INTERNAL MEDICINE | Admitting: INTERNAL MEDICINE
Payer: MEDICARE

## 2020-12-25 VITALS
TEMPERATURE: 98 F | SYSTOLIC BLOOD PRESSURE: 156 MMHG | WEIGHT: 149.91 LBS | RESPIRATION RATE: 16 BRPM | OXYGEN SATURATION: 96 % | HEART RATE: 81 BPM | DIASTOLIC BLOOD PRESSURE: 100 MMHG | HEIGHT: 60 IN

## 2020-12-25 DIAGNOSIS — S69.90XA UNSPECIFIED INJURY OF UNSPECIFIED WRIST, HAND AND FINGER(S), INITIAL ENCOUNTER: ICD-10-CM

## 2020-12-25 DIAGNOSIS — K21.9 GASTRO-ESOPHAGEAL REFLUX DISEASE WITHOUT ESOPHAGITIS: ICD-10-CM

## 2020-12-25 LAB
ALBUMIN SERPL ELPH-MCNC: 3.7 G/DL — SIGNIFICANT CHANGE UP (ref 3.3–5)
ALP SERPL-CCNC: 106 U/L — SIGNIFICANT CHANGE UP (ref 40–120)
ALT FLD-CCNC: 21 U/L — SIGNIFICANT CHANGE UP (ref 10–45)
ANION GAP SERPL CALC-SCNC: 12 MMOL/L — SIGNIFICANT CHANGE UP (ref 5–17)
APTT BLD: 28 SEC — SIGNIFICANT CHANGE UP (ref 27.5–35.5)
AST SERPL-CCNC: 23 U/L — SIGNIFICANT CHANGE UP (ref 10–40)
BASOPHILS # BLD AUTO: 0.02 K/UL — SIGNIFICANT CHANGE UP (ref 0–0.2)
BASOPHILS NFR BLD AUTO: 0.3 % — SIGNIFICANT CHANGE UP (ref 0–2)
BILIRUB SERPL-MCNC: 0.7 MG/DL — SIGNIFICANT CHANGE UP (ref 0.2–1.2)
BUN SERPL-MCNC: 36 MG/DL — HIGH (ref 7–23)
CALCIUM SERPL-MCNC: 9.8 MG/DL — SIGNIFICANT CHANGE UP (ref 8.4–10.5)
CHLORIDE SERPL-SCNC: 107 MMOL/L — SIGNIFICANT CHANGE UP (ref 96–108)
CO2 SERPL-SCNC: 21 MMOL/L — LOW (ref 22–31)
CREAT SERPL-MCNC: 0.99 MG/DL — SIGNIFICANT CHANGE UP (ref 0.5–1.3)
EOSINOPHIL # BLD AUTO: 0.07 K/UL — SIGNIFICANT CHANGE UP (ref 0–0.5)
EOSINOPHIL NFR BLD AUTO: 1.2 % — SIGNIFICANT CHANGE UP (ref 0–6)
GLUCOSE SERPL-MCNC: 126 MG/DL — HIGH (ref 70–99)
HCT VFR BLD CALC: 41.2 % — SIGNIFICANT CHANGE UP (ref 34.5–45)
HGB BLD-MCNC: 13.2 G/DL — SIGNIFICANT CHANGE UP (ref 11.5–15.5)
IMM GRANULOCYTES NFR BLD AUTO: 0.3 % — SIGNIFICANT CHANGE UP (ref 0–1.5)
INR BLD: 0.95 RATIO — SIGNIFICANT CHANGE UP (ref 0.88–1.16)
LYMPHOCYTES # BLD AUTO: 0.73 K/UL — LOW (ref 1–3.3)
LYMPHOCYTES # BLD AUTO: 12.2 % — LOW (ref 13–44)
MAGNESIUM SERPL-MCNC: 2 MG/DL — SIGNIFICANT CHANGE UP (ref 1.6–2.6)
MCHC RBC-ENTMCNC: 31.5 PG — SIGNIFICANT CHANGE UP (ref 27–34)
MCHC RBC-ENTMCNC: 32 GM/DL — SIGNIFICANT CHANGE UP (ref 32–36)
MCV RBC AUTO: 98.3 FL — SIGNIFICANT CHANGE UP (ref 80–100)
MONOCYTES # BLD AUTO: 0.45 K/UL — SIGNIFICANT CHANGE UP (ref 0–0.9)
MONOCYTES NFR BLD AUTO: 7.5 % — SIGNIFICANT CHANGE UP (ref 2–14)
NEUTROPHILS # BLD AUTO: 4.68 K/UL — SIGNIFICANT CHANGE UP (ref 1.8–7.4)
NEUTROPHILS NFR BLD AUTO: 78.5 % — HIGH (ref 43–77)
NRBC # BLD: 0 /100 WBCS — SIGNIFICANT CHANGE UP (ref 0–0)
PHOSPHATE SERPL-MCNC: 3.4 MG/DL — SIGNIFICANT CHANGE UP (ref 2.5–4.5)
PLATELET # BLD AUTO: 232 K/UL — SIGNIFICANT CHANGE UP (ref 150–400)
POTASSIUM SERPL-MCNC: 4.4 MMOL/L — SIGNIFICANT CHANGE UP (ref 3.5–5.3)
POTASSIUM SERPL-SCNC: 4.4 MMOL/L — SIGNIFICANT CHANGE UP (ref 3.5–5.3)
PROT SERPL-MCNC: 6.2 G/DL — SIGNIFICANT CHANGE UP (ref 6–8.3)
PROTHROM AB SERPL-ACNC: 11.4 SEC — SIGNIFICANT CHANGE UP (ref 10.6–13.6)
RBC # BLD: 4.19 M/UL — SIGNIFICANT CHANGE UP (ref 3.8–5.2)
RBC # FLD: 14.6 % — HIGH (ref 10.3–14.5)
SARS-COV-2 RNA SPEC QL NAA+PROBE: SIGNIFICANT CHANGE UP
SODIUM SERPL-SCNC: 140 MMOL/L — SIGNIFICANT CHANGE UP (ref 135–145)
TROPONIN T, HIGH SENSITIVITY RESULT: 40 NG/L — SIGNIFICANT CHANGE UP (ref 0–51)
TROPONIN T, HIGH SENSITIVITY RESULT: 41 NG/L — SIGNIFICANT CHANGE UP (ref 0–51)
WBC # BLD: 5.97 K/UL — SIGNIFICANT CHANGE UP (ref 3.8–10.5)
WBC # FLD AUTO: 5.97 K/UL — SIGNIFICANT CHANGE UP (ref 3.8–10.5)

## 2020-12-25 PROCEDURE — 72170 X-RAY EXAM OF PELVIS: CPT | Mod: 26

## 2020-12-25 PROCEDURE — 29125 APPL SHORT ARM SPLINT STATIC: CPT | Mod: GC

## 2020-12-25 PROCEDURE — 71045 X-RAY EXAM CHEST 1 VIEW: CPT | Mod: 26

## 2020-12-25 PROCEDURE — 70450 CT HEAD/BRAIN W/O DYE: CPT | Mod: 26

## 2020-12-25 PROCEDURE — 99285 EMERGENCY DEPT VISIT HI MDM: CPT | Mod: 25,GC

## 2020-12-25 PROCEDURE — 73130 X-RAY EXAM OF HAND: CPT | Mod: 26,LT,77

## 2020-12-25 PROCEDURE — 73130 X-RAY EXAM OF HAND: CPT | Mod: 26,LT

## 2020-12-25 RX ORDER — LIDOCAINE HCL 20 MG/ML
10 VIAL (ML) INJECTION ONCE
Refills: 0 | Status: DISCONTINUED | OUTPATIENT
Start: 2020-12-25 | End: 2020-12-31

## 2020-12-25 RX ORDER — INFLUENZA VIRUS VACCINE 15; 15; 15; 15 UG/.5ML; UG/.5ML; UG/.5ML; UG/.5ML
0.5 SUSPENSION INTRAMUSCULAR ONCE
Refills: 0 | Status: DISCONTINUED | OUTPATIENT
Start: 2020-12-25 | End: 2020-12-25

## 2020-12-25 RX ORDER — OMEPRAZOLE 10 MG/1
1 CAPSULE, DELAYED RELEASE ORAL
Qty: 0 | Refills: 0 | DISCHARGE

## 2020-12-25 RX ORDER — CEPHALEXIN 500 MG
1 CAPSULE ORAL
Qty: 0 | Refills: 0 | DISCHARGE

## 2020-12-25 NOTE — ED PROVIDER NOTE - NS ED ROS FT
CONST: no fevers, no chills  ENT: no sore throat  CV: no chest pain, +chronic leg swelling  RESP: no shortness of breath, no cough  ABD: no abdominal pain, no nausea, no vomiting, no diarrhea  : no dysuria, no flank pain, no hematuria  MSK: no back pain, +extremity pain  NEURO: no headache or additional neurologic complaints  HEME: no easy bleeding  SKIN:  no rash

## 2020-12-25 NOTE — H&P ADULT - HISTORY OF PRESENT ILLNESS
89 F with pmhx gerd, chronic lymphedema, asthma, HLD presenting for finger deformity s/p fall.    89 F with pmhx gerd, chronic lymphedema, asthma, HLD presenting for finger deformity s/p fall.   Patient very confused, anxious to go home, reports pain in her Left 4 th finger. .     CT head shows no acute changes

## 2020-12-25 NOTE — ED PROVIDER NOTE - PROGRESS NOTE DETAILS
pt sign out to check ct scan and xrays of fingers ,pt got fr and dislocation  l ring finger on xray ,case discussed with dr Isadora day ,ask to  reduce the dislocation   ,splint and he will follow up in the office ZR

## 2020-12-25 NOTE — ED PROVIDER NOTE - PHYSICAL EXAMINATION
Physical Exam:  Gen: NAD, non-toxic appearing, awake alert   Head: NCAT  HEENT: normal conjunctiva  Lung: CTAB, no respiratory distress, no wheezes/rhonchi/rales B/L, speaking in full sentences  CV: RRR, no murmurs, rubs or gallops  Abd: soft, NT, ND, no guarding, no rigidity, no rebound tenderness, no CVA tenderness   MSK: swelling noted to L ring finger with obvious deformity at PIP joint, ring in place over finger, normal cap refill for L ring finger, able to move finger minimally, no spinal tenderness   Neuro: No focal sensory or motor deficits  Skin: Warm, well perfused, no rash, no leg swelling  Psych: normal affect, calm  ~Alejandro Miller MD (PGY-1)

## 2020-12-25 NOTE — ED PROVIDER NOTE - CLINICAL SUMMARY MEDICAL DECISION MAKING FREE TEXT BOX
89F presenting for finger injury s/p fall  ECG changes present  Plan: labs, trops, ecg, finger reduction, reassess

## 2020-12-25 NOTE — ED PROVIDER NOTE - OBJECTIVE STATEMENT
89F PMH GERD, chronic lymphedema not compliant with her torsemide, 89F PMH GERD, chronic lymphedema, asthma, HLD not compliant with her torsemide presenting for finger deformity s/p fall. Pt A&Ox3 here but denies falling. Spoke to pt's daughter who states pt had unwitnessed fall; frequently has falls b/c she is not compliant with her walker. Daughter states pt has also been complaining of some dysuria w/o hematuria. No fevers/chills, chest pain, SOB, abdominal pain

## 2020-12-25 NOTE — H&P ADULT - PROBLEM SELECTOR PLAN 1
Left 4 th finger fracture s/p fall -status post reduction of the left fourth proximal phalanx fracture with improved alignment and mild residual volar angulation of the fracture apex.

## 2020-12-25 NOTE — ED ADULT NURSE NOTE - OBJECTIVE STATEMENT
89 y/o F A&Ox2 (oriented to self and place) PMH glaucoma, spinal stenosis, osteoarthritis, asthma, MVP & PSH hip replacement, cataracts presents to the ED via EMS c/o hand injury. Pt is poor historian. According to pt daughter, pt did not use her walker when ambulating to the bathroom and fell. Daughter states pt has history of frequent falls. Pt states she did not fall but is not sure how she injured her hand. Upon arrival to the ED pt is well appearing. Breathing is even and unlabored, satting high 90s RA. Skin is warm, dry & in tact. L ring finger is grossly deformed. Pt is able to move finger, has sensation throughout finger and cap refill <3 seconds. Pt states she did not hit her head and has no other injuries. Denies CP, SOB, dizziness, HA, N/V/D, fevers, chills, cough. Call bell within reach, comfort & safety provided.

## 2020-12-25 NOTE — ED ADULT NURSE NOTE - NS_SISCREENINGSR_GEN_ALL_ED
Detail Level: Detailed Depth Of Biopsy: dermis Was A Bandage Applied: Yes Size Of Lesion In Cm: 0 Biopsy Type: H and E Biopsy Method: Dermablade Anesthesia Type: 1% lidocaine without epinephrine Anesthesia Volume In Cc (Will Not Render If 0): 0.8 Hemostasis: Aluminum Chloride Wound Care: Bacitracin Dressing: bandage Destruction After The Procedure: No Type Of Destruction Used: Curettage Curettage Text: The wound bed was treated with curettage after the biopsy was performed. Cryotherapy Text: The wound bed was treated with cryotherapy after the biopsy was performed. Electrodesiccation Text: The wound bed was treated with electrodesiccation after the biopsy was performed. Electrodesiccation And Curettage Text: The wound bed was treated with electrodesiccation and curettage after the biopsy was performed. Silver Nitrate Text: The wound bed was treated with silver nitrate after the biopsy was performed. Lab: 6 Lab Facility: 3 Consent: Written consent was obtained and risks were reviewed including but not limited to scarring, infection, bleeding, scabbing, incomplete removal, nerve damage and allergy to anesthesia. Post-Care Instructions: I reviewed with the patient in detail post-care instructions. Patient is to keep the biopsy site dry overnight, and then apply bacitracin twice daily until healed. Patient may apply hydrogen peroxide soaks to remove any crusting. Notification Instructions: Patient will be notified of biopsy results. However, patient instructed to call the office if not contacted within 2 weeks. Billing Type: Third-Party Bill Negative Information: Selecting Yes will display possible errors in your note based on the variables you have selected. This validation is only offered as a suggestion for you. PLEASE NOTE THAT THE VALIDATION TEXT WILL BE REMOVED WHEN YOU FINALIZE YOUR NOTE. IF YOU WANT TO FAX A PRELIMINARY NOTE YOU WILL NEED TO TOGGLE THIS TO 'NO' IF YOU DO NOT WANT IT IN YOUR FAXED NOTE.

## 2020-12-25 NOTE — ED PROVIDER NOTE - ATTENDING CONTRIBUTION TO CARE
89F, pmh gerd, lymphedema, hld, presents with L 4th finger pain/deformity s/p fall. Pt with unwitnessed fall per daughter, pt has frequent falls at home. Daughter also reports pt complaining of dysuria. Pt denies any complaints.    PE: NAD, NCAT, MMM, Trachea midline, Normal conjunctiva, lungs CTAB, S1/S2 RRR, Normal perfusion, 2+ radial pulses bilat, Abdomen Soft, NTND, No rebound/guarding, No LE edema, No deformity of extremities, No rashes. FROM bilat UE and LE without bony or joint ttp with exception +Deformity to L 4th finger, limited ROM 2/2 pain, cap refill <2 sec, sensation intact. No midline C, T, L ttp.    L 4th finger deformity c/w fracture. Unwitnessed fall. Imaging. Labs. EKG. Melquiades Jasso MD

## 2020-12-26 LAB
APPEARANCE UR: ABNORMAL
BACTERIA # UR AUTO: ABNORMAL
BILIRUB UR-MCNC: NEGATIVE — SIGNIFICANT CHANGE UP
COLOR SPEC: YELLOW — SIGNIFICANT CHANGE UP
DIFF PNL FLD: ABNORMAL
EPI CELLS # UR: 0 /HPF — SIGNIFICANT CHANGE UP
GLUCOSE UR QL: NEGATIVE — SIGNIFICANT CHANGE UP
HYALINE CASTS # UR AUTO: 0 /LPF — SIGNIFICANT CHANGE UP (ref 0–2)
KETONES UR-MCNC: SIGNIFICANT CHANGE UP
LEUKOCYTE ESTERASE UR-ACNC: ABNORMAL
NITRITE UR-MCNC: NEGATIVE — SIGNIFICANT CHANGE UP
PH UR: 7.5 — SIGNIFICANT CHANGE UP (ref 5–8)
PROT UR-MCNC: ABNORMAL
RBC CASTS # UR COMP ASSIST: 30 /HPF — HIGH (ref 0–4)
SP GR SPEC: 1.02 — SIGNIFICANT CHANGE UP (ref 1.01–1.02)
UROBILINOGEN FLD QL: NEGATIVE — SIGNIFICANT CHANGE UP
WBC UR QL: 19 /HPF — HIGH (ref 0–5)

## 2020-12-26 RX ORDER — POLYETHYLENE GLYCOL 3350 17 G/17G
17 POWDER, FOR SOLUTION ORAL DAILY
Refills: 0 | Status: DISCONTINUED | OUTPATIENT
Start: 2020-12-26 | End: 2020-12-31

## 2020-12-26 RX ORDER — SENNA PLUS 8.6 MG/1
1 TABLET ORAL DAILY
Refills: 0 | Status: DISCONTINUED | OUTPATIENT
Start: 2020-12-26 | End: 2020-12-31

## 2020-12-26 RX ORDER — DILTIAZEM HCL 120 MG
30 CAPSULE, EXT RELEASE 24 HR ORAL
Refills: 0 | Status: DISCONTINUED | OUTPATIENT
Start: 2020-12-26 | End: 2020-12-28

## 2020-12-26 RX ADMIN — Medication 30 MILLIGRAM(S): at 17:08

## 2020-12-26 RX ADMIN — POLYETHYLENE GLYCOL 3350 17 GRAM(S): 17 POWDER, FOR SOLUTION ORAL at 15:50

## 2020-12-26 RX ADMIN — SENNA PLUS 1 TABLET(S): 8.6 TABLET ORAL at 13:25

## 2020-12-26 NOTE — CONSULT NOTE ADULT - SUBJECTIVE AND OBJECTIVE BOX
[facility]        Patient is a 89y old  Female who presents with a chief complaint of s/p fall today (26 Dec 2020 08:09)    Excerpt from H&P,"    89 F with pmhx gerd, chronic lymphedema, asthma, HLD presenting for finger deformity s/p fall.   Patient very confused, anxious to go home, reports pain in her Left 4 th finger. .     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   MEDICATIONS  (STANDING):  diltiazem    Tablet 30 milliGRAM(s) Oral two times a day  lidocaine 2% (Preservative-free) Injectable 10 milliLiter(s) Local Injection Once    MEDICATIONS  (PRN):           *****REVIEW OF SYSTEM:  GEN: no fever, no chills, no pain  RESP: no SOB, no cough, no sputum  CVS: no chest pain, no palpitations, no edema  GI: no abdominal pain, no nausea, no vomiting, no constipation, no diarrhea  : no dysurea, no frequency, no hematurea  Neuro: no headache, no dizziness  PSYCH: no anxiety, no depression  Derm : no itching, no rash         *****VITAL SIGNS:  T(C): 36.4 (20 @ 11:39), Max: 37.1 (20 @ 22:30)  HR: 56 (20 @ 11:39) (56 - 82)  BP: 151/72 (20 @ 11:39) (143/90 - 156/100)  RR: 18 (20 @ 11:39) (16 - 18)  SpO2: 95% (12-26-20 @ 11:39) (94% - 100%)  Wt(kg): --     @ 07:01  -   @ 12:02  --------------------------------------------------------  IN: 200 mL / OUT: 375 mL / NET: -175 mL             *****PHYSICAL EXAM:   Alert oriented x2   Attention comprehension are limited  Able to name, repeat,   without any difficulty.   Able to follow  1 step commands.     EOMI fundi not visualized,  counts finger but somewhat inconsistent     No facial asymmetry   Tongue is midline   Palate elevates symmetrically   Moving all 4 ext symmetrically   limited mvt of the Rue above 90 degrees     Reflexes are diminished  throughout   sensation is grossly symmetric  Gait : not assessed.  B/L down going toes               *****LAB AND IMAGIN.2   5.97  )-----------( 232      ( 25 Dec 2020 17:07 )             41.2                   140  |  107  |  36<H>  ----------------------------<  126<H>  4.4   |  21<L>  |  0.99    Ca    9.8      25 Dec 2020 17:07  Phos  3.4       Mg     2.0         TPro  6.2  /  Alb  3.7  /  TBili  0.7  /  DBili  x   /  AST  23  /  ALT  21  /  AlkPhos  106      PT/INR - ( 25 Dec 2020 17:07 )   PT: 11.4 sec;   INR: 0.95 ratio         PTT - ( 25 Dec 2020 17:07 )  PTT:28.0 sec                        Urinalysis Basic - ( 26 Dec 2020 06:51 )    Color: Yellow / Appearance: Slightly Turbid / S.023 / pH: x  Gluc: x / Ketone: Trace  / Bili: Negative / Urobili: Negative   Blood: x / Protein: 30 mg/dL / Nitrite: Negative   Leuk Esterase: Large / RBC: 30 /hpf / WBC 19 /HPF   Sq Epi: x / Non Sq Epi: 0 /hpf / Bacteria: Few        [All pertinent recent Imaging reports reviewed]< from: CT Head No Cont (20 @ 18:13) >  Multiple axial sections were performed from base of skull to vertex without contrast enhancement. Coronal and sagittalreconstructions were performed.    This exam is compared prior noncontrast head CT performed on 2019    Parenchymal volume loss and chronic microvascular ischemic changes are identified    There is no evidence of acute hemorrhage mass or mass effect seen.    Evaluation of the osseous structures with the appropriate window appears unremarkable    The visualized paranasal sinuses mastoid and middle ear regions appear clear.    The patient is status post bilateral cataract surgery.    IMPRESSION: No significant change when allowing for differences in technique.      < end of copied text >           *****A S S E S S M E N T   A N D   P L A N :     89 F with pmhx gerd, chronic lymphedema, asthma, HLD presenting for finger deformity s/p fall.   Patient very confused, anxious to go home, reports pain in her Left 4 th finger. .          Problem/Recommendations 1: recurrent falls of unclear etiology   deconditioning vs orthostatic   pt uses walker at baseline, but somewhat non compliant with it as per chart   ? over diuresis as pt is prerenal   b12/folate/tsh   pt eval         Problem/Recommendations 2:  R shoulder with limited mvt ? related to fall   would get xray  of  R shoulder        ___________________________  Will follow with you.  Thank you,  Linda James MD  Diplomate of the American Board of Neurology and Psychiatry.  Diplomate of the American Board of Vascular Neurology.   [facility] ([facility]), [facility]   Ph: [phone]    Differential diagnosis and plan of care discussed with patient after the evaluation.   Advanced care planning options discussed.   Pain assessed and judicious use of narcotics when appropriate was discussed.  Importance of Fall prevention discussed.  Counseling on Smoking and Alcohol cessation was offered when appropriate.  Counseling on Diet, exercise, and medication compliance was done.   83 minutes spent on the total encounter;  more than 50 % of the visit was spent on counseling  and or coordinating care by the attending physician.    Thank you for allowing me to participate in the care of this lucero patient. Please do not hesitate to call me if you have any questions.     This and subsequent notes were partially created using voice recognition software and will  inherently be subject to errors including those of syntax and sound alike substitutions which may escape proofreading. In such instances original meaning may be extrapolated by contextual derivation.   
CHIEF COMPLAINT:Patient is a 89y old  Female who presents with a chief complaint of s/p fall today (25 Dec 2020 22:30)      HISTORY OF PRESENT ILLNESS:    89 female with history as below GERD, chronic lymphedema, asthma HLD  admitted s/p fall   pt is is mildly confused ROS is limited  not sure if pt had syncope   admitted to [facility] for monitoring     PAST MEDICAL & SURGICAL HISTORY:  Prolapse of Vaginal Wall    Urinary Frequency    Glaucoma    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    History of Spinal Stenosis    H/O: Osteoarthritis    Hernia, Hiatal    Asthma    MVP (Mitral Valve Prolapse)    Functional Heart Murmur    HLD (Hyperlipidemia)    H/O: Hypertension    S/P Colonoscopy    Bilateral Cataracts removed 2010    S/P Hip Replacement  2002            MEDICATIONS:                  FAMILY HISTORY:      Non-contributory    SOCIAL HISTORY:    No tobacco, drugs or etoh    Allergies    sulfa drugs (Hives)    Intolerances    	    REVIEW OF SYSTEMS:  as above  The rest of the 14 points ROS reviewed and except above they are unremarkable.        PHYSICAL EXAM:  T(C): 36.4 (12-26-20 @ 05:07), Max: 37.1 (12-25-20 @ 22:30)  HR: 60 (12-26-20 @ 05:07) (60 - 82)  BP: 147/74 (12-26-20 @ 05:07) (143/90 - 156/100)  RR: 18 (12-26-20 @ 05:07) (16 - 18)  SpO2: 94% (12-26-20 @ 05:07) (94% - 100%)  Wt(kg): --  I&O's Summary      JVP: Normal  Neck: supple  Lung: clear   CV: S1 S2 , Murmur: pos felix   Abd: soft  Ext: pos edema  neuro: Awake / alert  Psych: flat affect  Skin: normal      LABS/DATA:    TELEMETRY: 	  sinus, ventricular trigeminy   ECG:  	   	  CARDIAC MARKERS:                        41 <<== 12-25-20 @ 18:00                40 <<== 12-25-20 @ 17:07                              13.2   5.97  )-----------( 232      ( 25 Dec 2020 17:07 )             41.2     12-25    140  |  107  |  36<H>  ----------------------------<  126<H>  4.4   |  21<L>  |  0.99    Ca    9.8      25 Dec 2020 17:07  Phos  3.4     12-25  Mg     2.0     12-25    TPro  6.2  /  Alb  3.7  /  TBili  0.7  /  DBili  x   /  AST  23  /  ALT  21  /  AlkPhos  106  12-25    proBNP:   Lipid Profile:   HgA1c:   TSH:

## 2020-12-26 NOTE — CONSULT NOTE ADULT - ASSESSMENT
S/p Fall  rule out syncope  monitor on tele  Echo   consider carotid doppler   neurology eval   check Orthostatic vitals    Ventricular trigeminy   appears to be outflow tract focus  will start low dose CCB   obtain echo  obtain TSH     HTN  plan as above    Murmur  echo

## 2020-12-27 LAB
ANION GAP SERPL CALC-SCNC: 13 MMOL/L — SIGNIFICANT CHANGE UP (ref 5–17)
BUN SERPL-MCNC: 26 MG/DL — HIGH (ref 7–23)
CALCIUM SERPL-MCNC: 9.1 MG/DL — SIGNIFICANT CHANGE UP (ref 8.4–10.5)
CHLORIDE SERPL-SCNC: 106 MMOL/L — SIGNIFICANT CHANGE UP (ref 96–108)
CO2 SERPL-SCNC: 20 MMOL/L — LOW (ref 22–31)
CREAT SERPL-MCNC: 0.97 MG/DL — SIGNIFICANT CHANGE UP (ref 0.5–1.3)
FOLATE SERPL-MCNC: 7.8 NG/ML — SIGNIFICANT CHANGE UP
GLUCOSE SERPL-MCNC: 103 MG/DL — HIGH (ref 70–99)
HCT VFR BLD CALC: 40.6 % — SIGNIFICANT CHANGE UP (ref 34.5–45)
HGB BLD-MCNC: 12.8 G/DL — SIGNIFICANT CHANGE UP (ref 11.5–15.5)
MAGNESIUM SERPL-MCNC: 1.9 MG/DL — SIGNIFICANT CHANGE UP (ref 1.6–2.6)
MCHC RBC-ENTMCNC: 30.7 PG — SIGNIFICANT CHANGE UP (ref 27–34)
MCHC RBC-ENTMCNC: 31.5 GM/DL — LOW (ref 32–36)
MCV RBC AUTO: 97.4 FL — SIGNIFICANT CHANGE UP (ref 80–100)
NRBC # BLD: 0 /100 WBCS — SIGNIFICANT CHANGE UP (ref 0–0)
PLATELET # BLD AUTO: 236 K/UL — SIGNIFICANT CHANGE UP (ref 150–400)
POTASSIUM SERPL-MCNC: 4.5 MMOL/L — SIGNIFICANT CHANGE UP (ref 3.5–5.3)
POTASSIUM SERPL-SCNC: 4.5 MMOL/L — SIGNIFICANT CHANGE UP (ref 3.5–5.3)
RBC # BLD: 4.17 M/UL — SIGNIFICANT CHANGE UP (ref 3.8–5.2)
RBC # FLD: 14.5 % — SIGNIFICANT CHANGE UP (ref 10.3–14.5)
SARS-COV-2 IGG SERPL QL IA: NEGATIVE — SIGNIFICANT CHANGE UP
SARS-COV-2 IGM SERPL IA-ACNC: <0.1 INDEX — SIGNIFICANT CHANGE UP
SODIUM SERPL-SCNC: 139 MMOL/L — SIGNIFICANT CHANGE UP (ref 135–145)
TSH SERPL-MCNC: 2.14 UIU/ML — SIGNIFICANT CHANGE UP (ref 0.27–4.2)
VIT B12 SERPL-MCNC: 734 PG/ML — SIGNIFICANT CHANGE UP (ref 232–1245)
WBC # BLD: 6.76 K/UL — SIGNIFICANT CHANGE UP (ref 3.8–10.5)
WBC # FLD AUTO: 6.76 K/UL — SIGNIFICANT CHANGE UP (ref 3.8–10.5)

## 2020-12-27 RX ADMIN — Medication 30 MILLIGRAM(S): at 05:06

## 2020-12-27 RX ADMIN — SENNA PLUS 1 TABLET(S): 8.6 TABLET ORAL at 11:29

## 2020-12-27 RX ADMIN — Medication 30 MILLIGRAM(S): at 21:35

## 2020-12-27 NOTE — PROVIDER CONTACT NOTE (OTHER) - ASSESSMENT
Pt is A&Ox2-3, baseline. Pt denies CP, SOB, palpitations. VSS (see flowsheet). Pt is A&Ox2-3, baseline. Pt denies CP, SOB, palpitations. VSS (see flowsheet). Pt back in sinus rhythm 60s on cardiac monitor.

## 2020-12-27 NOTE — PROVIDER CONTACT NOTE (OTHER) - BACKGROUND
89F PMH GERD, chronic Lymphedema, Asthma, HLD not compliant with her torsemide ---Presenting for finger deformity s/p Fall w/ Left Finger #4 Fx - in volar splint. Pt is a 89F PMH GERD, chronic Lymphedema, Asthma, HLD not compliant with her torsemide ---Presenting for finger deformity s/p Fall w/ Left Finger #4 Fx - in volar splint.

## 2020-12-27 NOTE — PROVIDER CONTACT NOTE (MEDICATION) - ASSESSMENT
patient A&Ox2. multiple attempts made to have patient take medicine, patient spitting it out. VSS. HR 77

## 2020-12-27 NOTE — CHART NOTE - NSCHARTNOTEFT_GEN_A_CORE
Medicine NP note    CC: Unsustained PAT  PAT with Hr in 140's for 3.3 seconds  Patient asymptomatic  Hd stable    Vital Signs Last 24 Hrs  T(C): 37.1 (26 Dec 2020 20:43), Max: 37.1 (26 Dec 2020 20:43)  T(F): 98.7 (26 Dec 2020 20:43), Max: 98.7 (26 Dec 2020 20:43)  HR: 68 (27 Dec 2020 04:20) (56 - 100)  BP: 135/80 (27 Dec 2020 04:20) (135/80 - 164/90)  BP(mean): --  RR: 20 (26 Dec 2020 20:43) (18 - 20)  SpO2: 95% (26 Dec 2020 20:43) (94% - 95%)      89F PMH GERD, chronic Lymphedema, Asthma, HLD not compliant with her torsemide ---Presenting for finger deformity s/p fall.   s/p Fall w/ Left Finger #4 Fx - in volar splint  Now with unsustained PAT  Asymptomatic  F/U am electrolytes  Supplement electrolytes as needed  pain control  Will continue to monitor    Randa Yusuf P BC  [id number]

## 2020-12-28 LAB
-  AMIKACIN: SIGNIFICANT CHANGE UP
-  AMOXICILLIN/CLAVULANIC ACID: SIGNIFICANT CHANGE UP
-  AMPICILLIN/SULBACTAM: SIGNIFICANT CHANGE UP
-  AMPICILLIN: SIGNIFICANT CHANGE UP
-  AZTREONAM: SIGNIFICANT CHANGE UP
-  CEFAZOLIN: SIGNIFICANT CHANGE UP
-  CEFEPIME: SIGNIFICANT CHANGE UP
-  CEFOXITIN: SIGNIFICANT CHANGE UP
-  CEFTRIAXONE: SIGNIFICANT CHANGE UP
-  CIPROFLOXACIN: SIGNIFICANT CHANGE UP
-  ERTAPENEM: SIGNIFICANT CHANGE UP
-  GENTAMICIN: SIGNIFICANT CHANGE UP
-  LEVOFLOXACIN: SIGNIFICANT CHANGE UP
-  MEROPENEM: SIGNIFICANT CHANGE UP
-  NITROFURANTOIN: SIGNIFICANT CHANGE UP
-  PIPERACILLIN/TAZOBACTAM: SIGNIFICANT CHANGE UP
-  TIGECYCLINE: SIGNIFICANT CHANGE UP
-  TOBRAMYCIN: SIGNIFICANT CHANGE UP
-  TRIMETHOPRIM/SULFAMETHOXAZOLE: SIGNIFICANT CHANGE UP
CULTURE RESULTS: SIGNIFICANT CHANGE UP
METHOD TYPE: SIGNIFICANT CHANGE UP
ORGANISM # SPEC MICROSCOPIC CNT: SIGNIFICANT CHANGE UP
ORGANISM # SPEC MICROSCOPIC CNT: SIGNIFICANT CHANGE UP
SPECIMEN SOURCE: SIGNIFICANT CHANGE UP
TSH SERPL-MCNC: 2.36 UIU/ML — SIGNIFICANT CHANGE UP (ref 0.27–4.2)

## 2020-12-28 PROCEDURE — 93970 EXTREMITY STUDY: CPT | Mod: 26

## 2020-12-28 PROCEDURE — 93306 TTE W/DOPPLER COMPLETE: CPT | Mod: 26

## 2020-12-28 RX ADMIN — Medication 30 MILLIGRAM(S): at 06:25

## 2020-12-28 RX ADMIN — SENNA PLUS 1 TABLET(S): 8.6 TABLET ORAL at 11:31

## 2020-12-28 NOTE — PHYSICAL THERAPY INITIAL EVALUATION ADULT - PERTINENT HX OF CURRENT PROBLEM, REHAB EVAL
Pt is a 89 y/o female with PMH of gerd, chronic lymphedema, asthma, HLD presenting for finger deformity s/p fall. Pt with L 4th finger fracture; s/p reduction of the L 4th proximal phalanx. US BLE: (-) DVT. Xray L hand 12/25: Comminuted, angulated fracture at the fourth proximal phalanx. Flexion at the fourth proximal interphalangeal joint on all views.

## 2020-12-28 NOTE — PHYSICAL THERAPY INITIAL EVALUATION ADULT - PLANNED THERAPY INTERVENTIONS, PT EVAL
GOAL: Pt will be able to negotiate 12 steps with Aung in 2 weeks./balance training/bed mobility training/gait training/transfer training

## 2020-12-28 NOTE — PHYSICAL THERAPY INITIAL EVALUATION ADULT - ADDITIONAL COMMENTS
Pt lives with family on the second floor with3 steps to enter, 1 flight to the upstairs. Pt's dtr, son and dtr in law live with her and assist with ADLs. Pt lives with family on the second floor with3 steps to enter, 1 flight to the upstairs. Pt's dtr, son and dtr in law live with her and assist with ADLs. Pt used a RW for amb.

## 2020-12-29 LAB
ANION GAP SERPL CALC-SCNC: 12 MMOL/L — SIGNIFICANT CHANGE UP (ref 5–17)
BUN SERPL-MCNC: 25 MG/DL — HIGH (ref 7–23)
CALCIUM SERPL-MCNC: 9.1 MG/DL — SIGNIFICANT CHANGE UP (ref 8.4–10.5)
CHLORIDE SERPL-SCNC: 106 MMOL/L — SIGNIFICANT CHANGE UP (ref 96–108)
CO2 SERPL-SCNC: 19 MMOL/L — LOW (ref 22–31)
CREAT SERPL-MCNC: 0.86 MG/DL — SIGNIFICANT CHANGE UP (ref 0.5–1.3)
GLUCOSE SERPL-MCNC: 104 MG/DL — HIGH (ref 70–99)
HCT VFR BLD CALC: 45.1 % — HIGH (ref 34.5–45)
HGB BLD-MCNC: 14.6 G/DL — SIGNIFICANT CHANGE UP (ref 11.5–15.5)
MCHC RBC-ENTMCNC: 31.6 PG — SIGNIFICANT CHANGE UP (ref 27–34)
MCHC RBC-ENTMCNC: 32.4 GM/DL — SIGNIFICANT CHANGE UP (ref 32–36)
MCV RBC AUTO: 97.6 FL — SIGNIFICANT CHANGE UP (ref 80–100)
NRBC # BLD: 0 /100 WBCS — SIGNIFICANT CHANGE UP (ref 0–0)
PLATELET # BLD AUTO: 297 K/UL — SIGNIFICANT CHANGE UP (ref 150–400)
POTASSIUM SERPL-MCNC: 4.3 MMOL/L — SIGNIFICANT CHANGE UP (ref 3.5–5.3)
POTASSIUM SERPL-SCNC: 4.3 MMOL/L — SIGNIFICANT CHANGE UP (ref 3.5–5.3)
RBC # BLD: 4.62 M/UL — SIGNIFICANT CHANGE UP (ref 3.8–5.2)
RBC # FLD: 14.4 % — SIGNIFICANT CHANGE UP (ref 10.3–14.5)
SODIUM SERPL-SCNC: 137 MMOL/L — SIGNIFICANT CHANGE UP (ref 135–145)
WBC # BLD: 9.89 K/UL — SIGNIFICANT CHANGE UP (ref 3.8–10.5)
WBC # FLD AUTO: 9.89 K/UL — SIGNIFICANT CHANGE UP (ref 3.8–10.5)

## 2020-12-29 PROCEDURE — 73030 X-RAY EXAM OF SHOULDER: CPT | Mod: 26,RT

## 2020-12-29 NOTE — PROVIDER CONTACT NOTE (OTHER) - SITUATION
Pt breaking in and out of episodes of ventricular bigeminy and SR (approximately 15-30 seconds episodes)

## 2020-12-29 NOTE — PROVIDER CONTACT NOTE (OTHER) - ACTION/TREATMENT ORDERED:
NIKKO Forbes notified. As per NIKKO Forbes, no interventions at this moment. Will continue to monitor pt on tele.

## 2020-12-29 NOTE — PROVIDER CONTACT NOTE (OTHER) - ASSESSMENT
Pt is A&OX1-2, no s/s or verbalization of distress, discomfort, pain, SOB from pt. VSS, see flow sheet. Pt currently still breaking in and out of episodes of ventricular bigeminy and SR

## 2020-12-30 LAB
ANION GAP SERPL CALC-SCNC: 12 MMOL/L — SIGNIFICANT CHANGE UP (ref 5–17)
BUN SERPL-MCNC: 31 MG/DL — HIGH (ref 7–23)
CALCIUM SERPL-MCNC: 8.9 MG/DL — SIGNIFICANT CHANGE UP (ref 8.4–10.5)
CHLORIDE SERPL-SCNC: 108 MMOL/L — SIGNIFICANT CHANGE UP (ref 96–108)
CO2 SERPL-SCNC: 20 MMOL/L — LOW (ref 22–31)
CREAT SERPL-MCNC: 1.04 MG/DL — SIGNIFICANT CHANGE UP (ref 0.5–1.3)
GLUCOSE SERPL-MCNC: 99 MG/DL — SIGNIFICANT CHANGE UP (ref 70–99)
HCT VFR BLD CALC: 42.9 % — SIGNIFICANT CHANGE UP (ref 34.5–45)
HGB BLD-MCNC: 13.4 G/DL — SIGNIFICANT CHANGE UP (ref 11.5–15.5)
MCHC RBC-ENTMCNC: 30.9 PG — SIGNIFICANT CHANGE UP (ref 27–34)
MCHC RBC-ENTMCNC: 31.2 GM/DL — LOW (ref 32–36)
MCV RBC AUTO: 99.1 FL — SIGNIFICANT CHANGE UP (ref 80–100)
NRBC # BLD: 0 /100 WBCS — SIGNIFICANT CHANGE UP (ref 0–0)
PLATELET # BLD AUTO: 258 K/UL — SIGNIFICANT CHANGE UP (ref 150–400)
POTASSIUM SERPL-MCNC: 4.4 MMOL/L — SIGNIFICANT CHANGE UP (ref 3.5–5.3)
POTASSIUM SERPL-SCNC: 4.4 MMOL/L — SIGNIFICANT CHANGE UP (ref 3.5–5.3)
RBC # BLD: 4.33 M/UL — SIGNIFICANT CHANGE UP (ref 3.8–5.2)
RBC # FLD: 14.6 % — HIGH (ref 10.3–14.5)
SODIUM SERPL-SCNC: 140 MMOL/L — SIGNIFICANT CHANGE UP (ref 135–145)
WBC # BLD: 7.62 K/UL — SIGNIFICANT CHANGE UP (ref 3.8–10.5)
WBC # FLD AUTO: 7.62 K/UL — SIGNIFICANT CHANGE UP (ref 3.8–10.5)

## 2020-12-30 PROCEDURE — 93880 EXTRACRANIAL BILAT STUDY: CPT | Mod: 26

## 2020-12-30 PROCEDURE — 73130 X-RAY EXAM OF HAND: CPT | Mod: 26,LT

## 2020-12-30 RX ADMIN — SENNA PLUS 1 TABLET(S): 8.6 TABLET ORAL at 11:43

## 2020-12-30 NOTE — CHART NOTE - NSCHARTNOTEFT_GEN_A_CORE
Spoke to Dr Yuan Muir (plastics) regarding L 4th digit fracture. Initial consult was denied 2/2 advanced age, confusion, and non-dominant hand fracture. Advised to not attempt another reduction and may use tape but no further interventions. Previous reduction preformed by ED, not orthopedics.    Neri Kaye) Blanca RODRIGUEZ  [id number]

## 2020-12-31 ENCOUNTER — TRANSCRIPTION ENCOUNTER (OUTPATIENT)
Age: 85
End: 2020-12-31

## 2020-12-31 VITALS
SYSTOLIC BLOOD PRESSURE: 150 MMHG | HEART RATE: 82 BPM | OXYGEN SATURATION: 95 % | DIASTOLIC BLOOD PRESSURE: 98 MMHG | TEMPERATURE: 98 F | RESPIRATION RATE: 18 BRPM

## 2020-12-31 LAB
ANION GAP SERPL CALC-SCNC: 10 MMOL/L — SIGNIFICANT CHANGE UP (ref 5–17)
BUN SERPL-MCNC: 36 MG/DL — HIGH (ref 7–23)
CALCIUM SERPL-MCNC: 9.1 MG/DL — SIGNIFICANT CHANGE UP (ref 8.4–10.5)
CHLORIDE SERPL-SCNC: 106 MMOL/L — SIGNIFICANT CHANGE UP (ref 96–108)
CO2 SERPL-SCNC: 22 MMOL/L — SIGNIFICANT CHANGE UP (ref 22–31)
CREAT SERPL-MCNC: 0.98 MG/DL — SIGNIFICANT CHANGE UP (ref 0.5–1.3)
GLUCOSE SERPL-MCNC: 96 MG/DL — SIGNIFICANT CHANGE UP (ref 70–99)
HCT VFR BLD CALC: 42.6 % — SIGNIFICANT CHANGE UP (ref 34.5–45)
HGB BLD-MCNC: 13.6 G/DL — SIGNIFICANT CHANGE UP (ref 11.5–15.5)
MCHC RBC-ENTMCNC: 31.5 PG — SIGNIFICANT CHANGE UP (ref 27–34)
MCHC RBC-ENTMCNC: 31.9 GM/DL — LOW (ref 32–36)
MCV RBC AUTO: 98.6 FL — SIGNIFICANT CHANGE UP (ref 80–100)
NRBC # BLD: 0 /100 WBCS — SIGNIFICANT CHANGE UP (ref 0–0)
PLATELET # BLD AUTO: 263 K/UL — SIGNIFICANT CHANGE UP (ref 150–400)
POTASSIUM SERPL-MCNC: 4.5 MMOL/L — SIGNIFICANT CHANGE UP (ref 3.5–5.3)
POTASSIUM SERPL-SCNC: 4.5 MMOL/L — SIGNIFICANT CHANGE UP (ref 3.5–5.3)
RBC # BLD: 4.32 M/UL — SIGNIFICANT CHANGE UP (ref 3.8–5.2)
RBC # FLD: 14.6 % — HIGH (ref 10.3–14.5)
SODIUM SERPL-SCNC: 138 MMOL/L — SIGNIFICANT CHANGE UP (ref 135–145)
WBC # BLD: 6.83 K/UL — SIGNIFICANT CHANGE UP (ref 3.8–10.5)
WBC # FLD AUTO: 6.83 K/UL — SIGNIFICANT CHANGE UP (ref 3.8–10.5)

## 2020-12-31 RX ADMIN — SENNA PLUS 1 TABLET(S): 8.6 TABLET ORAL at 11:37

## 2020-12-31 NOTE — DIETITIAN INITIAL EVALUATION ADULT. - CHIEF COMPLAINT
Per chart, pt is 89yoF with PMHx significant for GERD, lymphedema, HLD, asthma, presenting s/p fall with phalanx fracture.

## 2020-12-31 NOTE — DISCHARGE NOTE PROVIDER - CARE PROVIDER_API CALL
Linda James  NEUROLOGY  [address] [place] [address]  Phone: [phone]  Fax: [phone]  Follow Up Time:     Reg Kim  CARDIOVASCULAR DISEASE  [address] [address]  Phone: [phone]  Fax: [phone]  Follow Up Time:

## 2020-12-31 NOTE — DISCHARGE NOTE PROVIDER - DETAILS OF MALNUTRITION DIAGNOSIS/DIAGNOSES
This patient has been assessed with a concern for Malnutrition and was treated during this hospitalization for the following Nutrition diagnosis/diagnoses:     -  12/31/2020: Mild protein-calorie malnutrition

## 2020-12-31 NOTE — DIETITIAN INITIAL EVALUATION ADULT. - OTHER INFO
Nutrition Supplements PTA: none    Pt UBW: pt doesn't know.   Weight history per chart: 122.5 lbs (11/9/19).  Pt dosing wt noted as 149.9 lbs, question accuracy. Bed scale wt obtained by this RD today is 124 lbs (unable to zero bed scale), would indicate stable wt status PTA.    Pt reports good appetite and po intake on current admission, she is noted with ~75% po intake at meals per nursing flow sheet.  Pt denies swallowing difficulties, does endorses some chewing difficulties in setting of some missing dentition. Pt amenable to downgrading diet texture to Soft to promote tolerance.  Pt has no c/o nausea, vomiting, diarrhea, or constipation.     Pt amenable to addition of 1 ensure enlive daily to promote adequate po intake.

## 2020-12-31 NOTE — DISCHARGE NOTE PROVIDER - HOSPITAL COURSE
89 year old female PMH GERD, chronic Lymphedema, Asthma, HLD not compliant with her torsemide admitted for finger deformity s/p fall. Patient's daughter states pt has been falling frequently and is not compliant with her walker. Orthostatics negative. CT head negative for acute infarct or hemorrhage. Left hand xray shows Comminuted fracture of the proximal phalanx of the ring finger.  Fracture was reduced in the emergency department and volar splint was applied.  During hospital course, patient lost splint,  plastics was consulted and did not advise any further interventions, phalanx does not require further splinting or repeat reduction.  Patient was evaluated by cardiology and neurology during hospital course, no further recommendations were made.  Physical therapy was consulted, and recommendations were made for Rehab.  Family and patient opted for home with homecare.  Patient stable for discharge home, follow up with PMD.

## 2020-12-31 NOTE — PROGRESS NOTE ADULT - REASON FOR ADMISSION
s/p fall today

## 2020-12-31 NOTE — DISCHARGE NOTE PROVIDER - NSDCCPCAREPLAN_GEN_ALL_CORE_FT
PRINCIPAL DISCHARGE DIAGNOSIS  Diagnosis: Fall  Assessment and Plan of Treatment: Take all your medications as prescribed by your physician.  Ensure that you have assistance when ambulating.   Follow up with your outpatient PMD in 1-2 weeks of discharge from hospital.         SECONDARY DISCHARGE DIAGNOSES  Diagnosis: Hypertension  Assessment and Plan of Treatment: Low salt diet  Activity as tolerated.  Take all medication as prescribed.  Follow up with your medical doctor for routine blood pressure monitoring at your next visit.  Notify your doctor if you have any of the following symptoms:   Dizziness, Lightheadedness, Blurry vision, Headache, Chest pain, Shortness of breath      Diagnosis: Displaced fracture of phalanx of finger  Assessment and Plan of Treatment: You were seen by plastic surgery, no further interventions were  recommended.

## 2020-12-31 NOTE — DIETITIAN INITIAL EVALUATION ADULT. - PHYSCIAL ASSESSMENT
Visually pt with age related muscle/fat wasting noted. Nutrition Focused Physical Exam performed with pt's verbal consent with findings noted below./other (specify)

## 2020-12-31 NOTE — DISCHARGE NOTE NURSING/CASE MANAGEMENT/SOCIAL WORK - PATIENT PORTAL LINK FT
You can access the FollowMyHealth Patient Portal offered by [facility] by registering at the following website: [email or web address]. By joining [email or web address]’s FollowMyHealth portal, you will also be able to view your health information using other applications (apps) compatible with our system.

## 2020-12-31 NOTE — PROGRESS NOTE ADULT - SUBJECTIVE AND OBJECTIVE BOX
Patient is a 89y old  Female who presents with a chief complaint of s/p fall today (26 Dec 2020 10:01)      INTERVAL HPI/OVERNIGHT EVENTS:  T(C): 36.4 (20 @ 11:39), Max: 37.1 (20 @ 22:30)  HR: 64 (20 @ 17:08) (56 - 82)  BP: 142/70 (20 @ 17:08) (142/70 - 153/80)  RR: 18 (20 @ 11:39) (18 - 18)  SpO2: 95% (20 @ 11:39) (94% - 100%)  Wt(kg): --  I&O's Summary    26 Dec 2020 07:01  -  26 Dec 2020 17:29  --------------------------------------------------------  IN: 200 mL / OUT: 575 mL / NET: -375 mL        LABS:                        13.2   5.97  )-----------( 232      ( 25 Dec 2020 17:07 )             41.2     12-25    140  |  107  |  36<H>  ----------------------------<  126<H>  4.4   |  21<L>  |  0.99    Ca    9.8      25 Dec 2020 17:07  Phos  3.4       Mg     2.0         TPro  6.2  /  Alb  3.7  /  TBili  0.7  /  DBili  x   /  AST  23  /  ALT  21  /  AlkPhos  106  12-25    PT/INR - ( 25 Dec 2020 17:07 )   PT: 11.4 sec;   INR: 0.95 ratio         PTT - ( 25 Dec 2020 17:07 )  PTT:28.0 sec  Urinalysis Basic - ( 26 Dec 2020 06:51 )    Color: Yellow / Appearance: Slightly Turbid / S.023 / pH: x  Gluc: x / Ketone: Trace  / Bili: Negative / Urobili: Negative   Blood: x / Protein: 30 mg/dL / Nitrite: Negative   Leuk Esterase: Large / RBC: 30 /hpf / WBC 19 /HPF   Sq Epi: x / Non Sq Epi: 0 /hpf / Bacteria: Few      CAPILLARY BLOOD GLUCOSE            Urinalysis Basic - ( 26 Dec 2020 06:51 )    Color: Yellow / Appearance: Slightly Turbid / S.023 / pH: x  Gluc: x / Ketone: Trace  / Bili: Negative / Urobili: Negative   Blood: x / Protein: 30 mg/dL / Nitrite: Negative   Leuk Esterase: Large / RBC: 30 /hpf / WBC 19 /HPF   Sq Epi: x / Non Sq Epi: 0 /hpf / Bacteria: Few        MEDICATIONS  (STANDING):  diltiazem    Tablet 30 milliGRAM(s) Oral two times a day  lidocaine 2% (Preservative-free) Injectable 10 milliLiter(s) Local Injection Once  senna 1 Tablet(s) Oral daily    MEDICATIONS  (PRN):  polyethylene glycol 3350 17 Gram(s) Oral daily PRN Constipation          PHYSICAL EXAM:  GENERAL: NAD, well-groomed, well-developed  HEAD:  Atraumatic, Normocephalic  CHEST/LUNG: Clear to percussion bilaterally; No rales, rhonchi, wheezing, or rubs  HEART: Regular rate and rhythm; No murmurs, rubs, or gallops  ABDOMEN: Soft, Nontender, Nondistended; Bowel sounds present  EXTREMITIES:  2+ Peripheral Pulses, No clubbing, cyanosis, or edema  LYMPH: No lymphadenopathy noted  SKIN: No rashes or lesions    Care Discussed with Consultants/Other Providers [ ] YES  [ ] NO
Subjective: Patient seen and examined. No new events except as noted.     SUBJECTIVE/ROS:        MEDICATIONS:  MEDICATIONS  (STANDING):  lidocaine 2% (Preservative-free) Injectable 10 milliLiter(s) Local Injection Once  senna 1 Tablet(s) Oral daily      PHYSICAL EXAM:  T(C): 36.7 (12-29-20 @ 20:05), Max: 36.7 (12-29-20 @ 20:05)  HR: 74 (12-29-20 @ 20:05) (74 - 74)  BP: 129/69 (12-29-20 @ 20:05) (129/69 - 129/69)  RR: 18 (12-29-20 @ 20:05) (18 - 18)  SpO2: 96% (12-29-20 @ 20:05) (96% - 96%)  Wt(kg): --  I&O's Summary    29 Dec 2020 07:01  -  30 Dec 2020 07:00  --------------------------------------------------------  IN: 200 mL / OUT: 300 mL / NET: -100 mL            JVP: Normal  Neck: supple  Lung: clear   CV: S1 S2 , Murmur:  Abd: soft  Ext: No edema  neuro: Awake / alert  Psych: flat affect  Skin: normal``    LABS/DATA:    CARDIAC MARKERS:                                13.4   7.62  )-----------( 258      ( 30 Dec 2020 06:32 )             42.9     12-29    137  |  106  |  25<H>  ----------------------------<  104<H>  4.3   |  19<L>  |  0.86    Ca    9.1      29 Dec 2020 06:12      proBNP:   Lipid Profile:   HgA1c:   TSH:     TELE:  EKG:        
Subjective: Patient seen and examined. No new events except as noted.     SUBJECTIVE/ROS:        MEDICATIONS:  MEDICATIONS  (STANDING):  lidocaine 2% (Preservative-free) Injectable 10 milliLiter(s) Local Injection Once  senna 1 Tablet(s) Oral daily      PHYSICAL EXAM:  T(C): 36.8 (12-29-20 @ 06:45), Max: 36.8 (12-29-20 @ 06:45)  HR: 72 (12-29-20 @ 06:45) (59 - 85)  BP: 148/66 (12-29-20 @ 06:45) (127/75 - 148/66)  RR: 18 (12-29-20 @ 06:45) (18 - 18)  SpO2: 95% (12-29-20 @ 06:45) (94% - 95%)  Wt(kg): --  I&O's Summary    28 Dec 2020 07:01  -  29 Dec 2020 07:00  --------------------------------------------------------  IN: 530 mL / OUT: 200 mL / NET: 330 mL            JVP: Normal  Neck: supple  Lung: clear   CV: S1 S2 , Murmur:  Abd: soft  Ext: No edema  neuro: Awake / alert  Psych: flat affect  Skin: normal``    LABS/DATA:    CARDIAC MARKERS:                                14.6   9.89  )-----------( 297      ( 29 Dec 2020 06:19 )             45.1     12-29    137  |  106  |  25<H>  ----------------------------<  104<H>  4.3   |  19<L>  |  0.86    Ca    9.1      29 Dec 2020 06:12      proBNP:   Lipid Profile:   HgA1c:   TSH: Thyroid Stimulating Hormone, Serum: 2.36 uIU/mL (12-28 @ 08:28)      TELE:  EKG:      < from: Transthoracic Echocardiogram (12.28.20 @ 12:17) >  Conclusions:  1. Endocardium not well visualized; grossly normal left  ventricular systolic function.  2. The right ventricle is not well visualized; grossly  normal right ventricular systolic function.  Patient uncooperative with exam.  ------------------------------------------------------------------------  Confirmed on  12/28/2020 - 15:42:40 by RASHEL Mclain  ------------------------------------------------------------------------    < end of copied text >    
Subjective: Patient seen and examined. No new events except as noted.     SUBJECTIVE/ROS:  NAD      MEDICATIONS:  MEDICATIONS  (STANDING):  lidocaine 2% (Preservative-free) Injectable 10 milliLiter(s) Local Injection Once  senna 1 Tablet(s) Oral daily      PHYSICAL EXAM:  T(C): 36.5 (12-27-20 @ 17:35), Max: 36.6 (12-27-20 @ 11:12)  HR: 75 (12-28-20 @ 05:16) (68 - 77)  BP: 148/81 (12-28-20 @ 05:16) (148/81 - 159/86)  RR: 17 (12-27-20 @ 17:35) (17 - 18)  SpO2: 95% (12-27-20 @ 17:35) (93% - 95%)  Wt(kg): --  I&O's Summary    27 Dec 2020 07:01  -  28 Dec 2020 07:00  --------------------------------------------------------  IN: 240 mL / OUT: 350 mL / NET: -110 mL            JVP: Normal  Neck: supple  Lung: clear   CV: S1 S2 , Murmur:  Abd: soft  Ext: No edema  neuro: Awake / alert  Psych: flat affect  Skin: normal``    LABS/DATA:    CARDIAC MARKERS:                                12.8   6.76  )-----------( 236      ( 27 Dec 2020 06:33 )             40.6     12-27    139  |  106  |  26<H>  ----------------------------<  103<H>  4.5   |  20<L>  |  0.97    Ca    9.1      27 Dec 2020 06:33  Mg     1.9     12-27      proBNP:   Lipid Profile:   HgA1c:   TSH: Thyroid Stimulating Hormone, Serum: 2.14 uIU/mL (12-27 @ 08:53)      TELE:  EKG:        
[facility]      Seen earlier today, and examined.  - Today, patient is without complaints.           *****MEDICATIONS: Current medication reviewed and documented.    MEDICATIONS  (STANDING):  lidocaine 2% (Preservative-free) Injectable 10 milliLiter(s) Local Injection Once  senna 1 Tablet(s) Oral daily    MEDICATIONS  (PRN):  polyethylene glycol 3350 17 Gram(s) Oral daily PRN Constipation          ***** VITAL SIGNS:  T(F): 98 (20 @ 11:33), Max: 98.1 (20 @ 20:05)  HR: 80 (20 @ 13:50) (74 - 80)  BP: 162/85 (20 @ 13:50) (129/69 - 162/85)  RR: 18 (20 @ 13:50) (18 - 18)  SpO2: 96% (20 @ 13:50) (94% - 96%)  Wt(kg): --  ,   I&O's Summary    29 Dec 2020 07:  -  30 Dec 2020 07:00  --------------------------------------------------------  IN: 200 mL / OUT: 300 mL / NET: -100 mL    30 Dec 2020 07:  -  30 Dec 2020 19:44  --------------------------------------------------------  IN: 480 mL / OUT: 800 mL / NET: -320 mL             *****PHYSICAL EXAM:   alert oriented x 2 attention comprehension are limited   impulsive     Able to name, repeat.   EOmi fundi not visualized   no nystagmus VFF to confrontation  Tongue is midline  Palate elevates symmetrically   Moving all 4 ext spontaneously no drift appreciated  rue with limited mvt above 90   Gait not assessed.            *****LAB AND IMAGIN.4   7.62  )-----------( 258      ( 30 Dec 2020 06:32 )             42.9               12    140  |  108  |  31<H>  ----------------------------<  99  4.4   |  20<L>  |  1.04    Ca    8.9      30 Dec 2020 06:34                           [All pertinent recent Imaging/Reports reviewed]           *****A S S E S S M E N T   A N D   P L A N :        89 F with pmhx gerd, chronic lymphedema, asthma, HLD presenting for finger deformity s/p fall.   Patient very confused, anxious to go home, reports pain in her Left 4 th finger. .          Problem/Recommendations 1: recurrent falls of unclear etiology   deconditioning vs orthostatic   pt uses walker at baseline, but somewhat non compliant with it as per chart      b12/folate/tsh wnl     pt eval appreciated recommending katya but family is deffering         Problem/Recommendations 2:  R shoulder with limited mvt ? related to fall   would get xray  of  R shoulder no acute fracture, chronic changes as noted   left finger splint was off, d/w primary pa, to call orthopedics for a reduction   Thank you for allowing me to participate in the care of this patient. Please do not hesitate to call me if you have any  questions.        ________________  Linda James MD  [facility] ([facility]  [phone]      33 minutes spent on total encounter; more than 50 % of the visit was  spent counseling about plan of care, compliance to diet/exercise and medication regimen and or  coordinating care by the attending physician.      It is advised that stroke patients follow up with GABRIELLA Zaldivar @ [phone] in 1- 2 weeks.   Others please follow up with Dr. Michael Nissenbaum [phone]
Patient is a 89y old  Female who presents with a chief complaint of s/p fall today (27 Dec 2020 08:14)      INTERVAL HPI/OVERNIGHT EVENTS:  T(C): 36.5 (20 @ 17:35), Max: 37.1 (20 @ 20:43)  HR: 77 (20 @ 17:35) (68 - 100)  BP: 156/95 (20 @ 17:35) (135/80 - 164/90)  RR: 17 (20 @ 17:35) (17 - 20)  SpO2: 95% (20 @ 17:35) (93% - 95%)  Wt(kg): --  I&O's Summary    26 Dec 2020 07:  -  27 Dec 2020 07:00  --------------------------------------------------------  IN: 450 mL / OUT: 575 mL / NET: -125 mL    27 Dec 2020 07:01  -  27 Dec 2020 17:44  --------------------------------------------------------  IN: 240 mL / OUT: 350 mL / NET: -110 mL        LABS:                        12.8   6.76  )-----------( 236      ( 27 Dec 2020 06:33 )             40.6         139  |  106  |  26<H>  ----------------------------<  103<H>  4.5   |  20<L>  |  0.97    Ca    9.1      27 Dec 2020 06:33  Mg     1.9             Urinalysis Basic - ( 26 Dec 2020 06:51 )    Color: Yellow / Appearance: Slightly Turbid / S.023 / pH: x  Gluc: x / Ketone: Trace  / Bili: Negative / Urobili: Negative   Blood: x / Protein: 30 mg/dL / Nitrite: Negative   Leuk Esterase: Large / RBC: 30 /hpf / WBC 19 /HPF   Sq Epi: x / Non Sq Epi: 0 /hpf / Bacteria: Few      CAPILLARY BLOOD GLUCOSE            Urinalysis Basic - ( 26 Dec 2020 06:51 )    Color: Yellow / Appearance: Slightly Turbid / S.023 / pH: x  Gluc: x / Ketone: Trace  / Bili: Negative / Urobili: Negative   Blood: x / Protein: 30 mg/dL / Nitrite: Negative   Leuk Esterase: Large / RBC: 30 /hpf / WBC 19 /HPF   Sq Epi: x / Non Sq Epi: 0 /hpf / Bacteria: Few        MEDICATIONS  (STANDING):  diltiazem    Tablet 30 milliGRAM(s) Oral two times a day  lidocaine 2% (Preservative-free) Injectable 10 milliLiter(s) Local Injection Once  senna 1 Tablet(s) Oral daily    MEDICATIONS  (PRN):  polyethylene glycol 3350 17 Gram(s) Oral daily PRN Constipation          PHYSICAL EXAM:  GENERAL: NAD, well-groomed, well-developed  HEAD:  Atraumatic, Normocephalic  CHEST/LUNG: Clear to percussion bilaterally; No rales, rhonchi, wheezing, or rubs  HEART: Regular rate and rhythm; No murmurs, rubs, or gallops  ABDOMEN: Soft, Nontender, Nondistended; Bowel sounds present  EXTREMITIES:  2+ Peripheral Pulses, No clubbing, cyanosis, or edema  LYMPH: No lymphadenopathy noted  SKIN: No rashes or lesions    Care Discussed with Consultants/Other Providers [ ] YES  [ ] NO
[facility]      Seen earlier today, and examined.  - Today, patient is without complaints.    pt anxious to go home          *****MEDICATIONS: Current medication reviewed and documented.    MEDICATIONS  (STANDING):  lidocaine 2% (Preservative-free) Injectable 10 milliLiter(s) Local Injection Once  senna 1 Tablet(s) Oral daily    MEDICATIONS  (PRN):  polyethylene glycol 3350 17 Gram(s) Oral daily PRN Constipation          ***** VITAL SIGNS:  T(F): 98.2 (20 @ 06:45), Max: 98.2 (20 @ 06:45)  HR: 72 (20 @ 06:45) (59 - 85)  BP: 148/66 (20 @ 06:45) (127/75 - 148/66)  RR: 18 (20 @ 06:45) (18 - 18)  SpO2: 95% (20 @ 06:45) (94% - 95%)  Wt(kg): --  ,   I&O's Summary    28 Dec 2020 07:01  -  29 Dec 2020 07:00  --------------------------------------------------------  IN: 530 mL / OUT: 200 mL / NET: 330 mL             *****PHYSICAL EXAM: Alert oriented x2   Attention comprehension are limited  Able to name, repeat,   without any difficulty.   Able to follow  1 step commands.     EOMI fundi not visualized,  counts finger but somewhat inconsistent     No facial asymmetry   Tongue is midline   Palate elevates symmetrically   Moving all 4 ext symmetrically   limited mvt of the Rue above 90 degrees     Reflexes are diminished  throughout   sensation is grossly symmetric  Gait : not assessed.  B/L down going toes          *****LAB AND IMAGIN.6   9.89  )-----------( 297      ( 29 Dec 2020 06:19 )             45.1                   137  |  106  |  25<H>  ----------------------------<  104<H>  4.3   |  19<L>  |  0.86    Ca    9.1      29 Dec 2020 06:12                           [All pertinent recent Imaging/Reports reviewed]           *****A S S E S S M E N T   A N D   P L A N :  89 F with pmhx gerd, chronic lymphedema, asthma, HLD presenting for finger deformity s/p fall.   Patient very confused, anxious to go home, reports pain in her Left 4 th finger. .          Problem/Recommendations 1: recurrent falls of unclear etiology   deconditioning vs orthostatic   pt uses walker at baseline, but somewhat non compliant with it as per chart      b12/folate/tsh wnl     pt eval appreciated recommending katya         Problem/Recommendations 2:  R shoulder with limited mvt ? related to fall   would get xray  of  R shoulder         Thank you for allowing me to participate in the care of this patient. Please do not hesitate to call me if you have any  questions.        ________________  Linda James MD  [facility] ([facility]  [phone]      33 minutes spent on total encounter; more than 50 % of the visit was  spent counseling about plan of care, compliance to diet/exercise and medication regimen and or  coordinating care by the attending physician.      It is advised that stroke patients follow up with GABRIELLA Zaldivar @ [phone] in 1- 2 weeks.   Others please follow up with Dr. Michael Nissenbaum [phone]
[facility]      Seen earlier today, and examined.  - Today, patient is without complaints.           *****MEDICATIONS: Current medication reviewed and documented.    MEDICATIONS  (STANDING):  lidocaine 2% (Preservative-free) Injectable 10 milliLiter(s) Local Injection Once  senna 1 Tablet(s) Oral daily    MEDICATIONS  (PRN):  polyethylene glycol 3350 17 Gram(s) Oral daily PRN Constipation          ***** VITAL SIGNS:  T(F): 98.2 (20 @ 06:45), Max: 98.2 (20 @ 06:45)  HR: 72 (20 @ 06:45) (59 - 85)  BP: 148/66 (20 @ 06:45) (138/80 - 148/66)  RR: 18 (20 @ 06:45) (18 - 18)  SpO2: 95% (20 @ 06:45) (94% - 95%)  Wt(kg): --  ,   I&O's Summary    28 Dec 2020 07:  -  29 Dec 2020 07:00  --------------------------------------------------------  IN: 530 mL / OUT: 200 mL / NET: 330 mL    29 Dec 2020 07:  -  29 Dec 2020 18:25  --------------------------------------------------------  IN: 0 mL / OUT: 300 mL / NET: -300 mL             *****PHYSICAL EXAM: Alert oriented x2   Attention comprehension are limited  Able to name, repeat,   without any difficulty.   Able to follow  1 step commands.     EOMI fundi not visualized,  counts finger but somewhat inconsistent     No facial asymmetry   Tongue is midline   Palate elevates symmetrically   Moving all 4 ext symmetrically   limited mvt of the Rue above 90 degrees     Reflexes are diminished  throughout   sensation is grossly symmetric  Gait : not assessed.  B/L down going toes            *****LAB AND IMAGIN.6   9.89  )-----------( 297      ( 29 Dec 2020 06:19 )             45.1               12    137  |  106  |  25<H>  ----------------------------<  104<H>  4.3   |  19<L>  |  0.86    Ca    9.1      29 Dec 2020 06:12                           [All pertinent recent Imaging/Reports reviewed]           *****A S S E S S M E N T   A N D   P L A N :           *****A S S E S S M E N T   A N D   P L A N :  89 F with pmhx gerd, chronic lymphedema, asthma, HLD presenting for finger deformity s/p fall.   Patient very confused, anxious to go home, reports pain in her Left 4 th finger. .          Problem/Recommendations 1: recurrent falls of unclear etiology   deconditioning vs orthostatic   pt uses walker at baseline, but somewhat non compliant with it as per chart      b12/folate/tsh wnl     pt eval appreciated recommending katya         Problem/Recommendations 2:  R shoulder with limited mvt ? related to fall   would get xray  of  R shoulder no acute fracture, chronic changes as noted       Thank you for allowing me to participate in the care of this patient. Please do not hesitate to call me if you have any  questions.        ________________  Linda James MD  [facility] ([facility]  [phone]      33 minutes spent on total encounter; more than 50 % of the visit was  spent counseling about plan of care, compliance to diet/exercise and medication regimen and or  coordinating care by the attending physician.      It is advised that stroke patients follow up with GABRIELLA Zaldivar @ [phone] in 1- 2 weeks.   Others please follow up with Dr. Michael Nissenbaum [phone]
[facility]      Seen earlier today, and examined.  - Today, patient is without complaints.           *****MEDICATIONS: Current medication reviewed and documented.    MEDICATIONS  (STANDING):  lidocaine 2% (Preservative-free) Injectable 10 milliLiter(s) Local Injection Once  senna 1 Tablet(s) Oral daily    MEDICATIONS  (PRN):  polyethylene glycol 3350 17 Gram(s) Oral daily PRN Constipation          ***** VITAL SIGNS:  T(F): 98 (20 @ 16:22), Max: 99.2 (20 @ 19:58)  HR: 82 (20 @ 16:22) (65 - 82)  BP: 150/98 (20 @ 16:22) (130/82 - 150/98)  RR: 18 (20 @ 16:22) (18 - 18)  SpO2: 95% (20 @ 16:22) (95% - 96%)  Wt(kg): --  ,   I&O's Summary    30 Dec 2020 07:  -  31 Dec 2020 07:00  --------------------------------------------------------  IN: 480 mL / OUT: 1250 mL / NET: -770 mL    31 Dec 2020 07:  -  31 Dec 2020 19:52  --------------------------------------------------------  IN: 240 mL / OUT: 0 mL / NET: 240 mL             *****PHYSICAL EXAM:  alert oriented x 2 attention comprehension are limited   impulsive     Able to name, repeat.   EOmi fundi not visualized   no nystagmus VFF to confrontation  Tongue is midline  Palate elevates symmetrically   Moving all 4 ext spontaneously no drift appreciated  rue with limited mvt above 90   Gait not assessed.          *****LAB AND IMAGIN.6   6.83  )-----------( 263      ( 31 Dec 2020 06:40 )             42.6                   138  |  106  |  36<H>  ----------------------------<  96  4.5   |  22  |  0.98    Ca    9.1      31 Dec 2020 06:37                           [All pertinent recent Imaging/Reports reviewed]           *****A S S E S S M E N T   A N D   P L A N :  89 F with pmhx gerd, chronic lymphedema, asthma, HLD presenting for finger deformity s/p fall.   Patient very confused, anxious to go home, reports pain in her Left 4 th finger. .          Problem/Recommendations 1: recurrent falls of unclear etiology   deconditioning vs orthostatic   pt uses walker at baseline, but somewhat non compliant with it as per chart      b12/folate/tsh wnl     pt eval appreciated recommending katya but family is deffering         Problem/Recommendations 2:  R shoulder with limited mvt ? related to fall   would get xray  of  R shoulder no acute fracture, chronic changes as noted   left finger splint was off, d/w primary pa, however not a candidate for reduction as per surgery         Thank you for allowing me to participate in the care of this patient. Please do not hesitate to call me if you have any  questions.        ________________  Linda James MD  [facility]rological [facility] ([facility]  [phone]      33 minutes spent on total encounter; more than 50 % of the visit was  spent counseling about plan of care, compliance to diet/exercise and medication regimen and or  coordinating care by the attending physician.      It is advised that stroke patients follow up with GABRIELLA Zaldivar @ [phone] in 1- 2 weeks.   Others please follow up with Dr. Michael Nissenbaum [phone]
Patient is a 89y old  Female who presents with a chief complaint of s/p fall today (28 Dec 2020 07:43)      INTERVAL HPI/OVERNIGHT EVENTS:  T(C): 36.7 (12-28-20 @ 11:32), Max: 36.7 (12-28-20 @ 11:32)  HR: 61 (12-28-20 @ 13:18) (60 - 75)  BP: 127/75 (12-28-20 @ 13:18) (127/75 - 156/73)  RR: 18 (12-28-20 @ 13:18) (18 - 18)  SpO2: 95% (12-28-20 @ 16:05) (94% - 95%)  Wt(kg): --  I&O's Summary    27 Dec 2020 07:01  -  28 Dec 2020 07:00  --------------------------------------------------------  IN: 240 mL / OUT: 350 mL / NET: -110 mL    28 Dec 2020 07:01  -  28 Dec 2020 18:15  --------------------------------------------------------  IN: 380 mL / OUT: 200 mL / NET: 180 mL        LABS:                        12.8   6.76  )-----------( 236      ( 27 Dec 2020 06:33 )             40.6     12-27    139  |  106  |  26<H>  ----------------------------<  103<H>  4.5   |  20<L>  |  0.97    Ca    9.1      27 Dec 2020 06:33  Mg     1.9     12-27          CAPILLARY BLOOD GLUCOSE                MEDICATIONS  (STANDING):  lidocaine 2% (Preservative-free) Injectable 10 milliLiter(s) Local Injection Once  senna 1 Tablet(s) Oral daily    MEDICATIONS  (PRN):  polyethylene glycol 3350 17 Gram(s) Oral daily PRN Constipation          PHYSICAL EXAM:  GENERAL: NAD, well-groomed, well-developed  HEAD:  Atraumatic, Normocephalic  CHEST/LUNG: Clear to percussion bilaterally; No rales, rhonchi, wheezing, or rubs  HEART: Regular rate and rhythm; No murmurs, rubs, or gallops  ABDOMEN: Soft, Nontender, Nondistended; Bowel sounds present  EXTREMITIES:  2+ Peripheral Pulses, No clubbing, cyanosis, or edema  LYMPH: No lymphadenopathy noted  SKIN: No rashes or lesions    Care Discussed with Consultants/Other Providers [ ] YES  [ ] NO
Patient is a 89y old  Female who presents with a chief complaint of s/p fall today (29 Dec 2020 08:20)      INTERVAL HPI/OVERNIGHT EVENTS:  T(C): 36.8 (12-29-20 @ 06:45), Max: 36.8 (12-29-20 @ 06:45)  HR: 72 (12-29-20 @ 06:45) (59 - 85)  BP: 148/66 (12-29-20 @ 06:45) (138/80 - 148/66)  RR: 18 (12-29-20 @ 06:45) (18 - 18)  SpO2: 95% (12-29-20 @ 06:45) (94% - 95%)  Wt(kg): --  I&O's Summary    28 Dec 2020 07:01  -  29 Dec 2020 07:00  --------------------------------------------------------  IN: 530 mL / OUT: 200 mL / NET: 330 mL    29 Dec 2020 07:01  -  29 Dec 2020 16:58  --------------------------------------------------------  IN: 0 mL / OUT: 300 mL / NET: -300 mL        LABS:                        14.6   9.89  )-----------( 297      ( 29 Dec 2020 06:19 )             45.1     12-29    137  |  106  |  25<H>  ----------------------------<  104<H>  4.3   |  19<L>  |  0.86    Ca    9.1      29 Dec 2020 06:12          CAPILLARY BLOOD GLUCOSE                MEDICATIONS  (STANDING):  lidocaine 2% (Preservative-free) Injectable 10 milliLiter(s) Local Injection Once  senna 1 Tablet(s) Oral daily    MEDICATIONS  (PRN):  polyethylene glycol 3350 17 Gram(s) Oral daily PRN Constipation          PHYSICAL EXAM:  GENERAL: NAD, well-groomed, well-developed  HEAD:  Atraumatic, Normocephalic  CHEST/LUNG: Clear to percussion bilaterally; No rales, rhonchi, wheezing, or rubs  HEART: Regular rate and rhythm; No murmurs, rubs, or gallops  ABDOMEN: Soft, Nontender, Nondistended; Bowel sounds present  EXTREMITIES:  2+ Peripheral Pulses, No clubbing, cyanosis, or edema  LYMPH: No lymphadenopathy noted  SKIN: No rashes or lesions    Care Discussed with Consultants/Other Providers [ ] YES  [ ] NO
Patient is a 89y old  Female who presents with a chief complaint of s/p fall today (30 Dec 2020 07:06)      INTERVAL HPI/OVERNIGHT EVENTS:  T(C): 36.7 (12-30-20 @ 11:33), Max: 36.7 (12-29-20 @ 20:05)  HR: 80 (12-30-20 @ 13:50) (74 - 80)  BP: 162/85 (12-30-20 @ 13:50) (129/69 - 162/85)  RR: 18 (12-30-20 @ 13:50) (18 - 18)  SpO2: 96% (12-30-20 @ 13:50) (94% - 96%)  Wt(kg): --  I&O's Summary    29 Dec 2020 07:01  -  30 Dec 2020 07:00  --------------------------------------------------------  IN: 200 mL / OUT: 300 mL / NET: -100 mL    30 Dec 2020 07:01  -  30 Dec 2020 17:52  --------------------------------------------------------  IN: 480 mL / OUT: 800 mL / NET: -320 mL        LABS:                        13.4   7.62  )-----------( 258      ( 30 Dec 2020 06:32 )             42.9     12-30    140  |  108  |  31<H>  ----------------------------<  99  4.4   |  20<L>  |  1.04    Ca    8.9      30 Dec 2020 06:34          CAPILLARY BLOOD GLUCOSE                MEDICATIONS  (STANDING):  lidocaine 2% (Preservative-free) Injectable 10 milliLiter(s) Local Injection Once  senna 1 Tablet(s) Oral daily    MEDICATIONS  (PRN):  polyethylene glycol 3350 17 Gram(s) Oral daily PRN Constipation          PHYSICAL EXAM:  GENERAL: NAD, well-groomed, well-developed  HEAD:  Atraumatic, Normocephalic  CHEST/LUNG: Clear to percussion bilaterally; No rales, rhonchi, wheezing, or rubs  HEART: Regular rate and rhythm; No murmurs, rubs, or gallops  ABDOMEN: Soft, Nontender, Nondistended; Bowel sounds present  EXTREMITIES:  2+ Peripheral Pulses, No clubbing, cyanosis, or edema  LYMPH: No lymphadenopathy noted  SKIN: No rashes or lesions    Care Discussed with Consultants/Other Providers [ ] YES  [ ] NO
Patient is a 89y old  Female who presents with a chief complaint of s/p fall today (31 Dec 2020 12:33)      INTERVAL HPI/OVERNIGHT EVENTS:  T(C): 36.3 (12-31-20 @ 11:47), Max: 37.3 (12-30-20 @ 19:58)  HR: 66 (12-31-20 @ 11:47) (65 - 75)  BP: 150/85 (12-31-20 @ 11:47) (130/82 - 150/85)  RR: 18 (12-31-20 @ 11:47) (18 - 18)  SpO2: 96% (12-31-20 @ 11:47) (95% - 96%)  Wt(kg): --  I&O's Summary    30 Dec 2020 07:01  -  31 Dec 2020 07:00  --------------------------------------------------------  IN: 480 mL / OUT: 1250 mL / NET: -770 mL    31 Dec 2020 07:01  -  31 Dec 2020 14:58  --------------------------------------------------------  IN: 240 mL / OUT: 0 mL / NET: 240 mL        LABS:                        13.6   6.83  )-----------( 263      ( 31 Dec 2020 06:40 )             42.6     12-31    138  |  106  |  36<H>  ----------------------------<  96  4.5   |  22  |  0.98    Ca    9.1      31 Dec 2020 06:37          CAPILLARY BLOOD GLUCOSE                MEDICATIONS  (STANDING):  lidocaine 2% (Preservative-free) Injectable 10 milliLiter(s) Local Injection Once  senna 1 Tablet(s) Oral daily    MEDICATIONS  (PRN):  polyethylene glycol 3350 17 Gram(s) Oral daily PRN Constipation          PHYSICAL EXAM:  GENERAL: NAD, well-groomed, well-developed  HEAD:  Atraumatic, Normocephalic  CHEST/LUNG: Clear to percussion bilaterally; No rales, rhonchi, wheezing, or rubs  HEART: Regular rate and rhythm; No murmurs, rubs, or gallops  ABDOMEN: Soft, Nontender, Nondistended; Bowel sounds present  EXTREMITIES:  2+ Peripheral Pulses, No clubbing, cyanosis, or edema  LYMPH: No lymphadenopathy noted  SKIN: No rashes or lesions    Care Discussed with Consultants/Other Providers [ ] YES  [ ] NO
Subjective: Patient seen and examined. No new events except as noted.     SUBJECTIVE/ROS:  Due to altered mental status, subjective information were not able to be obtained from the patient. History was obtained, to the extent possible, from review of the chart and collateral sources of information.       MEDICATIONS:  MEDICATIONS  (STANDING):  diltiazem    Tablet 30 milliGRAM(s) Oral two times a day  lidocaine 2% (Preservative-free) Injectable 10 milliLiter(s) Local Injection Once  senna 1 Tablet(s) Oral daily      PHYSICAL EXAM:  T(C): 37.1 (12-26-20 @ 20:43), Max: 37.1 (12-26-20 @ 20:43)  HR: 68 (12-27-20 @ 04:20) (56 - 100)  BP: 135/80 (12-27-20 @ 04:20) (135/80 - 164/90)  RR: 20 (12-26-20 @ 20:43) (18 - 20)  SpO2: 95% (12-26-20 @ 20:43) (95% - 95%)  Wt(kg): --  I&O's Summary    26 Dec 2020 07:01  -  27 Dec 2020 07:00  --------------------------------------------------------  IN: 450 mL / OUT: 575 mL / NET: -125 mL            JVP: Normal  Neck: supple  Lung: clear   CV: S1 S2 , Murmur:  Abd: soft  Ext: No edema  neuro: Awake / alert  Psych: flat affect  Skin: normal``    LABS/DATA:    CARDIAC MARKERS:                                12.8   6.76  )-----------( 236      ( 27 Dec 2020 06:33 )             40.6     12-27    139  |  106  |  26<H>  ----------------------------<  103<H>  4.5   |  20<L>  |  0.97    Ca    9.1      27 Dec 2020 06:33  Phos  3.4     12-25  Mg     1.9     12-27    TPro  6.2  /  Alb  3.7  /  TBili  0.7  /  DBili  x   /  AST  23  /  ALT  21  /  AlkPhos  106  12-25    proBNP:   Lipid Profile:   HgA1c:   TSH:     TELE:  EKG:        
Subjective: Patient seen and examined. No new events except as noted.     SUBJECTIVE/ROS:  resting       MEDICATIONS:  MEDICATIONS  (STANDING):  lidocaine 2% (Preservative-free) Injectable 10 milliLiter(s) Local Injection Once  senna 1 Tablet(s) Oral daily      PHYSICAL EXAM:  T(C): 36.6 (12-31-20 @ 04:41), Max: 37.3 (12-30-20 @ 19:58)  HR: 65 (12-31-20 @ 04:41) (65 - 80)  BP: 130/82 (12-31-20 @ 04:41) (130/82 - 162/85)  RR: 18 (12-31-20 @ 04:41) (18 - 18)  SpO2: 96% (12-31-20 @ 04:41) (94% - 96%)  Wt(kg): --  I&O's Summary    30 Dec 2020 07:01  -  31 Dec 2020 07:00  --------------------------------------------------------  IN: 480 mL / OUT: 1250 mL / NET: -770 mL            JVP: Normal  Neck: supple  Lung: clear   CV: S1 S2 , Murmur:  Abd: soft  Ext: No edema  neuro: Awake / alert  Psych: flat affect  Skin: normal``    LABS/DATA:    CARDIAC MARKERS:                                13.4   7.62  )-----------( 258      ( 30 Dec 2020 06:32 )             42.9     12-30    140  |  108  |  31<H>  ----------------------------<  99  4.4   |  20<L>  |  1.04    Ca    8.9      30 Dec 2020 06:34      proBNP:   Lipid Profile:   HgA1c:   TSH:     TELE:  EKG:

## 2020-12-31 NOTE — DISCHARGE NOTE PROVIDER - NSDCMRMEDTOKEN_GEN_ALL_CORE_FT
AZO Urinary Pain Relief 95 mg oral tablet: 2 tab(s) orally , As Needed  omeprazole 20 mg oral delayed release capsule: 1 cap(s) orally once a day  Tums 500 mg oral tablet, chewable: 1 tab(s) orally , As Needed

## 2020-12-31 NOTE — PROGRESS NOTE ADULT - PROVIDER SPECIALTY LIST ADULT
Hospitalist
Neurology
Cardiology
Hospitalist
Cardiology
Cardiology
Hospitalist

## 2020-12-31 NOTE — PROGRESS NOTE ADULT - ASSESSMENT
Fall Plan monitor orthostatics  neuro and cards fu   check echo  will monitor   check LE doppler     Finger injury, unspecified laterality, initial encounter.  Plan: Left 4 th finger fracture s/p fall -status post reduction of the left fourth proximal phalanx fracture with improved alignment and mild residual volar angulation of the fracture apex.      Gastroesophageal reflux disease without esophagitis.  Plan: PPI.
Fall Plan monitor orthostatics  neuro and cards fu   will monitor   check carotid dopplers     Finger injury, unspecified laterality, initial encounter.  Plan: Left 4 th finger fracture s/p fall -status post reduction of the left fourth proximal phalanx fracture with improved alignment and mild residual volar angulation of the fracture apex.      Gastroesophageal reflux disease without esophagitis.  Plan: PPI.
Fall Plan monitor orthostatics  neuro and cards fu   will monitor   stable    Finger injury, unspecified laterality, initial encounter.  Plan: Left 4 th finger fracture s/p fall -status post reduction of the left fourth proximal phalanx fracture with improved alignment and mild residual volar angulation of the fracture apex.      Gastroesophageal reflux disease without esophagitis.  Plan: PPI.    dc planning today  case dw daughter   
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 
S/p Fall  rule out syncope  monitor on tele  Echo unremarkable   fu with neurology   monitor Orthostatic vitals    Ventricular trigeminy   appears to be outflow tract focus  off cardizem due to junctional rhythm     PAT  off Cardizem   normal lower ext venous doppler     HTN  stable     
    Fall Plan monitor orthostatics  neuro and cards fu   check echo  will monitor     Finger injury, unspecified laterality, initial encounter.  Plan: Left 4 th finger fracture s/p fall -status post reduction of the left fourth proximal phalanx fracture with improved alignment and mild residual volar angulation of the fracture apex.      Gastroesophageal reflux disease without esophagitis.  Plan: PPI. 
Fall Plan monitor orthostatics  neuro and cards fu   will monitor   check carotid dopplers     Finger injury, unspecified laterality, initial encounter.  Plan: Left 4 th finger fracture s/p fall -status post reduction of the left fourth proximal phalanx fracture with improved alignment and mild residual volar angulation of the fracture apex.      Gastroesophageal reflux disease without esophagitis.  Plan: PPI.
S/p Fall  rule out syncope  monitor on tele  Echo   consider carotid doppler   neurology eval   monitor Orthostatic vitals    Ventricular trigeminy   appears to be outflow tract focus  will DC Cardizem due to junctional bradycardia   Echo    PAT  off Cardizem   check lower ext venous doppler     HTN  plan as above    Murmur  echo 
S/p Fall  rule out syncope  monitor on tele  Echo unremarkable   fu with neurology   monitor Orthostatic vitals    Ventricular trigeminy   appears to be outflow tract focus  off cardizem due to junctional rhythm     PAT  off Cardizem   normal lower ext venous doppler     HTN  stable     
Fall Plan monitor orthostatics  neuro and cards fu   check echo  will monitor   check LE doppler     Finger injury, unspecified laterality, initial encounter.  Plan: Left 4 th finger fracture s/p fall -status post reduction of the left fourth proximal phalanx fracture with improved alignment and mild residual volar angulation of the fracture apex.      Gastroesophageal reflux disease without esophagitis.  Plan: PPI.
S/p Fall  rule out syncope  monitor on tele  Echo unremarkable   consider carotid doppler   neurology eval   monitor Orthostatic vitals    Ventricular trigeminy   appears to be outflow tract focus  off  Cardizem due to junctional bradycardia r    PAT  off Cardizem   normal lower ext venous doppler     HTN  plan as above

## 2020-12-31 NOTE — DIETITIAN INITIAL EVALUATION ADULT. - ADD RECOMMEND
1. Continue to provide current diet order, no therapeutic diet restrictions indicated at this time. Consider Soft diet texture to promote tolerance 2. Provide 1 ensure enlive daily 3. Will continue to monitor nutrient intake, wt, labs, f/u per protocol

## 2020-12-31 NOTE — DIETITIAN INITIAL EVALUATION ADULT. - PERTINENT MEDS FT
MEDICATIONS  (STANDING):  lidocaine 2% (Preservative-free) Injectable 10 milliLiter(s) Local Injection Once  senna 1 Tablet(s) Oral daily    MEDICATIONS  (PRN):  polyethylene glycol 3350 17 Gram(s) Oral daily PRN Constipation

## 2021-01-07 ENCOUNTER — APPOINTMENT (OUTPATIENT)
Dept: ORTHOPEDIC SURGERY | Facility: CLINIC | Age: 86
End: 2021-01-07
Payer: MEDICARE

## 2021-01-07 DIAGNOSIS — S62.615D DISPLACED FRACTURE OF PROXIMAL PHALANX OF LEFT RING FINGER, SUBSEQUENT ENCOUNTER FOR FRACTURE WITH ROUTINE HEALING: ICD-10-CM

## 2021-01-07 PROCEDURE — 99442: CPT | Mod: 95

## 2021-01-20 PROCEDURE — 84443 ASSAY THYROID STIM HORMONE: CPT

## 2021-01-20 PROCEDURE — 87186 SC STD MICRODIL/AGAR DIL: CPT

## 2021-01-20 PROCEDURE — 87635 SARS-COV-2 COVID-19 AMP PRB: CPT

## 2021-01-20 PROCEDURE — 70450 CT HEAD/BRAIN W/O DYE: CPT

## 2021-01-20 PROCEDURE — 83735 ASSAY OF MAGNESIUM: CPT

## 2021-01-20 PROCEDURE — 97116 GAIT TRAINING THERAPY: CPT

## 2021-01-20 PROCEDURE — 93970 EXTREMITY STUDY: CPT

## 2021-01-20 PROCEDURE — 81001 URINALYSIS AUTO W/SCOPE: CPT

## 2021-01-20 PROCEDURE — 86769 SARS-COV-2 COVID-19 ANTIBODY: CPT

## 2021-01-20 PROCEDURE — 97161 PT EVAL LOW COMPLEX 20 MIN: CPT

## 2021-01-20 PROCEDURE — 85027 COMPLETE CBC AUTOMATED: CPT

## 2021-01-20 PROCEDURE — 85730 THROMBOPLASTIN TIME PARTIAL: CPT

## 2021-01-20 PROCEDURE — 85610 PROTHROMBIN TIME: CPT

## 2021-01-20 PROCEDURE — 73130 X-RAY EXAM OF HAND: CPT

## 2021-01-20 PROCEDURE — 97110 THERAPEUTIC EXERCISES: CPT

## 2021-01-20 PROCEDURE — 87086 URINE CULTURE/COLONY COUNT: CPT

## 2021-01-20 PROCEDURE — 93306 TTE W/DOPPLER COMPLETE: CPT

## 2021-01-20 PROCEDURE — 73030 X-RAY EXAM OF SHOULDER: CPT

## 2021-01-20 PROCEDURE — 84484 ASSAY OF TROPONIN QUANT: CPT

## 2021-01-20 PROCEDURE — 80053 COMPREHEN METABOLIC PANEL: CPT

## 2021-01-20 PROCEDURE — 82607 VITAMIN B-12: CPT

## 2021-01-20 PROCEDURE — 82746 ASSAY OF FOLIC ACID SERUM: CPT

## 2021-01-20 PROCEDURE — 85025 COMPLETE CBC W/AUTO DIFF WBC: CPT

## 2021-01-20 PROCEDURE — 93880 EXTRACRANIAL BILAT STUDY: CPT

## 2021-01-20 PROCEDURE — 72170 X-RAY EXAM OF PELVIS: CPT

## 2021-01-20 PROCEDURE — 80048 BASIC METABOLIC PNL TOTAL CA: CPT

## 2021-01-20 PROCEDURE — 71045 X-RAY EXAM CHEST 1 VIEW: CPT

## 2021-01-20 PROCEDURE — 84100 ASSAY OF PHOSPHORUS: CPT

## 2021-03-03 NOTE — DISCHARGE NOTE NURSING/CASE MANAGEMENT/SOCIAL WORK - MODE OF TRANSPORTATION
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 His original right TKR was on 9/8/2020. He reports mild postoperative pain. He has continued attending outpatient PT and is doing much better. He is thrilled with his progress and result. He has been working on losing weight and has lost 30 pounds. Of note, He is also s/p left TKR done 9/2015 and is thrilled with his result. He is currently working.\par \par Radiology Results done 12/16/2020:\par o Right Knee : Standing AP, Lateral and skyline views of the knee were obtained and reveal excellent position of the total knee replacement with central tracking of the patella.  [place]

## 2021-09-20 NOTE — DIETITIAN INITIAL EVALUATION ADULT. - NUTRITION DIAGNOSIS
TRIED CALLING PATIENT SEVERAL TIMES NO ANSWER AND COULD NOT LEAVE VOICE MAIL FOR PATIENT TO GET MD REFERRAL FOR US TO SEE HER.    ALSO CALLED MD LISTED ON CARD AND THEY ARE NOT THE PRIMARY CARE DOCTOR.   WILL TRY CALLING PATIENT AGAIN.  
yes...

## 2021-12-26 ENCOUNTER — INPATIENT (INPATIENT)
Facility: HOSPITAL | Age: 86
LOS: 9 days | Discharge: HOME | DRG: 871 | End: 2022-01-05
Attending: INTERNAL MEDICINE | Admitting: INTERNAL MEDICINE
Payer: MEDICARE

## 2021-12-26 VITALS
DIASTOLIC BLOOD PRESSURE: 76 MMHG | OXYGEN SATURATION: 94 % | WEIGHT: 100.09 LBS | SYSTOLIC BLOOD PRESSURE: 118 MMHG | HEIGHT: 62 IN | RESPIRATION RATE: 20 BRPM | HEART RATE: 74 BPM | TEMPERATURE: 97 F

## 2021-12-26 DIAGNOSIS — I21.4 NON-ST ELEVATION (NSTEMI) MYOCARDIAL INFARCTION: ICD-10-CM

## 2021-12-26 DIAGNOSIS — Z29.9 ENCOUNTER FOR PROPHYLACTIC MEASURES, UNSPECIFIED: ICD-10-CM

## 2021-12-26 DIAGNOSIS — R77.8 OTHER SPECIFIED ABNORMALITIES OF PLASMA PROTEINS: ICD-10-CM

## 2021-12-26 DIAGNOSIS — G93.40 ENCEPHALOPATHY, UNSPECIFIED: ICD-10-CM

## 2021-12-26 DIAGNOSIS — N39.0 URINARY TRACT INFECTION, SITE NOT SPECIFIED: ICD-10-CM

## 2021-12-26 DIAGNOSIS — M54.9 DORSALGIA, UNSPECIFIED: ICD-10-CM

## 2021-12-26 DIAGNOSIS — N17.9 ACUTE KIDNEY FAILURE, UNSPECIFIED: ICD-10-CM

## 2021-12-26 LAB
ALBUMIN SERPL ELPH-MCNC: 2.5 G/DL — LOW (ref 3.5–5)
ALP SERPL-CCNC: 164 U/L — HIGH (ref 40–120)
ALT FLD-CCNC: 59 U/L DA — SIGNIFICANT CHANGE UP (ref 10–60)
ANION GAP SERPL CALC-SCNC: 7 MMOL/L — SIGNIFICANT CHANGE UP (ref 5–17)
APPEARANCE UR: ABNORMAL
APTT BLD: 25.8 SEC — LOW (ref 27.5–35.5)
AST SERPL-CCNC: 81 U/L — HIGH (ref 10–40)
BASOPHILS # BLD AUTO: 0.08 K/UL — SIGNIFICANT CHANGE UP (ref 0–0.2)
BASOPHILS NFR BLD AUTO: 1 % — SIGNIFICANT CHANGE UP (ref 0–2)
BILIRUB SERPL-MCNC: 1.3 MG/DL — HIGH (ref 0.2–1.2)
BILIRUB UR-MCNC: NEGATIVE — SIGNIFICANT CHANGE UP
BUN SERPL-MCNC: 38 MG/DL — HIGH (ref 7–18)
CALCIUM SERPL-MCNC: 9.6 MG/DL — SIGNIFICANT CHANGE UP (ref 8.4–10.5)
CHLORIDE SERPL-SCNC: 116 MMOL/L — HIGH (ref 96–108)
CK SERPL-CCNC: 95 U/L — SIGNIFICANT CHANGE UP (ref 21–215)
CO2 SERPL-SCNC: 23 MMOL/L — SIGNIFICANT CHANGE UP (ref 22–31)
COLOR SPEC: ABNORMAL
CREAT SERPL-MCNC: 1.65 MG/DL — HIGH (ref 0.5–1.3)
DIFF PNL FLD: ABNORMAL
EOSINOPHIL # BLD AUTO: 0 K/UL — SIGNIFICANT CHANGE UP (ref 0–0.5)
EOSINOPHIL NFR BLD AUTO: 0 % — SIGNIFICANT CHANGE UP (ref 0–6)
GLUCOSE SERPL-MCNC: 122 MG/DL — HIGH (ref 70–99)
GLUCOSE UR QL: NEGATIVE — SIGNIFICANT CHANGE UP
HCT VFR BLD CALC: 40.6 % — SIGNIFICANT CHANGE UP (ref 34.5–45)
HGB BLD-MCNC: 12.8 G/DL — SIGNIFICANT CHANGE UP (ref 11.5–15.5)
INR BLD: 1.03 RATIO — SIGNIFICANT CHANGE UP (ref 0.88–1.16)
KETONES UR-MCNC: NEGATIVE — SIGNIFICANT CHANGE UP
LEUKOCYTE ESTERASE UR-ACNC: ABNORMAL
LYMPHOCYTES # BLD AUTO: 0.08 K/UL — LOW (ref 1–3.3)
LYMPHOCYTES # BLD AUTO: 1 % — LOW (ref 13–44)
MCHC RBC-ENTMCNC: 30.8 PG — SIGNIFICANT CHANGE UP (ref 27–34)
MCHC RBC-ENTMCNC: 31.5 GM/DL — LOW (ref 32–36)
MCV RBC AUTO: 97.6 FL — SIGNIFICANT CHANGE UP (ref 80–100)
MONOCYTES # BLD AUTO: 0 K/UL — SIGNIFICANT CHANGE UP (ref 0–0.9)
MONOCYTES NFR BLD AUTO: 0 % — LOW (ref 2–14)
NEUTROPHILS # BLD AUTO: 7.98 K/UL — HIGH (ref 1.8–7.4)
NEUTROPHILS NFR BLD AUTO: 94 % — HIGH (ref 43–77)
NITRITE UR-MCNC: POSITIVE
PH UR: 7 — SIGNIFICANT CHANGE UP (ref 5–8)
PLATELET # BLD AUTO: 207 K/UL — SIGNIFICANT CHANGE UP (ref 150–400)
POTASSIUM SERPL-MCNC: 4.3 MMOL/L — SIGNIFICANT CHANGE UP (ref 3.5–5.3)
POTASSIUM SERPL-SCNC: 4.3 MMOL/L — SIGNIFICANT CHANGE UP (ref 3.5–5.3)
PROT SERPL-MCNC: 6.2 G/DL — SIGNIFICANT CHANGE UP (ref 6–8.3)
PROT UR-MCNC: 100
PROTHROM AB SERPL-ACNC: 12.2 SEC — SIGNIFICANT CHANGE UP (ref 10.6–13.6)
RBC # BLD: 4.16 M/UL — SIGNIFICANT CHANGE UP (ref 3.8–5.2)
RBC # FLD: 14.9 % — HIGH (ref 10.3–14.5)
SARS-COV-2 RNA SPEC QL NAA+PROBE: SIGNIFICANT CHANGE UP
SODIUM SERPL-SCNC: 146 MMOL/L — HIGH (ref 135–145)
SP GR SPEC: 1.01 — SIGNIFICANT CHANGE UP (ref 1.01–1.02)
TROPONIN I, HIGH SENSITIVITY RESULT: 93.3 NG/L — HIGH
UROBILINOGEN FLD QL: NEGATIVE — SIGNIFICANT CHANGE UP
WBC # BLD: 8.23 K/UL — SIGNIFICANT CHANGE UP (ref 3.8–10.5)
WBC # FLD AUTO: 8.23 K/UL — SIGNIFICANT CHANGE UP (ref 3.8–10.5)

## 2021-12-26 PROCEDURE — 71045 X-RAY EXAM CHEST 1 VIEW: CPT | Mod: 26

## 2021-12-26 PROCEDURE — 93010 ELECTROCARDIOGRAM REPORT: CPT

## 2021-12-26 PROCEDURE — 99285 EMERGENCY DEPT VISIT HI MDM: CPT

## 2021-12-26 RX ORDER — TRAMADOL HYDROCHLORIDE 50 MG/1
50 TABLET ORAL ONCE
Refills: 0 | Status: DISCONTINUED | OUTPATIENT
Start: 2021-12-26 | End: 2021-12-26

## 2021-12-26 RX ORDER — ACETAMINOPHEN 500 MG
650 TABLET ORAL EVERY 6 HOURS
Refills: 0 | Status: DISCONTINUED | OUTPATIENT
Start: 2021-12-26 | End: 2022-01-05

## 2021-12-26 RX ORDER — LANOLIN ALCOHOL/MO/W.PET/CERES
5 CREAM (GRAM) TOPICAL AT BEDTIME
Refills: 0 | Status: COMPLETED | OUTPATIENT
Start: 2021-12-26 | End: 2021-12-27

## 2021-12-26 RX ORDER — SODIUM CHLORIDE 9 MG/ML
1000 INJECTION INTRAMUSCULAR; INTRAVENOUS; SUBCUTANEOUS
Refills: 0 | Status: DISCONTINUED | OUTPATIENT
Start: 2021-12-26 | End: 2021-12-28

## 2021-12-26 RX ORDER — PANTOPRAZOLE SODIUM 20 MG/1
40 TABLET, DELAYED RELEASE ORAL AT BEDTIME
Refills: 0 | Status: DISCONTINUED | OUTPATIENT
Start: 2021-12-26 | End: 2022-01-05

## 2021-12-26 RX ORDER — HEPARIN SODIUM 5000 [USP'U]/ML
5000 INJECTION INTRAVENOUS; SUBCUTANEOUS EVERY 8 HOURS
Refills: 0 | Status: DISCONTINUED | OUTPATIENT
Start: 2021-12-26 | End: 2021-12-27

## 2021-12-26 RX ORDER — SODIUM CHLORIDE 9 MG/ML
500 INJECTION INTRAMUSCULAR; INTRAVENOUS; SUBCUTANEOUS ONCE
Refills: 0 | Status: COMPLETED | OUTPATIENT
Start: 2021-12-26 | End: 2021-12-26

## 2021-12-26 RX ADMIN — SODIUM CHLORIDE 500 MILLILITER(S): 9 INJECTION INTRAMUSCULAR; INTRAVENOUS; SUBCUTANEOUS at 22:00

## 2021-12-26 RX ADMIN — PANTOPRAZOLE SODIUM 40 MILLIGRAM(S): 20 TABLET, DELAYED RELEASE ORAL at 23:26

## 2021-12-26 RX ADMIN — Medication 1 MILLIGRAM(S): at 23:33

## 2021-12-26 RX ADMIN — HEPARIN SODIUM 5000 UNIT(S): 5000 INJECTION INTRAVENOUS; SUBCUTANEOUS at 23:25

## 2021-12-26 RX ADMIN — SODIUM CHLORIDE 500 MILLILITER(S): 9 INJECTION INTRAMUSCULAR; INTRAVENOUS; SUBCUTANEOUS at 20:11

## 2021-12-26 NOTE — H&P ADULT - PROBLEM SELECTOR PLAN 1
- Patient with mild dementia at baseline presented with AMS  - Could be infectious vs advanced dementia   - UA is positive   - Will start her on rocephin IV for now   - Pending urine culture   - Will get dysphagia screen; If tolerated we can start on puree diet   - Fall precautions   - Aspiration precautions

## 2021-12-26 NOTE — H&P ADULT - HISTORY OF PRESENT ILLNESS
Patient is 89 years old female ( on wheel chair ) with past history of Right arthroplasty, chronic back pain, previous episodes of UTIs presented today to ED with AMS. Patient is very poor historian, so history was obtained by daughter. Patient was admitted before to [facility] for previous UTIs. Patient was seen by neurologist before for her dementia and she was told that is normal for age. Patient is taking overcounter meds for her back pain and heartburn. She mentioned that she was taking lasix for her fluid retention but stopped few months ago. The daughter wants her DNR/DNI.

## 2021-12-26 NOTE — H&P ADULT - ASSESSMENT
Patient is 89 years old female ( on wheel chair ) with past history of Right arthroplasty, chronic back pain, previous episodes of UTIs presented today to ED with AMS. Patient was found to have positive UA. mildy elevated troponin probably 2/2 poor oral intake. Patient will be admitted for further care.

## 2021-12-26 NOTE — ED ADULT NURSE NOTE - NSFALLRSKHARMRISK_ED_ALL_ED
yes Pt is an 84yoM w/hx of anemia sent in from [facility] for 2.5mo of chest pain w/radiation to back. VSS, phys exam WNL. Concern for cardiac etiology, will order CBC, CMP. tropes, BNP, ecg/cxr, pain ctrl and reassess. - Lobo Laguna, PGY-1

## 2021-12-26 NOTE — ED PROVIDER NOTE - OBJECTIVE STATEMENT
PT from home with dementia, pvd here with agitation and complaint of back pain today. Patient may have lifted a heavy lamp yesterday (patient is bed and wheelchair bound. no fall. Patient also c/o chronically of burning with urination. daughter notes that patient has chronically bluish and cool feet. Patient has not seen a primary care physician in a while because she is in and out of the Emergency Department for falls and has difficulty getting to doctor. PT has been agitated and having poor sleep

## 2021-12-26 NOTE — ED PROVIDER NOTE - CLINICAL SUMMARY MEDICAL DECISION MAKING FREE TEXT BOX
PT with agitation. labs reveals elevated troponin and agitation. CT done, results pending. admit to tele

## 2021-12-26 NOTE — ED PROVIDER NOTE - PHYSICAL EXAMINATION
palpable pulses in feet but both feet are dusky and somewhat cool. chronic small ulcerations on toes

## 2021-12-26 NOTE — H&P ADULT - PROBLEM SELECTOR PLAN 3
- EKG showed T wave abnormalities; NSTEMI  - Patient has poor oral intake; Will start her on IV fluids 0.9% NS 70cc/hr   - Will repeat another troponin AM

## 2021-12-26 NOTE — H&P ADULT - NSHPPHYSICALEXAM_GEN_ALL_CORE
ICU Vital Signs Last 24 Hrs  T(C): 36.3 (26 Dec 2021 14:36), Max: 36.3 (26 Dec 2021 14:36)  T(F): 97.3 (26 Dec 2021 14:36), Max: 97.3 (26 Dec 2021 14:36)  HR: 74 (26 Dec 2021 14:36) (74 - 74)  BP: 118/76 (26 Dec 2021 14:36) (118/76 - 118/76)) (20 - 20)  SpO2: 94% (26 Dec 2021 14:36) (94% - 94%)    GENERAL: NAD, lying in bed comfortably, poor historian   HEAD:  Atraumatic, Normocephalic  EYES: EOMI, PERRLA, conjunctiva and sclera clear  ENT: Dry mucous membranes, dental cares   NECK: Supple, No JVD  CHEST/LUNG: Diminshed breath sound bilaterally   HEART: Regular rate and rhythm; No murmurs, rubs, or gallops  ABDOMEN: Bowel sounds present; Soft, Nontender, Nondistended. No hepatomegaly  EXTREMITIES:  Swollen bilateral legs with senile dermatitis Faint peripheral pulse bilaterally   NERVOUS SYSTEM:  Alert & Oriented X0-1, speech clear. No deficits   MSK: FROM all 4 extremities, full and equal strength  SKIN: No rashes or lesions

## 2021-12-27 LAB
ALBUMIN SERPL ELPH-MCNC: 1.8 G/DL — LOW (ref 3.5–5)
ALBUMIN SERPL ELPH-MCNC: 1.9 G/DL — LOW (ref 3.5–5)
ALP SERPL-CCNC: 102 U/L — SIGNIFICANT CHANGE UP (ref 40–120)
ALP SERPL-CCNC: 104 U/L — SIGNIFICANT CHANGE UP (ref 40–120)
ALT FLD-CCNC: 39 U/L DA — SIGNIFICANT CHANGE UP (ref 10–60)
ALT FLD-CCNC: 44 U/L DA — SIGNIFICANT CHANGE UP (ref 10–60)
ANION GAP SERPL CALC-SCNC: 4 MMOL/L — LOW (ref 5–17)
ANION GAP SERPL CALC-SCNC: 8 MMOL/L — SIGNIFICANT CHANGE UP (ref 5–17)
ANISOCYTOSIS BLD QL: SLIGHT — SIGNIFICANT CHANGE UP
APTT BLD: 29.6 SEC — SIGNIFICANT CHANGE UP (ref 27.5–35.5)
AST SERPL-CCNC: 38 U/L — SIGNIFICANT CHANGE UP (ref 10–40)
AST SERPL-CCNC: 44 U/L — HIGH (ref 10–40)
BASOPHILS # BLD AUTO: 0 K/UL — SIGNIFICANT CHANGE UP (ref 0–0.2)
BASOPHILS NFR BLD AUTO: 0 % — SIGNIFICANT CHANGE UP (ref 0–2)
BILIRUB SERPL-MCNC: 0.8 MG/DL — SIGNIFICANT CHANGE UP (ref 0.2–1.2)
BILIRUB SERPL-MCNC: 0.9 MG/DL — SIGNIFICANT CHANGE UP (ref 0.2–1.2)
BUN SERPL-MCNC: 40 MG/DL — HIGH (ref 7–18)
BUN SERPL-MCNC: 47 MG/DL — HIGH (ref 7–18)
BURR CELLS BLD QL SMEAR: PRESENT — SIGNIFICANT CHANGE UP
CALCIUM SERPL-MCNC: 8.2 MG/DL — LOW (ref 8.4–10.5)
CALCIUM SERPL-MCNC: 8.3 MG/DL — LOW (ref 8.4–10.5)
CHLORIDE SERPL-SCNC: 120 MMOL/L — HIGH (ref 96–108)
CHLORIDE SERPL-SCNC: 122 MMOL/L — HIGH (ref 96–108)
CO2 SERPL-SCNC: 17 MMOL/L — LOW (ref 22–31)
CO2 SERPL-SCNC: 23 MMOL/L — SIGNIFICANT CHANGE UP (ref 22–31)
CREAT ?TM UR-MCNC: 122 MG/DL — SIGNIFICANT CHANGE UP
CREAT SERPL-MCNC: 1.58 MG/DL — HIGH (ref 0.5–1.3)
CREAT SERPL-MCNC: 1.9 MG/DL — HIGH (ref 0.5–1.3)
D DIMER BLD IA.RAPID-MCNC: 3360 NG/ML DDU — HIGH
EOSINOPHIL # BLD AUTO: 0 K/UL — SIGNIFICANT CHANGE UP (ref 0–0.5)
EOSINOPHIL NFR BLD AUTO: 0 % — SIGNIFICANT CHANGE UP (ref 0–6)
GLUCOSE BLDC GLUCOMTR-MCNC: 80 MG/DL — SIGNIFICANT CHANGE UP (ref 70–99)
GLUCOSE SERPL-MCNC: 72 MG/DL — SIGNIFICANT CHANGE UP (ref 70–99)
GLUCOSE SERPL-MCNC: 83 MG/DL — SIGNIFICANT CHANGE UP (ref 70–99)
HCT VFR BLD CALC: 33.8 % — LOW (ref 34.5–45)
HCT VFR BLD CALC: 34.3 % — LOW (ref 34.5–45)
HGB BLD-MCNC: 10.8 G/DL — LOW (ref 11.5–15.5)
HGB BLD-MCNC: 11.2 G/DL — LOW (ref 11.5–15.5)
HYPOCHROMIA BLD QL: SLIGHT — SIGNIFICANT CHANGE UP
INR BLD: 1.05 RATIO — SIGNIFICANT CHANGE UP (ref 0.88–1.16)
LACTATE SERPL-SCNC: 1.8 MMOL/L — SIGNIFICANT CHANGE UP (ref 0.7–2)
LACTATE SERPL-SCNC: 3.1 MMOL/L — HIGH (ref 0.7–2)
LYMPHOCYTES # BLD AUTO: 0 % — LOW (ref 13–44)
LYMPHOCYTES # BLD AUTO: 0 K/UL — LOW (ref 1–3.3)
MAGNESIUM SERPL-MCNC: 1.7 MG/DL — SIGNIFICANT CHANGE UP (ref 1.6–2.6)
MANUAL SMEAR VERIFICATION: SIGNIFICANT CHANGE UP
MCHC RBC-ENTMCNC: 30.6 PG — SIGNIFICANT CHANGE UP (ref 27–34)
MCHC RBC-ENTMCNC: 31.6 PG — SIGNIFICANT CHANGE UP (ref 27–34)
MCHC RBC-ENTMCNC: 32 GM/DL — SIGNIFICANT CHANGE UP (ref 32–36)
MCHC RBC-ENTMCNC: 32.7 GM/DL — SIGNIFICANT CHANGE UP (ref 32–36)
MCV RBC AUTO: 95.8 FL — SIGNIFICANT CHANGE UP (ref 80–100)
MCV RBC AUTO: 96.9 FL — SIGNIFICANT CHANGE UP (ref 80–100)
MONOCYTES # BLD AUTO: 0.35 K/UL — SIGNIFICANT CHANGE UP (ref 0–0.9)
MONOCYTES NFR BLD AUTO: 2 % — SIGNIFICANT CHANGE UP (ref 2–14)
MYELOCYTES NFR BLD: 3 % — HIGH (ref 0–0)
NEUTROPHILS # BLD AUTO: 16.78 K/UL — HIGH (ref 1.8–7.4)
NEUTROPHILS NFR BLD AUTO: 79 % — HIGH (ref 43–77)
NEUTS BAND # BLD: 16 % — HIGH (ref 0–8)
NRBC # BLD: 0 /100 WBCS — SIGNIFICANT CHANGE UP (ref 0–0)
NRBC # BLD: 0 /100 — SIGNIFICANT CHANGE UP (ref 0–0)
OSMOLALITY UR: 432 MOS/KG — SIGNIFICANT CHANGE UP (ref 50–1200)
OVALOCYTES BLD QL SMEAR: SLIGHT — SIGNIFICANT CHANGE UP
PHOSPHATE SERPL-MCNC: 2.5 MG/DL — SIGNIFICANT CHANGE UP (ref 2.5–4.5)
PLAT MORPH BLD: NORMAL — SIGNIFICANT CHANGE UP
PLATELET # BLD AUTO: 150 K/UL — SIGNIFICANT CHANGE UP (ref 150–400)
PLATELET # BLD AUTO: 160 K/UL — SIGNIFICANT CHANGE UP (ref 150–400)
PLATELET COUNT - ESTIMATE: NORMAL — SIGNIFICANT CHANGE UP
POIKILOCYTOSIS BLD QL AUTO: SLIGHT — SIGNIFICANT CHANGE UP
POLYCHROMASIA BLD QL SMEAR: SLIGHT — SIGNIFICANT CHANGE UP
POTASSIUM SERPL-MCNC: 4 MMOL/L — SIGNIFICANT CHANGE UP (ref 3.5–5.3)
POTASSIUM SERPL-MCNC: 4.2 MMOL/L — SIGNIFICANT CHANGE UP (ref 3.5–5.3)
POTASSIUM SERPL-SCNC: 4 MMOL/L — SIGNIFICANT CHANGE UP (ref 3.5–5.3)
POTASSIUM SERPL-SCNC: 4.2 MMOL/L — SIGNIFICANT CHANGE UP (ref 3.5–5.3)
PROT SERPL-MCNC: 5.3 G/DL — LOW (ref 6–8.3)
PROT SERPL-MCNC: 5.3 G/DL — LOW (ref 6–8.3)
PROTHROM AB SERPL-ACNC: 12.5 SEC — SIGNIFICANT CHANGE UP (ref 10.6–13.6)
RBC # BLD: 3.53 M/UL — LOW (ref 3.8–5.2)
RBC # BLD: 3.54 M/UL — LOW (ref 3.8–5.2)
RBC # FLD: 15 % — HIGH (ref 10.3–14.5)
RBC # FLD: 15.3 % — HIGH (ref 10.3–14.5)
RBC BLD AUTO: ABNORMAL
SCHISTOCYTES BLD QL AUTO: SLIGHT — SIGNIFICANT CHANGE UP
SODIUM SERPL-SCNC: 147 MMOL/L — HIGH (ref 135–145)
SODIUM SERPL-SCNC: 147 MMOL/L — HIGH (ref 135–145)
SODIUM UR-SCNC: 57 MMOL/L — SIGNIFICANT CHANGE UP
TROPONIN I, HIGH SENSITIVITY RESULT: 335.8 NG/L — HIGH
TROPONIN I, HIGH SENSITIVITY RESULT: 395.6 NG/L — HIGH
WBC # BLD: 17.66 K/UL — HIGH (ref 3.8–10.5)
WBC # BLD: 18.34 K/UL — HIGH (ref 3.8–10.5)
WBC # FLD AUTO: 17.66 K/UL — HIGH (ref 3.8–10.5)
WBC # FLD AUTO: 18.34 K/UL — HIGH (ref 3.8–10.5)

## 2021-12-27 PROCEDURE — 93010 ELECTROCARDIOGRAM REPORT: CPT

## 2021-12-27 RX ORDER — CEFEPIME 1 G/1
500 INJECTION, POWDER, FOR SOLUTION INTRAMUSCULAR; INTRAVENOUS ONCE
Refills: 0 | Status: COMPLETED | OUTPATIENT
Start: 2021-12-27 | End: 2021-12-27

## 2021-12-27 RX ORDER — SODIUM CHLORIDE 9 MG/ML
1000 INJECTION INTRAMUSCULAR; INTRAVENOUS; SUBCUTANEOUS ONCE
Refills: 0 | Status: COMPLETED | OUTPATIENT
Start: 2021-12-27 | End: 2021-12-27

## 2021-12-27 RX ORDER — VANCOMYCIN HCL 1 G
500 VIAL (EA) INTRAVENOUS ONCE
Refills: 0 | Status: COMPLETED | OUTPATIENT
Start: 2021-12-27 | End: 2021-12-27

## 2021-12-27 RX ORDER — CEFEPIME 1 G/1
INJECTION, POWDER, FOR SOLUTION INTRAMUSCULAR; INTRAVENOUS
Refills: 0 | Status: DISCONTINUED | OUTPATIENT
Start: 2021-12-27 | End: 2021-12-27

## 2021-12-27 RX ORDER — CEFEPIME 1 G/1
INJECTION, POWDER, FOR SOLUTION INTRAMUSCULAR; INTRAVENOUS
Refills: 0 | Status: DISCONTINUED | OUTPATIENT
Start: 2021-12-27 | End: 2022-01-05

## 2021-12-27 RX ORDER — SODIUM CHLORIDE 9 MG/ML
500 INJECTION INTRAMUSCULAR; INTRAVENOUS; SUBCUTANEOUS ONCE
Refills: 0 | Status: COMPLETED | OUTPATIENT
Start: 2021-12-27 | End: 2021-12-27

## 2021-12-27 RX ORDER — ACETAMINOPHEN 500 MG
2 TABLET ORAL
Qty: 0 | Refills: 0 | DISCHARGE

## 2021-12-27 RX ORDER — CEFEPIME 1 G/1
500 INJECTION, POWDER, FOR SOLUTION INTRAMUSCULAR; INTRAVENOUS EVERY 24 HOURS
Refills: 0 | Status: DISCONTINUED | OUTPATIENT
Start: 2021-12-28 | End: 2022-01-05

## 2021-12-27 RX ORDER — CEFTRIAXONE 500 MG/1
1000 INJECTION, POWDER, FOR SOLUTION INTRAMUSCULAR; INTRAVENOUS EVERY 24 HOURS
Refills: 0 | Status: DISCONTINUED | OUTPATIENT
Start: 2021-12-27 | End: 2021-12-27

## 2021-12-27 RX ADMIN — CEFEPIME 500 MILLIGRAM(S): 1 INJECTION, POWDER, FOR SOLUTION INTRAMUSCULAR; INTRAVENOUS at 06:14

## 2021-12-27 RX ADMIN — Medication 100 MILLIGRAM(S): at 07:02

## 2021-12-27 RX ADMIN — PANTOPRAZOLE SODIUM 40 MILLIGRAM(S): 20 TABLET, DELAYED RELEASE ORAL at 23:50

## 2021-12-27 RX ADMIN — Medication 1 MILLIGRAM(S): at 23:49

## 2021-12-27 RX ADMIN — Medication 5 MILLIGRAM(S): at 00:49

## 2021-12-27 RX ADMIN — TRAMADOL HYDROCHLORIDE 50 MILLIGRAM(S): 50 TABLET ORAL at 00:49

## 2021-12-27 RX ADMIN — SODIUM CHLORIDE 1000 MILLILITER(S): 9 INJECTION INTRAMUSCULAR; INTRAVENOUS; SUBCUTANEOUS at 19:26

## 2021-12-27 RX ADMIN — SODIUM CHLORIDE 2000 MILLILITER(S): 9 INJECTION INTRAMUSCULAR; INTRAVENOUS; SUBCUTANEOUS at 04:00

## 2021-12-27 NOTE — CONSULT NOTE ADULT - ASSESSMENT
89 years old female ( on wheel chair ) with past history of Right arthroplasty, chronic back pain,Dementia previous episodes of UTIs presented today to ED with AMS.   1.D/C tele.  2.?UTI-abx,pan cx.  3.Altered mental status-Neurology eval.  4.No further cardiac testing needed.  5.?CRI-IVF.  6.GI and DVT prophylaxis.

## 2021-12-27 NOTE — RAPID RESPONSE TEAM SUMMARY - NSMEDICATIONSRRT_GEN_ALL_CORE
1L NS BOLUS GIVEN, ANTIBIOTIC CHANGED TO cefepime, vanc 1 dose,   cbc, cmp, blood cx, ucx, lactate,   ekg

## 2021-12-27 NOTE — CONSULT NOTE ADULT - SUBJECTIVE AND OBJECTIVE BOX
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  Glaucoma  Urinary Frequency  Prolapse of Vaginal Wall  Back pain  Bacterial UTI  S/P Hip Replacement    Bilateral Cataracts removed 2010  S/P Colonoscopy        SOCIAL HISTORY  Alcohol: Does not drink  Tobacco: Does not smoke  Illicit substance use: None      FAMILY HISTORY: Non contributory to the present illness        ALLERGIES: sulfa drugs (Hives)        Vital Signs Last 24 Hrs  T(C): 36.7 (27 Dec 2021 19:58), Max: 37.8 (27 Dec 2021 11:30)  T(F): 98 (27 Dec 2021 19:58), Max: 100 (27 Dec 2021 11:30)  HR: 101 (27 Dec 2021 19:58) (66 - 112)  BP: 109/71 (27 Dec 2021 19:58) (38/19 - 109/71)  BP(mean): --  RR: 20 (27 Dec 2021 19:58) (20 - 20)  SpO2: 95% (27 Dec 2021 19:58) (94% - 98%)      PHYSICAL EXAM:  GENERAL: Not in distress   CHEST/LUNG:  Aire ntry bilaterally  HEART: s1 and s2 present  ABDOMEN:  Nontender and  Nondistended  EXTREMITIES: No pedal  edema  CNS: Awake and Alert      LABS:                        10.8   18.34 )-----------( 160      ( 27 Dec 2021 14:42 )             33.8         12-    147<H>  |  122<H>  |  47<H>  ----------------------------<  72  4.2   |  17<L>  |  1.90<H>    Ca    8.2<L>      27 Dec 2021 14:42  Phos  2.5     12-  Mg     1.7     12-    TPro  5.3<L>  /  Alb  1.9<L>  /  TBili  0.9  /  DBili  x   /  AST  38  /  ALT  39  /  AlkPhos  104  12-27    PT/INR - ( 27 Dec 2021 04:52 )   PT: 12.5 sec;   INR: 1.05 ratio    PTT - ( 27 Dec 2021 04:52 )  PTT:29.6 sec        CAPILLARY BLOOD GLUCOSE  POCT Blood Glucose.: 80 mg/dL (27 Dec 2021 04:17)        Urinalysis Basic - ( 26 Dec 2021 20:36 )  Color: Red / Appearance: bloody / S.010 / pH: x  Gluc: x / Ketone: Negative  / Bili: Negative / Urobili: Negative   Blood: x / Protein: 100 / Nitrite: Positive   Leuk Esterase: Moderate / RBC: >50 /HPF / WBC >50 /HPF   Sq Epi: x / Non Sq Epi: Few /HPF / Bacteria: Moderate /HPF        MEDICATIONS  (STANDING):  cefepime   IVPB      melatonin 5 milliGRAM(s) Oral at bedtime  pantoprazole  Injectable 40 milliGRAM(s) IV Push at bedtime  sodium chloride 0.9%. 1000 milliLiter(s) (70 mL/Hr) IV Continuous <Continuous>    MEDICATIONS  (PRN):  acetaminophen     Tablet .. 650 milliGRAM(s) Oral every 6 hours PRN Mild Pain (1 - 3)          RADIOLOGY & ADDITIONAL TESTS:               Patient is a 89y old  Female ( on wheel chair ) with past history of Right arthroplasty, chronic back pain, previous episodes of UTIs, now brought in to the ER for evaluation of AMS. She was admitted before to [facility] for previous UTIs. Patient was seen by neurologist before for her dementia and she was told that is normal for age. On admission, she has no fever but positive Urine analysis. The CXR shows Right perihilar opacity and CT abd/pelvis shows Left hydronephrosis and perinephric stranding. She has started on Cefepime and the ID consult requested to assist with further evaluation and antibiotic management.      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  Glaucoma  Urinary Frequency  Prolapse of Vaginal Wall  Back pain  Bacterial UTI  S/P Hip Replacement    Bilateral Cataracts removed   S/P Colonoscopy        SOCIAL HISTORY  Alcohol: Does not drink  Tobacco: Does not smoke  Illicit substance use: None      FAMILY HISTORY: Non contributory to the present illness      ALLERGIES: sulfa drugs (Hives)      Vital Signs Last 24 Hrs  T(C): 36.7 (27 Dec 2021 19:58), Max: 37.8 (27 Dec 2021 11:30)  T(F): 98 (27 Dec 2021 19:58), Max: 100 (27 Dec 2021 11:30)  HR: 101 (27 Dec 2021 19:58) (66 - 112)  BP: 109/71 (27 Dec 2021 19:58) (38/19 - 109/71)  BP(mean): --  RR: 20 (27 Dec 2021 19:58) (20 - 20)  SpO2: 95% (27 Dec 2021 19:58) (94% - 98%)      PHYSICAL EXAM:  GENERAL: Not in distress   CHEST/LUNG:  Not using accessory muscles  HEART: s1 and s2 present  ABDOMEN:  Nontender and  Nondistended  EXTREMITIES: No pedal  edema  CNS: Confused , and screaming intermittently       LABS:                        10.8   18.34 )-----------( 160      ( 27 Dec 2021 14:42 )             33.8         12-    147<H>  |  122<H>  |  47<H>  ----------------------------<  72  4.2   |  17<L>  |  1.90<H>    Ca    8.2<L>      27 Dec 2021 14:42  Phos  2.5       Mg     1.7         TPro  5.3<L>  /  Alb  1.9<L>  /  TBili  0.9  /  DBili  x   /  AST  38  /  ALT  39  /  AlkPhos  104      PT/INR - ( 27 Dec 2021 04:52 )   PT: 12.5 sec;   INR: 1.05 ratio    PTT - ( 27 Dec 2021 04:52 )  PTT:29.6 sec        CAPILLARY BLOOD GLUCOSE  POCT Blood Glucose.: 80 mg/dL (27 Dec 2021 04:17)        Urinalysis Basic - ( 26 Dec 2021 20:36 )  Color: Red / Appearance: bloody / S.010 / pH: x  Gluc: x / Ketone: Negative  / Bili: Negative / Urobili: Negative   Blood: x / Protein: 100 / Nitrite: Positive   Leuk Esterase: Moderate / RBC: >50 /HPF / WBC >50 /HPF   Sq Epi: x / Non Sq Epi: Few /HPF / Bacteria: Moderate /HPF        MEDICATIONS  (STANDING):  cefepime   IVPB      melatonin 5 milliGRAM(s) Oral at bedtime  pantoprazole  Injectable 40 milliGRAM(s) IV Push at bedtime  sodium chloride 0.9%. 1000 milliLiter(s) (70 mL/Hr) IV Continuous <Continuous>    MEDICATIONS  (PRN):  acetaminophen     Tablet .. 650 milliGRAM(s) Oral every 6 hours PRN Mild Pain (1 - 3)        RADIOLOGY & ADDITIONAL TESTS:    21: CT Abdomen and Pelvis No Cont (21 @ 20:03) Significantly limited exam due to motion artifact and noncontrast technique.    2.9 cm stone and 1.3 cm stone at the level of the left renal pelvis  possibly extending into the UPJ, difficult to evaluate. At least mild   left upper pole hydronephrosis and indeterminate lower pole hydronephrosis secondary to possible parapelvic cyst in this location.   Mild left perinephric stranding. Recommend correlation with urinalysis.    21: Xray Chest 1 View-PORTABLE IMMEDIATE (Xray Chest 1 View-PORTABLE IMMEDIATE .) (21 @ 20:52) Right perihilar opacity, differential diagnosis includes focal  consolidation and aortic aneurysm      MICROBIOLOGY DATA:    COVID-19 PCR . (21 @ 20:36)   COVID-19 PCR: NotDetec:

## 2021-12-27 NOTE — PROGRESS NOTE ADULT - PROBLEM SELECTOR PLAN 1
- Patient with mild dementia at baseline presented with AMS. As per conversation with daughter today pt MS waxes and wanes  - UA is positive   - previously on Rocephin, now on cefepime  - dysphagia screen pending; If tolerated we can start on puree diet   - f/u urine culture and BCX  - Fall precautions   - Aspiration precautions

## 2021-12-27 NOTE — PROGRESS NOTE ADULT - SUBJECTIVE AND OBJECTIVE BOX
PGY-1 Progress Note discussed with attending    PAGER #: [[phone]] TILL 5:00 PM  PLEASE CONTACT ON CALL TEAM:  - On Call Team (Please refer to [facility]) FROM 5:00 PM - 8:30PM  - Nightfloat Team FROM 8:30 -7:30 AM    CHIEF COMPLAINT & BRIEF HOSPITAL COURSE:    INTERVAL HPI/OVERNIGHT EVENTS:       REVIEW OF SYSTEMS:  CONSTITUTIONAL: No fever, weight loss, or fatigue  RESPIRATORY: No cough, wheezing, chills or hemoptysis; No shortness of breath  CARDIOVASCULAR: No chest pain, palpitations, dizziness, or leg swelling  GASTROINTESTINAL: No abdominal pain. No nausea, vomiting, or hematemesis; No diarrhea or constipation. No melena or hematochezia.  GENITOURINARY: No dysuria or hematuria, urinary frequency  NEUROLOGICAL: No headaches, memory loss, loss of strength, numbness, or tremors  SKIN: No itching, burning, rashes, or lesions     Vital Signs Last 24 Hrs  T(C): 35.7 (27 Dec 2021 07:34), Max: 36.9 (27 Dec 2021 02:20)  T(F): 96.2 (27 Dec 2021 07:34), Max: 98.4 (27 Dec 2021 02:20)  HR: 72 (27 Dec 2021 07:34) (66 - 74)  BP: 106/51 (27 Dec 2021 07:34) (38/19 - 118/76)  BP(mean): --  RR: 20 (27 Dec 2021 07:34) (20 - 20)  SpO2: 94% (27 Dec 2021 07:34) (94% - 98%)    PHYSICAL EXAMINATION:  GENERAL: NAD, well built  HEAD:  Atraumatic, Normocephalic  EYES:  conjunctiva and sclera clear  NECK: Supple, No JVD, Normal thyroid  CHEST/LUNG: Clear to auscultation. Clear to percussion bilaterally; No rales, rhonchi, wheezing, or rubs  HEART: Regular rate and rhythm; No murmurs, rubs, or gallops  ABDOMEN: Soft, Nontender, Nondistended; Bowel sounds present  NERVOUS SYSTEM:  Alert & Oriented X3,    EXTREMITIES:  2+ Peripheral Pulses, No clubbing, cyanosis, or edema  SKIN: warm dry                          11.2   17.66 )-----------( 150      ( 27 Dec 2021 04:53 )             34.3     12-27    147<H>  |  120<H>  |  40<H>  ----------------------------<  83  4.0   |  23  |  1.58<H>    Ca    8.3<L>      27 Dec 2021 04:53  Phos  2.5     12-27  Mg     1.7     12-27    TPro  5.3<L>  /  Alb  1.8<L>  /  TBili  0.8  /  DBili  x   /  AST  44<H>  /  ALT  44  /  AlkPhos  102  12-27    LIVER FUNCTIONS - ( 27 Dec 2021 04:53 )  Alb: 1.8 g/dL / Pro: 5.3 g/dL / ALK PHOS: 102 U/L / ALT: 44 U/L DA / AST: 44 U/L / GGT: x           CARDIAC MARKERS ( 26 Dec 2021 18:02 )  x     / x     / 95 U/L / x     / x          PT/INR - ( 27 Dec 2021 04:52 )   PT: 12.5 sec;   INR: 1.05 ratio         PTT - ( 27 Dec 2021 04:52 )  PTT:29.6 sec    CAPILLARY BLOOD GLUCOSE      RADIOLOGY & ADDITIONAL TESTS:                   PGY-1 Progress Note discussed with attending    PAGER #: [[phone]] TILL 5:00 PM  PLEASE CONTACT ON CALL TEAM:  - On Call Team (Please refer to [facility]) FROM 5:00 PM - 8:30PM  - Nightfloat Team FROM 8:30 -7:30 AM      INTERVAL HPI/OVERNIGHT EVENTS:  RRT was called overnight as pt was hypotensive, she received IVF bolus. Given concerns for sepsis from urinary origin she was given cefepime and single dose of vancomycin.  Pt seen and examined at bedside, responsive to painful stimulus. Cannot assess for ROS    Vital Signs Last 24 Hrs  T(C): 35.7 (27 Dec 2021 07:34), Max: 36.9 (27 Dec 2021 02:20)  T(F): 96.2 (27 Dec 2021 07:34), Max: 98.4 (27 Dec 2021 02:20)  HR: 72 (27 Dec 2021 07:34) (66 - 74)  BP: 106/51 (27 Dec 2021 07:34) (38/19 - 118/76)  BP(mean): --  RR: 20 (27 Dec 2021 07:34) (20 - 20)  SpO2: 94% (27 Dec 2021 07:34) (94% - 98%)    PHYSICAL EXAMINATION:  GENERAL: NAD, thin  HEAD:  Atraumatic, Normocephalic  EYES:  conjunctiva and sclera clear  NECK: Supple, No JVD  CHEST/LUNG: Clear to auscultation. Clear to percussion bilaterally; No rales, rhonchi, wheezing, or rubs  HEART: Regular rate and rhythm; No murmurs, rubs, or gallops  ABDOMEN: Soft, Nontender, Nondistended; Bowel sounds present  NERVOUS SYSTEM:  Alert & Oriented X0,    EXTREMITIES:  2+ Peripheral Pulses, No clubbing, cyanosis, or edema  SKIN: warm dry                          11.2   17.66 )-----------( 150      ( 27 Dec 2021 04:53 )             34.3     12-27    147<H>  |  120<H>  |  40<H>  ----------------------------<  83  4.0   |  23  |  1.58<H>    Ca    8.3<L>      27 Dec 2021 04:53  Phos  2.5     12-27  Mg     1.7     12-27    TPro  5.3<L>  /  Alb  1.8<L>  /  TBili  0.8  /  DBili  x   /  AST  44<H>  /  ALT  44  /  AlkPhos  102  12-27    LIVER FUNCTIONS - ( 27 Dec 2021 04:53 )  Alb: 1.8 g/dL / Pro: 5.3 g/dL / ALK PHOS: 102 U/L / ALT: 44 U/L DA / AST: 44 U/L / GGT: x           CARDIAC MARKERS ( 26 Dec 2021 18:02 )  x     / x     / 95 U/L / x     / x          PT/INR - ( 27 Dec 2021 04:52 )   PT: 12.5 sec;   INR: 1.05 ratio         PTT - ( 27 Dec 2021 04:52 )  PTT:29.6 sec    CAPILLARY BLOOD GLUCOSE      RADIOLOGY & ADDITIONAL TESTS:

## 2021-12-27 NOTE — PATIENT PROFILE ADULT - FALL HARM RISK - HARM RISK INTERVENTIONS

## 2021-12-27 NOTE — CONSULT NOTE ADULT - SUBJECTIVE AND OBJECTIVE BOX
CHIEF COMPLAINT:Patient is a 89y old  Female who presents with a chief complaint of Altered mental status.      HPI:  Patient is 89 years old female ( on wheel chair ) with past history of Right arthroplasty, chronic back pain,Dementia previous episodes of UTIs presented today to ED with AMS. Patient is very poor historian, so history was obtained by daughter. Patient was [facility] before to [facility] for previous [facility]Is. Patient was seen by neurologist before for her dementia and she was told that is normal for age. Patient is taking overcounter meds for her back pain and heartburn. She mentioned that she was taking lasix for her fluid retention but stopped few months ago. The daughter wants her DNR/DNI.   (26 Dec 2021 20:14)      PAST MEDICAL & SURGICAL HISTORY:  Back pain    Bacterial UTI        MEDICATIONS  (STANDING):  cefepime   IVPB      melatonin 5 milliGRAM(s) Oral at bedtime  pantoprazole  Injectable 40 milliGRAM(s) IV Push at bedtime  sodium chloride 0.9%. 1000 milliLiter(s) (70 mL/Hr) IV Continuous <Continuous>    MEDICATIONS  (PRN):  acetaminophen     Tablet .. 650 milliGRAM(s) Oral every 6 hours PRN Mild Pain (1 - 3)      FAMILY HISTORY:unable to obtain      SOCIAL HISTORY:    unable to obtain  Allergies    No Known Allergies    Intolerances    	    REVIEW OF SYSTEMS:  	  [x ] Unable to obtain    PHYSICAL EXAM:  T(C): 35.7 (12-27-21 @ 07:34), Max: 36.9 (12-27-21 @ 02:20)  HR: 72 (12-27-21 @ 07:34) (66 - 74)  BP: 106/51 (12-27-21 @ 07:34) (38/19 - 118/76)  RR: 20 (12-27-21 @ 07:34) (20 - 20)  SpO2: 94% (12-27-21 @ 07:34) (94% - 98%)  Wt(kg): --  I&O's Summary    26 Dec 2021 07:01  -  27 Dec 2021 07:00  --------------------------------------------------------  IN: 450 mL / OUT: 0 mL / NET: 450 mL        Appearance: Normal	  HEENT:   Normal oral mucosa, PERRL, EOMI	  Lymphatic: No lymphadenopathy  Cardiovascular: Normal S1 S2, No JVD, No murmurs, No edema  Respiratory: Lungs clear to auscultation	  Gastrointestinal:  Soft, Non-tender, + BS	  Skin: No rashes, No ecchymoses, No cyanosis	  Extremities: Normal range of motion, No clubbing, cyanosis or edema  Vascular: Peripheral pulses palpable 2+ bilaterally      ECG:  	nsr,lad,lvh,st-t abnormality  	  	  LABS:	 	      CARDIAC MARKERS ( 26 Dec 2021 18:02 )  x     / x     / 95 U/L / x     / x          Troponin I, High Sensitivity Result: 335.8 ng/L (12-27-21 @ 04:53)  Troponin I, High Sensitivity Result: 93.3 ng/L (12-26-21 @ 18:02)                          11.2   17.66 )-----------( 150      ( 27 Dec 2021 04:53 )             34.3     12-27    147<H>  |  120<H>  |  40<H>  ----------------------------<  83  4.0   |  23  |  1.58<H>    Ca    8.3<L>      27 Dec 2021 04:53  Phos  2.5     12-27  Mg     1.7     12-27    TPro  5.3<L>  /  Alb  1.8<L>  /  TBili  0.8  /  DBili  x   /  AST  44<H>  /  ALT  44  /  AlkPhos  102  12-27    e< from: CT Head No Cont (12.26.21 @ 20:03) >  ACC: [id number] EXAM:  CT BRAIN                          PROCEDURE DATE:  12/26/2021          INTERPRETATION:  CLINICAL INFORMATION: Status post fall.    TECHNIQUE:  Axial CT images were acquired through the head.  Intravenous contrast: None  Two-dimensional reformats were generated.    COMPARISON STUDY: None    FINDINGS:  There is no CT evidence of acute intracranial hemorrhage, mass effect,   midline shift or acute territorial infarct.    There is moderate generalized cerebral volume loss and mild   periventricular white matter hypoattenuation compatible chronic ischemic   disease.    The paranasal sinuses and mastoids are grossly clear.    The patient is status post cataract lens replacement surgery bilaterally.    The calvarium, skull base and scalp soft tissues appear unremarkable..    IMPRESSION:  No CT evidence of acute intracranial pathology.    --- End of Report ---            BOYD TAYLOR MD; Attending Radiologist  This document has been electronically signed. Dec 26 2021  8:00PM    < end of copied text >  < from: CT Abdomen and Pelvis No Cont (12.26.21 @ 20:03) >    PROCEDURE DATE:  12/26/2021          INTERPRETATION:  CLINICAL INFORMATION: Altered mental status, back pain.    COMPARISON: None.    CONTRAST/COMPLICATIONS:  IV Contrast: NONE  Oral Contrast: NONE  Complications: None reported at time of study completion    PROCEDURE:  CT of the Abdomen and Pelvis was performed.  Sagittal and coronal reformats were performed.    FINDINGS:  Evaluation of solid organs and vascular structures is limited without   intravenous contrast. Exam is degraded by motion artifact.    LOWER CHEST: Bibasilar subsegmental atelectasis. Trace left pleural   fluid. Cardiomegaly. Aortic valve and mitral valve annular   calcifications. Aortic and coronary artery atherosclerosis. Ectasia of   the ascending aorta to 4.0 cm.    LIVER: Within normal limits.  BILE DUCTS: Normal caliber.  GALLBLADDER: Within normal limits.  SPLEEN: Within normal limits.  PANCREAS: Atrophic.  ADRENALS: Bilateral adrenal gland thickening.  KIDNEYS/URETERS: Bilateral renal cysts. 2.9 cm stone and 1.3 cm stone at   the level of the left renal pelvis, possibly extending into the UPJ,   difficult to evaluate. At least mild left hydronephrosis. Difficult to   evaluate lower pole hydronephrosis due to possible parapelvic cyst in   this region. Left perinephric stranding.    BLADDER: Within normal limits.  REPRODUCTIVE ORGANS: Limited evaluation due to streak artifact from hip   hardware. Grossly within normal limits.    BOWEL: No bowel obstruction. Large colonic stool burden in the rectum.   Colonic diverticulosis. Normal appendix.  PERITONEUM: No ascites.  VESSELS: Atherosclerotic changes.  RETROPERITONEUM/LYMPH NODES: No lymphadenopathy.  ABDOMINAL WALL: Tiny fat-containing umbilical hernia. Right pelvic hernia   containing colon, appendix and terminal ileum. Left pelvic hernia   containing nonobstructed loops of small bowel.  BONES: Age-indeterminate mildly displaced fracture of the posterolateral   right 10th rib (2,55). Right hip arthroplasty. Chronic fracture   deformities of the right superior and inferior rami. Sacral perineural   cyst. Degenerative changes. Grade 2 anterolisthesis at L5-S1.    IMPRESSION:  Significantly limited exam due to motion artifact and noncontrast   technique.    2.9 cm stone and 1.3 cm stone at the level of the left renal pelvis   possibly extending into the UPJ, difficult to evaluate. At least mild   left upper pole hydronephrosis and indeterminate lower pole   hydronephrosis secondary to possible parapelvic cyst in this location.   Mild left perinephric stranding. Recommend correlation with urinalysis.    Age-indeterminate mildly displaced fracture of the posterolateral right   10th rib.            --- End of Report ---            YARIEL SALAZAR MD; Attending Radiologist  This document has been electronically signed. Dec 26 2021  8:33PM    < end of copied text >

## 2021-12-27 NOTE — PROGRESS NOTE ADULT - CONVERSATION DETAILS
As per daughter, family wants to continue with ABX and IVF. Does not want pressor support or central line placement if pt was to require it

## 2021-12-27 NOTE — PROGRESS NOTE ADULT - PROBLEM SELECTOR PLAN 4
- Could be poor oral intake; Prerenal?  - FeNa 0.5%  - c/w pre-renal PATRICK most likely due to poor oral intake

## 2021-12-27 NOTE — PHARMACOTHERAPY INTERVENTION NOTE - COMMENTS
Pt is on [facility] LIST for Acute MI.  Medication profile reviewed. No recommendations at this time.

## 2021-12-27 NOTE — CHART NOTE - NSCHARTNOTEFT_GEN_A_CORE
Was told that patient is screaming and disturbing other patients. Ordered Ativan 1mg which she had gotten yesterday. Was told that patient is screaming(denies pain) and disturbing other patients. Ordered Ativan 1mg which she had gotten yesterday.

## 2021-12-27 NOTE — CHART NOTE - NSCHARTNOTEFT_GEN_A_CORE
Pt came in with blood in urine   grossly bloody on exam  will observe for clearing up after michaud placement  will dc dvt prophylaxis for now   put on scd boot

## 2021-12-27 NOTE — CONSULT NOTE ADULT - ASSESSMENT
# UTI  # Metabolic encephalopathy  # Leukocytosis    would recommend:    1. Follow up Urine and Blood cultures  2. Monitor WBC count  3. Continue Cefepime until work up is done  4. Monitor kidney function and adjust Abx doses accordingly    will follow the patient with you and make further recommendation based on the clinical course and Lab results  Thank you for the opportunity to participate in Ms. SINCLAIR's care    Attending Attestation:    Spent more than 65 minutes on total encounter, more than 50 % of the visit was spent counseling and/or coordinating care by the Attending physician.     Patient is a 89y old  Female ( on wheel chair ) with past history of Right arthroplasty, chronic back pain, previous episodes of UTIs, now brought in to the ER for evaluation of AMS. She was admitted before to [facility] and [facility] for previous UTIs. Patient was seen by neurologist before for her dementia and she was told that is normal for age. On admission, she has no fever but positive Urine analysis. The CXR shows Right perihilar opacity and CT abd/pelvis shows Left hydronephrosis and perinephric stranding. She has started on Cefepime and the ID consult requested to assist with further evaluation and antibiotic management.    # UTI  # Metabolic encephalopathy  # Leukocytosis  #  Left hydronephrosis and perinephric stranding- on CT abd/pelvis  # Pneumonia - on CXR    would recommend:    1. Follow up Urine and Blood cultures  2. Monitor WBC count  3. Continue Cefepime until work up is done  4. Monitor kidney function and adjust Abx doses accordingly  5. MRSA PCR and Legionella urine AG   6. Aspiration precaution    will follow the patient with you and make further recommendation based on the clinical course and Lab results  Thank you for the opportunity to participate in Ms. SINCLAIR's care    Attending Attestation:    Spent more than 65 minutes on total encounter, more than 50 % of the visit was spent counseling and/or coordinating care by the Attending physician.

## 2021-12-28 LAB
ALBUMIN SERPL ELPH-MCNC: 1.7 G/DL — LOW (ref 3.5–5)
ALP SERPL-CCNC: 100 U/L — SIGNIFICANT CHANGE UP (ref 40–120)
ALT FLD-CCNC: 33 U/L DA — SIGNIFICANT CHANGE UP (ref 10–60)
ANION GAP SERPL CALC-SCNC: 10 MMOL/L — SIGNIFICANT CHANGE UP (ref 5–17)
AST SERPL-CCNC: 29 U/L — SIGNIFICANT CHANGE UP (ref 10–40)
BILIRUB SERPL-MCNC: 0.8 MG/DL — SIGNIFICANT CHANGE UP (ref 0.2–1.2)
BUN SERPL-MCNC: 52 MG/DL — HIGH (ref 7–18)
CALCIUM SERPL-MCNC: 7.8 MG/DL — LOW (ref 8.4–10.5)
CHLORIDE SERPL-SCNC: 121 MMOL/L — HIGH (ref 96–108)
CO2 SERPL-SCNC: 18 MMOL/L — LOW (ref 22–31)
CREAT SERPL-MCNC: 2.01 MG/DL — HIGH (ref 0.5–1.3)
GLUCOSE BLDC GLUCOMTR-MCNC: 70 MG/DL — SIGNIFICANT CHANGE UP (ref 70–99)
GLUCOSE SERPL-MCNC: 39 MG/DL — CRITICAL LOW (ref 70–99)
HCT VFR BLD CALC: 30.7 % — LOW (ref 34.5–45)
HGB BLD-MCNC: 9.8 G/DL — LOW (ref 11.5–15.5)
MAGNESIUM SERPL-MCNC: 1.7 MG/DL — SIGNIFICANT CHANGE UP (ref 1.6–2.6)
MCHC RBC-ENTMCNC: 30.8 PG — SIGNIFICANT CHANGE UP (ref 27–34)
MCHC RBC-ENTMCNC: 31.9 GM/DL — LOW (ref 32–36)
MCV RBC AUTO: 96.5 FL — SIGNIFICANT CHANGE UP (ref 80–100)
NRBC # BLD: 0 /100 WBCS — SIGNIFICANT CHANGE UP (ref 0–0)
PHOSPHATE SERPL-MCNC: 3.6 MG/DL — SIGNIFICANT CHANGE UP (ref 2.5–4.5)
PLATELET # BLD AUTO: 138 K/UL — LOW (ref 150–400)
POTASSIUM SERPL-MCNC: 4.7 MMOL/L — SIGNIFICANT CHANGE UP (ref 3.5–5.3)
POTASSIUM SERPL-SCNC: 4.7 MMOL/L — SIGNIFICANT CHANGE UP (ref 3.5–5.3)
PROT SERPL-MCNC: 5.2 G/DL — LOW (ref 6–8.3)
RBC # BLD: 3.18 M/UL — LOW (ref 3.8–5.2)
RBC # FLD: 15.9 % — HIGH (ref 10.3–14.5)
SODIUM SERPL-SCNC: 149 MMOL/L — HIGH (ref 135–145)
TROPONIN I, HIGH SENSITIVITY RESULT: 568.2 NG/L — HIGH
TROPONIN I, HIGH SENSITIVITY RESULT: 589.2 NG/L — HIGH
URATE UR-MCNC: 16.7 MG/DL — SIGNIFICANT CHANGE UP
WBC # BLD: 18.53 K/UL — HIGH (ref 3.8–10.5)
WBC # FLD AUTO: 18.53 K/UL — HIGH (ref 3.8–10.5)

## 2021-12-28 RX ORDER — SODIUM BICARBONATE 1 MEQ/ML
650 SYRINGE (ML) INTRAVENOUS THREE TIMES A DAY
Refills: 0 | Status: DISCONTINUED | OUTPATIENT
Start: 2021-12-28 | End: 2022-01-05

## 2021-12-28 RX ORDER — HEPARIN SODIUM 5000 [USP'U]/ML
5000 INJECTION INTRAVENOUS; SUBCUTANEOUS EVERY 8 HOURS
Refills: 0 | Status: DISCONTINUED | OUTPATIENT
Start: 2021-12-28 | End: 2022-01-05

## 2021-12-28 RX ORDER — ACETAMINOPHEN 500 MG
1000 TABLET ORAL ONCE
Refills: 0 | Status: COMPLETED | OUTPATIENT
Start: 2021-12-28 | End: 2021-12-28

## 2021-12-28 RX ORDER — SODIUM CHLORIDE 9 MG/ML
1000 INJECTION, SOLUTION INTRAVENOUS
Refills: 0 | Status: DISCONTINUED | OUTPATIENT
Start: 2021-12-28 | End: 2021-12-29

## 2021-12-28 RX ORDER — DEXTROSE 50 % IN WATER 50 %
25 SYRINGE (ML) INTRAVENOUS ONCE
Refills: 0 | Status: DISCONTINUED | OUTPATIENT
Start: 2021-12-28 | End: 2021-12-28

## 2021-12-28 RX ADMIN — SODIUM CHLORIDE 70 MILLILITER(S): 9 INJECTION INTRAMUSCULAR; INTRAVENOUS; SUBCUTANEOUS at 00:32

## 2021-12-28 RX ADMIN — HEPARIN SODIUM 5000 UNIT(S): 5000 INJECTION INTRAVENOUS; SUBCUTANEOUS at 22:23

## 2021-12-28 RX ADMIN — PANTOPRAZOLE SODIUM 40 MILLIGRAM(S): 20 TABLET, DELAYED RELEASE ORAL at 22:23

## 2021-12-28 RX ADMIN — CEFEPIME 100 MILLIGRAM(S): 1 INJECTION, POWDER, FOR SOLUTION INTRAMUSCULAR; INTRAVENOUS at 03:56

## 2021-12-28 NOTE — CONSULT NOTE ADULT - SUBJECTIVE AND OBJECTIVE BOX
Interval HPI:   Urology called to see and evaluate 89 year old female with PMH of HTN, HLD, Functional Heart murmur, MVP, Asthma, Hiatal hernia, spinal stenosis, glaucoma for CT findings of 2.9 cm stone and 1.3 cm stone at the level of the left renal pelvis possibly extending into the UPJ, difficult to evaluate, at least mild left upper pole hydronephrosis and indeterminate lower pole hydronephrosis secondary to possible parapelvic cyst in this location, mild left perinephric stranding.   Patient poor historian and not able to provide any history or any symptoms.     HPI:  Patient is 89 years old female ( on wheel chair ) with past history of Right arthroplasty, chronic back pain, previous episodes of UTIs presented today to ED with AMS. Patient is very poor historian, so history was obtained by daughter. Patient was admitted before to [facility] and [facility] for previous UTIs. Patient was seen by neurologist before for her dementia and she was told that is normal for age. Patient is taking over counter meds for her back pain and heartburn. She mentioned that she was taking lasix for her fluid retention but stopped few months ago. The daughter wants her DNR/DNI.   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 1000 milliLiter(s) (60 mL/Hr) IV Continuous <Continuous>    MEDICATIONS  (PRN):  acetaminophen     Tablet .. 650 milliGRAM(s) Oral every 6 hours PRN Mild Pain (1 - 3)      Allergies    sulfa drugs (Hives)    Intolerances        Vital Signs Last 24 Hrs  T(C): 36.6 (28 Dec 2021 07:33), Max: 37.1 (27 Dec 2021 23:44)  T(F): 97.8 (28 Dec 2021 07:33), Max: 98.7 (27 Dec 2021 23:44)  HR: 87 (28 Dec 2021 07:33) (71 - 101)  BP: 130/78 (28 Dec 2021 07:33) (82/50 - 130/78)  BP(mean): --  RR: 20 (28 Dec 2021 07:33) (18 - 20)  SpO2: 98% (28 Dec 2021 07:33) (94% - 98%)    Physical:  Gen: NAD  Respirations: non-labored   Abd: Soft, nondistended, nontender to palpation, b/l lower abdominal masses noted  Ext: nontender b/l, nonedematous b/l  Gu: michaud cath in place, yellow clear urine noted      I&O's Detail    27 Dec 2021 07:01  -  28 Dec 2021 07:00  --------------------------------------------------------  IN:  Total IN: 0 mL    OUT:    Indwelling Catheter - Urethral (mL): 200 mL    Voided (mL): 100 mL  Total OUT: 300 mL    Total NET: -300 mL          LABS:                        9.8    18.53 )-----------( 138      ( 28 Dec 2021 07:26 )             30.7              12-28    149<H>  |  121<H>  |  52<H>  ----------------------------<  39<LL>  4.7   |  18<L>  |  2.01<H>    Ca    7.8<L>      28 Dec 2021 07:08  Phos  3.6     12-28  Mg     1.7     12-28    TPro  5.2<L>  /  Alb  1.7<L>  /  TBili  0.8  /  DBili  x   /  AST  29  /  ALT  33  /  AlkPhos  100  12-28            PT/INR - ( 27 Dec 2021 04:52 )   PT: 12.5 sec;   INR: 1.05 ratio         PTT - ( 27 Dec 2021 04:52 )  PTT:29.6 sec  Urinalysis Basic - ( 26 Dec 2021 20:36 )    Color: Red / Appearance: bloody / S.010 / pH: x  Gluc: x / Ketone: Negative  / Bili: Negative / Urobili: Negative   Blood: x / Protein: 100 / Nitrite: Positive   Leuk Esterase: Moderate / RBC: >50 /HPF / WBC >50 /HPF   Sq Epi: x / Non Sq Epi: Few /HPF / Bacteria: Moderate /HPF        RADIOLOGY & ADDITIONAL STUDIES:    < from: CT Abdomen and Pelvis No Cont (21 @ 20:03) >  INTERPRETATION:  CLINICAL INFORMATION: Altered mental status, back pain.    COMPARISON: None.    CONTRAST/COMPLICATIONS:  IV Contrast: NONE  Oral Contrast: NONE  Complications: None reported at time of study completion    PROCEDURE:  CT of the Abdomen and Pelvis was performed.  Sagittal and coronal reformats were performed.    FINDINGS:  Evaluation of solid organs and vascular structures is limited without   intravenous contrast. Exam is degraded by motion artifact.    LOWER CHEST: Bibasilar subsegmental atelectasis. Trace left pleural   fluid. Cardiomegaly. Aortic valve and mitral valve annular   calcifications. Aortic and coronary artery atherosclerosis. Ectasia of   the ascending aorta to 4.0 cm.    LIVER: Within normal limits.  BILE DUCTS: Normal caliber.  GALLBLADDER: Within normal limits.  SPLEEN: Within normal limits.  PANCREAS: Atrophic.  ADRENALS: Bilateral adrenal gland thickening.  KIDNEYS/URETERS: Bilateral renal cysts. 2.9 cm stone and 1.3 cm stone at   the level of the left renal pelvis, possibly extending into the UPJ,   difficult to evaluate. At least mild left hydronephrosis. Difficult to   evaluate lower pole hydronephrosis due to possible parapelvic cyst in   this region. Left perinephric stranding.    BLADDER: Within normal limits.  REPRODUCTIVE ORGANS: Limited evaluation due to streak artifact from hip   hardware. Grossly within normal limits.    BOWEL: No bowel obstruction. Large colonic stool burden in the rectum.   Colonic diverticulosis. Normal appendix.  PERITONEUM: No ascites.  VESSELS: Atherosclerotic changes.  RETROPERITONEUM/LYMPH NODES: No lymphadenopathy.  ABDOMINAL WALL: Tiny fat-containing umbilical hernia. Right pelvic hernia   containing colon, appendix and terminal ileum. Left pelvic hernia   containing nonobstructed loops of small bowel.  BONES: Age-indeterminate mildly displaced fracture of the posterolateral   right 10th rib (2,55). Right hip arthroplasty. Chronic fracture   deformities of the right superior and inferior rami. Sacral perineural   cyst. Degenerative changes. Grade 2 anterolisthesis at L5-S1.    IMPRESSION:  Significantly limited exam due to motion artifact and noncontrast   technique.    2.9 cm stone and 1.3 cm stone at the level of the left renal pelvis   possibly extending into the UPJ, difficult to evaluate. At least mild   left upper pole hydronephrosis and indeterminate lower pole   hydronephrosis secondary to possible parapelvic cyst in this location.   Mild left perinephric stranding. Recommend correlation with urinalysis.    Age-indeterminate mildly displaced fracture of the posterolateral right   10th rib.            --- End of Report ---    < end of copied text >             Interval HPI:   Urology called to see and evaluate 89 year old female with PMH of HTN, HLD, Functional Heart murmur, MVP, Asthma, Hiatal hernia, spinal stenosis, glaucoma for CT findings of 2.9 cm stone and 1.3 cm stone at the level of the left renal pelvis possibly extending into the UPJ, difficult to evaluate, at least mild left upper pole hydronephrosis and indeterminate lower pole hydronephrosis secondary to possible parapelvic cyst in this location, mild left perinephric stranding.   Patient poor historian and not able to provide any history or any symptoms.     HPI:  Patient is 89 years old female ( on wheel chair ) with past history of Right arthroplasty, chronic back pain, previous episodes of UTIs presented today to ED with AMS. Patient is very poor historian, so history was obtained by daughter. Patient was admitted before to [facility] for previous UTIs. Patient was seen by neurologist before for her dementia and she was told that is normal for age. Patient is taking over counter meds for her back pain and heartburn. She mentioned that she was taking lasix for her fluid retention but stopped few months ago. The daughter wants her DNR/DNI.   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 1000 milliLiter(s) (60 mL/Hr) IV Continuous <Continuous>    MEDICATIONS  (PRN):  acetaminophen     Tablet .. 650 milliGRAM(s) Oral every 6 hours PRN Mild Pain (1 - 3)      Allergies    sulfa drugs (Hives)    Intolerances        Vital Signs Last 24 Hrs  T(C): 36.6 (28 Dec 2021 07:33), Max: 37.1 (27 Dec 2021 23:44)  T(F): 97.8 (28 Dec 2021 07:33), Max: 98.7 (27 Dec 2021 23:44)  HR: 87 (28 Dec 2021 07:33) (71 - 101)  BP: 130/78 (28 Dec 2021 07:33) (82/50 - 130/78)  RR: 20 (28 Dec 2021 07:33) (18 - 20)  SpO2: 98% (28 Dec 2021 07:33) (94% - 98%)    Physical:  Gen: NAD  Respirations: non-labored   Abd: Soft, nondistended, nontender to palpation, b/l lower abdominal masses noted  Ext: nontender b/l, nonedematous b/l  Gu: michaud cath in place, yellow clear urine noted      I&O's Detail    27 Dec 2021 07:01  -  28 Dec 2021 07:00  --------------------------------------------------------  IN:  Total IN: 0 mL    OUT:    Indwelling Catheter - Urethral (mL): 200 mL    Voided (mL): 100 mL  Total OUT: 300 mL    Total NET: -300 mL          LABS:                        9.8    18.53 )-----------( 138      ( 28 Dec 2021 07:26 )             30.7              12-28    149<H>  |  121<H>  |  52<H>  ----------------------------<  39<LL>  4.7   |  18<L>  |  2.01<H>    Ca    7.8<L>      28 Dec 2021 07:08  Phos  3.6     12-  Mg     1.7         TPro  5.2<L>  /  Alb  1.7<L>  /  TBili  0.8  /  DBili  x   /  AST  29  /  ALT  33  /  AlkPhos  100  12-28            PT/INR - ( 27 Dec 2021 04:52 )   PT: 12.5 sec;   INR: 1.05 ratio         PTT - ( 27 Dec 2021 04:52 )  PTT:29.6 sec  Urinalysis Basic - ( 26 Dec 2021 20:36 )    Color: Red / Appearance: bloody / S.010 / pH: x  Gluc: x / Ketone: Negative  / Bili: Negative / Urobili: Negative   Blood: x / Protein: 100 / Nitrite: Positive   Leuk Esterase: Moderate / RBC: >50 /HPF / WBC >50 /HPF   Sq Epi: x / Non Sq Epi: Few /HPF / Bacteria: Moderate /HPF        RADIOLOGY & ADDITIONAL STUDIES:    < from: CT Abdomen and Pelvis No Cont (21 @ 20:03) >  INTERPRETATION:  CLINICAL INFORMATION: Altered mental status, back pain.    COMPARISON: None.    CONTRAST/COMPLICATIONS:  IV Contrast: NONE  Oral Contrast: NONE  Complications: None reported at time of study completion    PROCEDURE:  CT of the Abdomen and Pelvis was performed.  Sagittal and coronal reformats were performed.    FINDINGS:  Evaluation of solid organs and vascular structures is limited without   intravenous contrast. Exam is degraded by motion artifact.    LOWER CHEST: Bibasilar subsegmental atelectasis. Trace left pleural   fluid. Cardiomegaly. Aortic valve and mitral valve annular   calcifications. Aortic and coronary artery atherosclerosis. Ectasia of   the ascending aorta to 4.0 cm.    LIVER: Within normal limits.  BILE DUCTS: Normal caliber.  GALLBLADDER: Within normal limits.  SPLEEN: Within normal limits.  PANCREAS: Atrophic.  ADRENALS: Bilateral adrenal gland thickening.  KIDNEYS/URETERS: Bilateral renal cysts. 2.9 cm stone and 1.3 cm stone at   the level of the left renal pelvis, possibly extending into the UPJ,   difficult to evaluate. At least mild left hydronephrosis. Difficult to   evaluate lower pole hydronephrosis due to possible parapelvic cyst in   this region. Left perinephric stranding.    BLADDER: Within normal limits.  REPRODUCTIVE ORGANS: Limited evaluation due to streak artifact from hip   hardware. Grossly within normal limits.    BOWEL: No bowel obstruction. Large colonic stool burden in the rectum.   Colonic diverticulosis. Normal appendix.  PERITONEUM: No ascites.  VESSELS: Atherosclerotic changes.  RETROPERITONEUM/LYMPH NODES: No lymphadenopathy.  ABDOMINAL WALL: Tiny fat-containing umbilical hernia. Right pelvic hernia   containing colon, appendix and terminal ileum. Left pelvic hernia   containing nonobstructed loops of small bowel.  BONES: Age-indeterminate mildly displaced fracture of the posterolateral   right 10th rib (2,55). Right hip arthroplasty. Chronic fracture   deformities of the right superior and inferior rami. Sacral perineural   cyst. Degenerative changes. Grade 2 anterolisthesis at L5-S1.    IMPRESSION:  Significantly limited exam due to motion artifact and noncontrast   technique.    2.9 cm stone and 1.3 cm stone at the level of the left renal pelvis   possibly extending into the UPJ, difficult to evaluate. At least mild   left upper pole hydronephrosis and indeterminate lower pole   hydronephrosis secondary to possible parapelvic cyst in this location.   Mild left perinephric stranding. Recommend correlation with urinalysis.    Age-indeterminate mildly displaced fracture of the posterolateral right   10th rib.            --- End of Report ---    < end of copied text >

## 2021-12-28 NOTE — PROGRESS NOTE ADULT - PROBLEM SELECTOR PLAN 4
- Could be poor oral intake; Prerenal?  - FeNa 0.5%  - SCr 2.01 (slightly worsening)  - c/w pre-renal PATRICK most likely due to poor oral intake  - c/w IVH

## 2021-12-28 NOTE — CONSULT NOTE ADULT - ASSESSMENT
89 y.o. Female w. CT findings of 2.9 cm stone and 1.3 cm stone at the level of the left renal pelvis possibly extending into the UPJ, difficult to evaluate. At least mild left upper pole hydronephrosis and indeterminate lower pole hydronephrosis secondary to possible parapelvic cyst in this location. Mild left perinephric stranding    - plan pending    COMPLETE PLAN PENDING     89 y.o. Female w. CT findings of 2.9 cm stone and 1.3 cm stone at the level of the left renal pelvis possibly extending into the UPJ, difficult to evaluate. At least mild left upper pole hydronephrosis and indeterminate lower pole hydronephrosis secondary to possible parapelvic cyst in this location. Mild left perinephric stranding    afebrile, vitals wnl   wbc 18.53, uptrending   BUN 52, crt 2.01, uptrending  glucose 39  +UA, on cefepime    - medical clearance appreciated   - treat UA, as per primary team   - IR consult for nephrostomy tubes       Spoke to daughter, Daniella Metcalf, who stated that she is open to intervention regarding stones but she would like to know risks and benefits beforehand and would like to go through the most minimally invasive route possible.  COMPLETE PLAN PENDING     89 y.o. Female w. CT findings of 2.9 cm stone and 1.3 cm stone at the level of the left renal pelvis possibly extending into the UPJ, difficult to evaluate. At least mild left upper pole hydronephrosis and indeterminate lower pole hydronephrosis secondary to possible parapelvic cyst in this location. Mild left perinephric stranding    afebrile, vitals wnl   wbc 18.53, uptrending   BUN 52, crt 2.01, uptrending  glucose 39  +UA, on cefepime    - medical clearance appreciated   - treat UA, as per primary team   - IR consult for nephrostomy tubes       Spoke to daughter, Daniella Metcalf, who stated that she is open to intervention regarding stones but she would like to know risks and benefits beforehand and would like to go through the most minimally invasive route possible.   Spoke to NIKKO Henderson from IR who stated that her attending Dr. Hayes does not believe that any procedure would be safe from their end due to the patients age and elevated troponin.  89 y.o. Female w. CT findings of 2.9 cm stone and 1.3 cm stone at the level of the left renal pelvis possibly extending into the UPJ, difficult to evaluate. At least mild left upper pole hydronephrosis and indeterminate lower pole hydronephrosis secondary to possible parapelvic cyst in this location. Mild left perinephric stranding    afebrile, vitals wnl   wbc 18.53, uptrending   BUN 52, crt 2.01, uptrending  glucose 39  +UA, on cefepime    Spoke to daughter, Daniella Metcalf, who stated that she is open to intervention regarding stones but she would like to know risks and benefits beforehand and would like to go through the most minimally invasive route possible.   Spoke to NIKKO Henderson from IR who spoke to her attending Dr. Hayes and after reviewing the case they believe the patient is at high risk for IR intervention at this time.      - treat UA, as per primary team   - IV abx, as per primary team   - IVF   - IR consult for nephrostomy tubes   - urology will follow   - discussed and agreed with Dr. Cheng

## 2021-12-28 NOTE — CHART NOTE - NSCHARTNOTEFT_GEN_A_CORE
Palliative care called pt's daughter Daniella Metcalf to discuss pt's current clinical condition and  further GOC.  DNR/DNI on file.  She stated Urologist called to discuss surgery to address kidney stones which she is considering.  She requested that palliative care call her again in 2 days.  Palliative care will follow up in 2 days.

## 2021-12-28 NOTE — CONSULT NOTE ADULT - SUBJECTIVE AND OBJECTIVE BOX
Chief complain/HPI    Patient is 89 years old female ( on wheel chair ) with past history of Right arthroplasty, chronic back pain, previous episodes of UTIs presented today to ED with AMS. Patient is very poor historian, so history was obtained by daughter. Patient was admitted before to [facility] and [facility] for previous UTIs. Patient was seen by neurologist before for her dementia and she was told that is normal for age. Patient is taking overcounter meds for her back pain and heartburn. She mentioned that she was taking lasix for her fluid retention but stopped few months ago. The daughter wants her DNR/DNI.      She was admitted for Acute Encephalopathy with a background of dementia.  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 1000 milliLiter(s) (70 mL/Hr) IV Continuous <Continuous>    MEDICATIONS  (PRN):  acetaminophen     Tablet .. 650 milliGRAM(s) Oral every 6 hours PRN Mild Pain (1 - 3)      Allergies    sulfa drugs (Hives)    Intolerances    2020 CREATININE WAS 0.99  Comprehensive Metabolic Panel (12.26.21 @ 18:02)   Sodium, Serum: 146 mmol/L   Potassium, Serum: 4.3 mmol/L   Chloride, Serum: 116 mmol/L   Carbon Dioxide, Serum: 23 mmol/L   Anion Gap, Serum: 7 mmol/L   Blood Urea Nitrogen, Serum: 38 mg/dL   Creatinine, Serum: 1.65 mg/dL   Glucose, Serum: 122 mg/dL   Calcium, Total Serum: 9.6 mg/dL   Protein Total, Serum: 6.2 g/dL   Albumin, Serum: 2.5 g/dL   Bilirubin Total, Serum: 1.3 mg/dL   Alkaline Phosphatase, Serum: 164 U/L   Aspartate Aminotransferase (AST/SGOT): 81 U/L   Alanine Aminotransferase (ALT/SGPT): 59 U/L DA   eGFR if Non : 26: Interpretative comment                         9.8    18.53 )-----------( 138      ( 28 Dec 2021 07:26 )             30.7         149<H>  |  121<H>  |  52<H>  ----------------------------<  39<LL>  4.7   |  18<L>  |  2.01<H>    Ca    7.8<L>      28 Dec 2021 07:08  Phos  3.6       Mg     1.7         TPro  5.2<L>  /  Alb  1.7<L>  /  TBili  0.8  /  DBili  x   /  AST  29  /  ALT  33  /  AlkPhos  100      PT/INR - ( 27 Dec 2021 04:52 )   PT: 12.5 sec;   INR: 1.05 ratio         PTT - ( 27 Dec 2021 04:52 )  PTT:29.6 sec  Urinalysis Basic - ( 26 Dec 2021 20:36 )    Color: Red / Appearance: bloody / S.010 / pH: x  Gluc: x / Ketone: Negative  / Bili: Negative / Urobili: Negative   Blood: x / Protein: 100 / Nitrite: Positive   Leuk Esterase: Moderate / RBC: >50 /HPF / WBC >50 /HPF   Sq Epi: x / Non Sq Epi: Few /HPF / Bacteria: Moderate /HPF      Osmolality, Random Urine: 432 mos/kg ( @ 06:31)  Creatinine, Random Urine: 122 mg/dL ( @ 06:31)  Sodium, Random Urine: 57 mmol/L ( @ 06:31)    UTI - >100,000 CFU/ml Gram Negative Rods     RADIOLOGY & ADDITIONAL STUDIES:    SOCIAL HISTORY: Denies ETOh,Smoking,     FAMILY HISTORY:      REVIEW OF SYSTEMS:  CONSTITUTIONAL: No malaise, No fatigue, No fevers or chills, well developed, no diaphoresis  EYES/ENT: No visual changes;  No vertigo or throat pain   NECK: No pain or stiffness  RESPIRATORY: No cough, wheezing, hemoptysis; No shortness of breath  CARDIOVASCULAR: No chest pain or palpitations. No edema  GASTROINTESTINAL: No abdominal or epigastric pain. No nausea, vomiting, or hematemesis; No diarrhea or constipation. No melena or hematochezia.  GENITOURINARY: No dysuria, frequency, foamy urine, urinary urgency, incontinence or hematuria  NEUROLOGICAL: No numbness or weakness, No tremor    Musculoskeletal: no arthralgia, no myalgia  All other review of systems is negative unless indicated above.    VITALS:  Vital Signs Last 24 Hrs  T(C): 36.6 (28 Dec 2021 07:33), Max: 37.8 (27 Dec 2021 11:30)  T(F): 97.8 (28 Dec 2021 07:33), Max: 100 (27 Dec 2021 11:30)  HR: 87 (28 Dec 2021 07:) (71 - 112)  BP: 130/78 (28 Dec 2021 07:) (82/50 - 130/78)  BP(mean): --  RR: 20 (28 Dec 2021 07:33) (18 - 20)  SpO2: 98% (28 Dec 2021 07:) (94% - 98%)     @ 07:01  -   @ 07:00  --------------------------------------------------------  IN: 0 mL / OUT: 300 mL / NET: -300 mL          PHYSICAL EXAM:  Constitutional: NAD  HEENT: anicteric sclera, oropharynx clear, MMM  Neck: No JVD  Respiratory: good air entrance B/L, no wheezes, rales or rhonchi  Cardiovascular: S1, S2, RRR, no pericardial rub, no murmur  Gastrointestinal: BS+, soft, no tenderness, no distension, no bruit  Pelvis: bladder non-distended, no CVA tenderness  Extremities: No cyanosis or clubbing. No peripheral edema  Neurological: A/O x 3, no focal deficits  Psychiatric: Normal mood, normal affect  : No CVA tenderness. No michaud.   Skin: No rashes  Vascular: all pulses pre                     Chief complain/HPI    Patient is 89 years old female ( on wheel chair ) with past history of Right arthroplasty, chronic back pain, previous episodes of UTIs presented today to ED with AMS. Patient is very poor historian, so history was obtained by daughter. Patient was admitted before to [facility] for previous UTIs. Patient was seen by neurologist before for her dementia and she was told that is normal for age. Patient is taking overcounter meds for her back pain and heartburn. She mentioned that she was taking lasix for her fluid retention but stopped few months ago. The daughter wants her DNR/DNI.      She was admitted for Acute Encephalopathy with a background of dementia.  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 1000 milliLiter(s) (70 mL/Hr) IV Continuous <Continuous>    MEDICATIONS  (PRN):  acetaminophen     Tablet .. 650 milliGRAM(s) Oral every 6 hours PRN Mild Pain (1 - 3)      Allergies    sulfa drugs (Hives)    Intolerances    2020 CREATININE WAS 0.99  Comprehensive Metabolic Panel (12.26.21 @ 18:02)   Sodium, Serum: 146 mmol/L   Potassium, Serum: 4.3 mmol/L   Chloride, Serum: 116 mmol/L   Carbon Dioxide, Serum: 23 mmol/L   Anion Gap, Serum: 7 mmol/L   Blood Urea Nitrogen, Serum: 38 mg/dL   Creatinine, Serum: 1.65 mg/dL   Glucose, Serum: 122 mg/dL   Calcium, Total Serum: 9.6 mg/dL   Protein Total, Serum: 6.2 g/dL   Albumin, Serum: 2.5 g/dL   Bilirubin Total, Serum: 1.3 mg/dL   Alkaline Phosphatase, Serum: 164 U/L   Aspartate Aminotransferase (AST/SGOT): 81 U/L   Alanine Aminotransferase (ALT/SGPT): 59 U/L DA   eGFR if Non : 26: Interpretative comment                         9.8    18.53 )-----------( 138      ( 28 Dec 2021 07:26 )             30.7         149<H>  |  121<H>  |  52<H>  ----------------------------<  39<LL>  4.7   |  18<L>  |  2.01<H>    Ca    7.8<L>      28 Dec 2021 07:08  Phos  3.6       Mg     1.7         TPro  5.2<L>  /  Alb  1.7<L>  /  TBili  0.8  /  DBili  x   /  AST  29  /  ALT  33  /  AlkPhos  100      PT/INR - ( 27 Dec 2021 04:52 )   PT: 12.5 sec;   INR: 1.05 ratio         PTT - ( 27 Dec 2021 04:52 )  PTT:29.6 sec  Urinalysis Basic - ( 26 Dec 2021 20:36 )    Color: Red / Appearance: bloody / S.010 / pH: x  Gluc: x / Ketone: Negative  / Bili: Negative / Urobili: Negative   Blood: x / Protein: 100 / Nitrite: Positive   Leuk Esterase: Moderate / RBC: >50 /HPF / WBC >50 /HPF   Sq Epi: x / Non Sq Epi: Few /HPF / Bacteria: Moderate /HPF      Osmolality, Random Urine: 432 mos/kg ( @ 06:31)  Creatinine, Random Urine: 122 mg/dL ( @ 06:31)  Sodium, Random Urine: 57 mmol/L ( @ 06:31)    UTI - >100,000 CFU/ml Gram Negative Rods     RADIOLOGY & ADDITIONAL STUDIES:    SOCIAL HISTORY: Denies ETOh,Smoking,     FAMILY HISTORY:      REVIEW OF SYSTEMS:  DEMENTIA.    Musculoskeletal: no arthralgia, no myalgia  All other review of systems is negative unless indicated above.    VITALS:  Vital Signs Last 24 Hrs  T(C): 36.6 (28 Dec 2021 07:33), Max: 37.8 (27 Dec 2021 11:30)  T(F): 97.8 (28 Dec 2021 07:33), Max: 100 (27 Dec 2021 11:30)  HR: 87 (28 Dec 2021 07:33) (71 - 112)  BP: 130/78 (28 Dec 2021 07:33) (82/50 - 130/78)  BP(mean): --  RR: 20 (28 Dec 2021 07:33) (18 - 20)  SpO2: 98% (28 Dec 2021 07:33) (94% - 98%)     @ 07:01  -   @ 07:00  --------------------------------------------------------  IN: 0 mL / OUT: 300 mL / NET: -300 mL          PHYSICAL EXAM:  Constitutional: NAD  HEENT: anicteric sclera, oropharynx clear, MMM  Neck: No JVD  Respiratory: good air entrance B/L, no wheezes, rales or rhonchi  Cardiovascular: S1, S2, RRR, no pericardial rub, no murmur  Gastrointestinal: BS+, soft, no tenderness, no distension, no bruit  Pelvis: bladder non-distended, no CVA tenderness  Extremities: No cyanosis or clubbing. No peripheral edema  No response to verbal stimuli

## 2021-12-28 NOTE — PROGRESS NOTE ADULT - PROBLEM SELECTOR PLAN 1
- Patient with mild dementia at baseline presented with AMS. As per conversation with daughter today pt MS waxes and wanes  - UA is positive   - previously on Rocephin, now on cefepime  - NPO for now  - urine culture growing proteous  - BCX NGTD  - Fall precautions   - Aspiration precautions

## 2021-12-28 NOTE — PROGRESS NOTE ADULT - SUBJECTIVE AND OBJECTIVE BOX
CHIEF COMPLAINT:Patient is a 89y old  Female who presents with a chief complaint of Altered mental status.Pt appears comfortable.    	  REVIEW OF SYSTEMS:    [x ] Unable to obtain    PHYSICAL EXAM:  T(C): 36.6 (12-28-21 @ 07:33), Max: 37.8 (12-27-21 @ 11:30)  HR: 87 (12-28-21 @ 07:33) (71 - 112)  BP: 130/78 (12-28-21 @ 07:33) (82/50 - 130/78)  RR: 20 (12-28-21 @ 07:33) (18 - 20)  SpO2: 98% (12-28-21 @ 07:33) (94% - 98%)  Wt(kg): --  I&O's Summary    27 Dec 2021 07:01  -  28 Dec 2021 07:00  --------------------------------------------------------  IN: 0 mL / OUT: 300 mL / NET: -300 mL        Appearance: Normal	  HEENT:   Normal oral mucosa, PERRL, EOMI	  Lymphatic: No lymphadenopathy  Cardiovascular: Normal S1 S2, No JVD, No murmurs, No edema  Respiratory: Lungs clear to auscultation	  Gastrointestinal:  Soft, Non-tender, + BS	  Skin: No rashes, No ecchymoses, No cyanosis	  Extremities: Normal range of motion, No clubbing, cyanosis or edema  Vascular: Peripheral pulses palpable 2+ bilaterally    MEDICATIONS  (STANDING):  cefepime   IVPB      cefepime   IVPB 500 milliGRAM(s) IV Intermittent every 24 hours  pantoprazole  Injectable 40 milliGRAM(s) IV Push at bedtime  sodium bicarbonate 650 milliGRAM(s) Oral three times a day  sodium chloride 0.45%. 1000 milliLiter(s) (60 mL/Hr) IV Continuous <Continuous>      TELEMETRY: 	nsr,pac's    	  	  LABS:	 	      CARDIAC MARKERS ( 26 Dec 2021 18:02 )  x     / x     / 95 U/L / x     / x          Troponin I, High Sensitivity Result: 568.2 ng/L (12-28 @ 07:08)  Troponin I, High Sensitivity Result: 589.2 ng/L (12-28 @ 03:29)  Troponin I, High Sensitivity Result: 395.6 ng/L (12-27 @ 14:42)  Troponin I, High Sensitivity Result: 335.8 ng/L (12-27 @ 04:53)  Troponin I, High Sensitivity Result: 93.3 ng/L (12-26 @ 18:02)                            9.8    18.53 )-----------( 138      ( 28 Dec 2021 07:26 )             30.7     12-28    149<H>  |  121<H>  |  52<H>  ----------------------------<  39<LL>  4.7   |  18<L>  |  2.01<H>    Ca    7.8<L>      28 Dec 2021 07:08  Phos  3.6     12-28  Mg     1.7     12-28    TPro  5.2<L>  /  Alb  1.7<L>  /  TBili  0.8  /  DBili  x   /  AST  29  /  ALT  33  /  AlkPhos  100  12-28      	        < from: CT Abdomen and Pelvis No Cont (12.26.21 @ 20:03) >  ACC: [id number] EXAM:  CT ABDOMEN AND PELVIS                          PROCEDURE DATE:  12/26/2021          INTERPRETATION:  CLINICAL INFORMATION: Altered mental status, back pain.    COMPARISON: None.    CONTRAST/COMPLICATIONS:  IV Contrast: NONE  Oral Contrast: NONE  Complications: None reported at time of study completion    PROCEDURE:  CT of the Abdomen and Pelvis was performed.  Sagittal and coronal reformats were performed.    FINDINGS:  Evaluation of solid organs and vascular structures is limited without   intravenous contrast. Exam is degraded by motion artifact.    LOWER CHEST: Bibasilar subsegmental atelectasis. Trace left pleural   fluid. Cardiomegaly. Aortic valve and mitral valve annular   calcifications. Aortic and coronary artery atherosclerosis. Ectasia of   the ascending aorta to 4.0 cm.    LIVER: Within normal limits.  BILE DUCTS: Normal caliber.  GALLBLADDER: Within normal limits.  SPLEEN: Within normal limits.  PANCREAS: Atrophic.  ADRENALS: Bilateral adrenal gland thickening.  KIDNEYS/URETERS: Bilateral renal cysts. 2.9 cm stone and 1.3 cm stone at   the level of the left renal pelvis, possibly extending into the UPJ,   difficult to evaluate. At least mild left hydronephrosis. Difficult to   evaluate lower pole hydronephrosis due to possible parapelvic cyst in   this region. Left perinephric stranding.    BLADDER: Within normal limits.  REPRODUCTIVE ORGANS: Limited evaluation due to streak artifact from hip   hardware. Grossly within normal limits.    BOWEL: No bowel obstruction. Large colonic stool burden in the rectum.   Colonic diverticulosis. Normal appendix.  PERITONEUM: No ascites.  VESSELS: Atherosclerotic changes.  RETROPERITONEUM/LYMPH NODES: No lymphadenopathy.  ABDOMINAL WALL: Tiny fat-containing umbilical hernia. Right pelvic hernia   containing colon, appendix and terminal ileum. Left pelvic hernia   containing nonobstructed loops of small bowel.  BONES: Age-indeterminate mildly displaced fracture of the posterolateral   right 10th rib (2,55). Right hip arthroplasty. Chronic fracture   deformities of the right superior and inferior rami. Sacral perineural   cyst. Degenerative changes. Grade 2 anterolisthesis at L5-S1.    IMPRESSION:  Significantly limited exam due to motion artifact and noncontrast   technique.    2.9 cm stone and 1.3 cm stone at the level of the left renal pelvis   possibly extending into the UPJ, difficult to evaluate. At least mild   left upper pole hydronephrosis and indeterminate lower pole   hydronephrosis secondary to possible parapelvic cyst in this location.   Mild left perinephric stranding. Recommend correlation with urinalysis.    Age-indeterminate mildly displaced fracture of the posterolateral right   10th rib.            --- End of Report ---            YARIEL SALAZAR MD; Attending Radiologist  This document has been electronically signed. Dec 26 2021  8:33PM    < end of copied text >

## 2021-12-28 NOTE — CONSULT NOTE ADULT - ASSESSMENT
89 years old admitted with PATRICK and gram negative UTI, pyelonephritis.   left hydronephrosis with a calculi  UPJ area. Thinning of  left renal parenchyma  due to hydronephrosis with perinephritic strnading  Leukocytosis associated due to the above and on Cefepime      Deterioration in renal function due to the above, left hydronephrosis and pyelonephritis.    Hypernatremia , possible cause using NS    Add sodium bicarbonate P;O   Follow 2 gm K diet

## 2021-12-28 NOTE — PROGRESS NOTE ADULT - SUBJECTIVE AND OBJECTIVE BOX
PGY-1 Progress Note discussed with attending    PAGER #: [[phone]] TILL 5:00 PM  PLEASE CONTACT ON CALL TEAM:  - On Call Team (Please refer to [facility]) FROM 5:00 PM - 8:30PM  - Nightfloat Team FROM 8:30 -7:30 AM    CHIEF COMPLAINT & BRIEF HOSPITAL COURSE:    INTERVAL HPI/OVERNIGHT EVENTS:       REVIEW OF SYSTEMS:  CONSTITUTIONAL: No fever, weight loss, or fatigue  RESPIRATORY: No cough, wheezing, chills or hemoptysis; No shortness of breath  CARDIOVASCULAR: No chest pain, palpitations, dizziness, or leg swelling  GASTROINTESTINAL: No abdominal pain. No nausea, vomiting, or hematemesis; No diarrhea or constipation. No melena or hematochezia.  GENITOURINARY: No dysuria or hematuria, urinary frequency  NEUROLOGICAL: No headaches, memory loss, loss of strength, numbness, or tremors  SKIN: No itching, burning, rashes, or lesions     Vital Signs Last 24 Hrs  T(C): 36.6 (28 Dec 2021 11:16), Max: 37.1 (27 Dec 2021 23:44)  T(F): 97.8 (28 Dec 2021 11:16), Max: 98.7 (27 Dec 2021 23:44)  HR: 94 (28 Dec 2021 11:16) (71 - 101)  BP: 148/81 (28 Dec 2021 11:16) (82/50 - 148/81)  BP(mean): --  RR: 20 (28 Dec 2021 11:16) (18 - 20)  SpO2: 96% (28 Dec 2021 11:16) (94% - 98%)    PHYSICAL EXAMINATION:  GENERAL: NAD, well built  HEAD:  Atraumatic, Normocephalic  EYES:  conjunctiva and sclera clear  NECK: Supple, No JVD, Normal thyroid  CHEST/LUNG: Clear to auscultation. Clear to percussion bilaterally; No rales, rhonchi, wheezing, or rubs  HEART: Regular rate and rhythm; No murmurs, rubs, or gallops  ABDOMEN: Soft, Nontender, Nondistended; Bowel sounds present  NERVOUS SYSTEM:  Alert & Oriented X3,    EXTREMITIES:  2+ Peripheral Pulses, No clubbing, cyanosis, or edema  SKIN: warm dry                          9.8    18.53 )-----------( 138      ( 28 Dec 2021 07:26 )             30.7     12-28    149<H>  |  121<H>  |  52<H>  ----------------------------<  39<LL>  4.7   |  18<L>  |  2.01<H>    Ca    7.8<L>      28 Dec 2021 07:08  Phos  3.6     12-28  Mg     1.7     12-28    TPro  5.2<L>  /  Alb  1.7<L>  /  TBili  0.8  /  DBili  x   /  AST  29  /  ALT  33  /  AlkPhos  100  12-28    LIVER FUNCTIONS - ( 28 Dec 2021 07:08 )  Alb: 1.7 g/dL / Pro: 5.2 g/dL / ALK PHOS: 100 U/L / ALT: 33 U/L DA / AST: 29 U/L / GGT: x           CARDIAC MARKERS ( 26 Dec 2021 18:02 )  x     / x     / 95 U/L / x     / x          PT/INR - ( 27 Dec 2021 04:52 )   PT: 12.5 sec;   INR: 1.05 ratio         PTT - ( 27 Dec 2021 04:52 )  PTT:29.6 sec    CAPILLARY BLOOD GLUCOSE      RADIOLOGY & ADDITIONAL TESTS:                   PGY-1 Progress Note discussed with attending    PAGER #: [[phone]] TILL 5:00 PM  PLEASE CONTACT ON CALL TEAM:  - On Call Team (Please refer to [facility]) FROM 5:00 PM - 8:30PM  - Nightfloat Team FROM 8:30 -7:30 AM    INTERVAL HPI/OVERNIGHT EVENTS:  Pt was agitated overnight and received 1 mg of ativan. Seen and examined at bedside,  pt obtuned, Responds to painful  stimulus. Cannot assess for ROS given MS         Vital Signs Last 24 Hrs  T(C): 36.6 (28 Dec 2021 11:16), Max: 37.1 (27 Dec 2021 23:44)  T(F): 97.8 (28 Dec 2021 11:16), Max: 98.7 (27 Dec 2021 23:44)  HR: 94 (28 Dec 2021 11:16) (71 - 101)  BP: 148/81 (28 Dec 2021 11:16) (82/50 - 148/81)  BP(mean): --  RR: 20 (28 Dec 2021 11:16) (18 - 20)  SpO2: 96% (28 Dec 2021 11:16) (94% - 98%)    PHYSICAL EXAMINATION:  GENERAL: NAD, thin  HEAD:  Atraumatic, Normocephalic  EYES:  conjunctiva and sclera clear  NECK: Supple, No JVD  CHEST/LUNG: Clear to auscultation. Clear to percussion bilaterally; No rales, rhonchi, wheezing, or rubs  HEART: Regular rate and rhythm; No murmurs, rubs, or gallops  ABDOMEN: Soft, Nontender, Nondistended; Bowel sounds present  NERVOUS SYSTEM:  Alert & Oriented X0,    EXTREMITIES:  2+ Peripheral Pulses, No clubbing, cyanosis, or edema  SKIN: warm dry                          9.8    18.53 )-----------( 138      ( 28 Dec 2021 07:26 )             30.7     12-28    149<H>  |  121<H>  |  52<H>  ----------------------------<  39<LL>  4.7   |  18<L>  |  2.01<H>    Ca    7.8<L>      28 Dec 2021 07:08  Phos  3.6     12-28  Mg     1.7     12-28    TPro  5.2<L>  /  Alb  1.7<L>  /  TBili  0.8  /  DBili  x   /  AST  29  /  ALT  33  /  AlkPhos  100  12-28    LIVER FUNCTIONS - ( 28 Dec 2021 07:08 )  Alb: 1.7 g/dL / Pro: 5.2 g/dL / ALK PHOS: 100 U/L / ALT: 33 U/L DA / AST: 29 U/L / GGT: x           CARDIAC MARKERS ( 26 Dec 2021 18:02 )  x     / x     / 95 U/L / x     / x          PT/INR - ( 27 Dec 2021 04:52 )   PT: 12.5 sec;   INR: 1.05 ratio         PTT - ( 27 Dec 2021 04:52 )  PTT:29.6 sec    CAPILLARY BLOOD GLUCOSE      RADIOLOGY & ADDITIONAL TESTS:

## 2021-12-28 NOTE — PROGRESS NOTE ADULT - SUBJECTIVE AND OBJECTIVE BOX
Patient is seen and examined at the bed side, is afebrile.  The WBC count stay elevated.  The Blood cultures have no growth to date and Urine culture grew Proteus.      REVIEW OF SYSTEMS: Unable to obtain due to mental status      ALLERGIES: sulfa drugs (Hives)      Vital Signs Last 24 Hrs  T(C): 36.3 (28 Dec 2021 15:32), Max: 37.1 (27 Dec 2021 23:44)  T(F): 97.3 (28 Dec 2021 15:32), Max: 98.7 (27 Dec 2021 23:44)  HR: 77 (28 Dec 2021 15:32) (71 - 101)  BP: 132/79 (28 Dec 2021 15:32) (90/54 - 148/81)  BP(mean): --  RR: 20 (28 Dec 2021 15:32) (18 - 20)  SpO2: 97% (28 Dec 2021 15:32) (95% - 98%)      PHYSICAL EXAM:  GENERAL: Not in distress   CHEST/LUNG:  Not using accessory muscles  HEART: s1 and s2 present  ABDOMEN:  Nontender and  Nondistended  EXTREMITIES: No pedal  edema  CNS: Confused , and screaming intermittently       LABS:                                   9.8    18.53 )-----------( 138      ( 28 Dec 2021 07:26 )             30.7                  10.8   18.34 )-----------( 160      ( 27 Dec 2021 14:42 )             33.8           149<H>  |  121<H>  |  52<H>  ----------------------------<  39<LL>  4.7   |  18<L>  |  2.01<H>    Ca    7.8<L>      28 Dec 2021 07:08  Phos  3.6       Mg     1.7         TPro  5.2<L>  /  Alb  1.7<L>  /  TBili  0.8  /  DBili  x   /  AST  29  /  ALT  33  /  AlkPhos  100      147<H>  |  122<H>  |  47<H>  ----------------------------<  72  4.2   |  17<L>  |  1.90<H>    Ca    8.2<L>      27 Dec 2021 14:42  Phos  2.5       Mg     1.7         TPro  5.3<L>  /  Alb  1.9<L>  /  TBili  0.9  /  DBili  x   /  AST  38  /  ALT  39  /  AlkPhos  104  12    PT/INR - ( 27 Dec 2021 04:52 )   PT: 12.5 sec;   INR: 1.05 ratio    PTT - ( 27 Dec 2021 04:52 )  PTT:29.6 sec        CAPILLARY BLOOD GLUCOSE  POCT Blood Glucose.: 80 mg/dL (27 Dec 2021 04:17)        Urinalysis Basic - ( 26 Dec 2021 20:36 )  Color: Red / Appearance: bloody / S.010 / pH: x  Gluc: x / Ketone: Negative  / Bili: Negative / Urobili: Negative   Blood: x / Protein: 100 / Nitrite: Positive   Leuk Esterase: Moderate / RBC: >50 /HPF / WBC >50 /HPF   Sq Epi: x / Non Sq Epi: Few /HPF / Bacteria: Moderate /HPF        MEDICATIONS  (STANDING):  cefepime   IVPB      cefepime   IVPB 500 milliGRAM(s) IV Intermittent every 24 hours  heparin   Injectable 5000 Unit(s) SubCutaneous every 8 hours  pantoprazole  Injectable 40 milliGRAM(s) IV Push at bedtime  sodium bicarbonate 650 milliGRAM(s) Oral three times a day  sodium chloride 0.45%. 1000 milliLiter(s) (60 mL/Hr) IV Continuous <Continuous>      RADIOLOGY & ADDITIONAL TESTS:    21: CT Abdomen and Pelvis No Cont (21 @ 20:03) Significantly limited exam due to motion artifact and noncontrast technique.    2.9 cm stone and 1.3 cm stone at the level of the left renal pelvis  possibly extending into the UPJ, difficult to evaluate. At least mild   left upper pole hydronephrosis and indeterminate lower pole hydronephrosis secondary to possible parapelvic cyst in this location.   Mild left perinephric stranding. Recommend correlation with urinalysis.    21: Xray Chest 1 View-PORTABLE IMMEDIATE (Xray Chest 1 View-PORTABLE IMMEDIATE .) (21 @ 20:52) Right perihilar opacity, differential diagnosis includes focal  consolidation and aortic aneurysm      MICROBIOLOGY DATA:    Culture - Blood (21 @ 12:43)   Specimen Source: .Blood Blood-Peripheral   Culture Results: No growth to date.     Culture - Blood (21 @ 12:43)   Specimen Source: .Blood Blood-Peripheral   Culture Results: No growth to date.     Culture - Urine (21 @ 03:36)   Specimen Source: Clean Catch Clean Catch (Midstream)   Culture Results: >100,000 CFU/ml Proteus vulgaris group     COVID-19 PCR . (21 @ 20:36)   COVID-19 PCR: NotDetec:

## 2021-12-29 LAB
-  AMIKACIN: SIGNIFICANT CHANGE UP
-  AMOXICILLIN/CLAVULANIC ACID: SIGNIFICANT CHANGE UP
-  AMPICILLIN/SULBACTAM: SIGNIFICANT CHANGE UP
-  AMPICILLIN: SIGNIFICANT CHANGE UP
-  AZTREONAM: SIGNIFICANT CHANGE UP
-  CEFAZOLIN: SIGNIFICANT CHANGE UP
-  CEFEPIME: SIGNIFICANT CHANGE UP
-  CEFOXITIN: SIGNIFICANT CHANGE UP
-  CEFTRIAXONE: SIGNIFICANT CHANGE UP
-  CIPROFLOXACIN: SIGNIFICANT CHANGE UP
-  ERTAPENEM: SIGNIFICANT CHANGE UP
-  GENTAMICIN: SIGNIFICANT CHANGE UP
-  LEVOFLOXACIN: SIGNIFICANT CHANGE UP
-  MEROPENEM: SIGNIFICANT CHANGE UP
-  NITROFURANTOIN: SIGNIFICANT CHANGE UP
-  PIPERACILLIN/TAZOBACTAM: SIGNIFICANT CHANGE UP
-  TIGECYCLINE: SIGNIFICANT CHANGE UP
-  TOBRAMYCIN: SIGNIFICANT CHANGE UP
-  TRIMETHOPRIM/SULFAMETHOXAZOLE: SIGNIFICANT CHANGE UP
CULTURE RESULTS: SIGNIFICANT CHANGE UP
GLUCOSE BLDC GLUCOMTR-MCNC: 47 MG/DL — CRITICAL LOW (ref 70–99)
GLUCOSE BLDC GLUCOMTR-MCNC: 90 MG/DL — SIGNIFICANT CHANGE UP (ref 70–99)
GLUCOSE BLDC GLUCOMTR-MCNC: 93 MG/DL — SIGNIFICANT CHANGE UP (ref 70–99)
METHOD TYPE: SIGNIFICANT CHANGE UP
MRSA PCR RESULT.: SIGNIFICANT CHANGE UP
ORGANISM # SPEC MICROSCOPIC CNT: SIGNIFICANT CHANGE UP
ORGANISM # SPEC MICROSCOPIC CNT: SIGNIFICANT CHANGE UP
S AUREUS DNA NOSE QL NAA+PROBE: SIGNIFICANT CHANGE UP
SPECIMEN SOURCE: SIGNIFICANT CHANGE UP

## 2021-12-29 RX ORDER — SODIUM CHLORIDE 9 MG/ML
1000 INJECTION, SOLUTION INTRAVENOUS
Refills: 0 | Status: DISCONTINUED | OUTPATIENT
Start: 2021-12-29 | End: 2021-12-30

## 2021-12-29 RX ORDER — DEXTROSE 50 % IN WATER 50 %
25 SYRINGE (ML) INTRAVENOUS ONCE
Refills: 0 | Status: COMPLETED | OUTPATIENT
Start: 2021-12-29 | End: 2021-12-29

## 2021-12-29 RX ADMIN — Medication 400 MILLIGRAM(S): at 00:49

## 2021-12-29 RX ADMIN — SODIUM CHLORIDE 60 MILLILITER(S): 9 INJECTION, SOLUTION INTRAVENOUS at 08:29

## 2021-12-29 RX ADMIN — Medication 25 GRAM(S): at 08:28

## 2021-12-29 RX ADMIN — HEPARIN SODIUM 5000 UNIT(S): 5000 INJECTION INTRAVENOUS; SUBCUTANEOUS at 22:37

## 2021-12-29 RX ADMIN — CEFEPIME 100 MILLIGRAM(S): 1 INJECTION, POWDER, FOR SOLUTION INTRAMUSCULAR; INTRAVENOUS at 05:48

## 2021-12-29 RX ADMIN — HEPARIN SODIUM 5000 UNIT(S): 5000 INJECTION INTRAVENOUS; SUBCUTANEOUS at 13:04

## 2021-12-29 RX ADMIN — Medication 1000 MILLIGRAM(S): at 01:15

## 2021-12-29 RX ADMIN — HEPARIN SODIUM 5000 UNIT(S): 5000 INJECTION INTRAVENOUS; SUBCUTANEOUS at 05:49

## 2021-12-29 RX ADMIN — PANTOPRAZOLE SODIUM 40 MILLIGRAM(S): 20 TABLET, DELAYED RELEASE ORAL at 22:37

## 2021-12-29 NOTE — PROGRESS NOTE ADULT - PROBLEM SELECTOR PLAN 1
- Patient with mild dementia at baseline presented with AMS. As per conversation with daughter today pt MS waxes and wanes  - UA is positive   - previously on Rocephin, now on cefepime  - NPO for now   - urine culture growing proteous  - BCX NGTD  - Fall precautions   - Aspiration precautions  - palliative Dr. Davidson consulted

## 2021-12-29 NOTE — PROGRESS NOTE ADULT - SUBJECTIVE AND OBJECTIVE BOX
Patient is seen and examined at the bed side, is afebrile.  The sensitivities of Proteus in Urine culture has been reviewed.      REVIEW OF SYSTEMS: Unable to obtain due to mental status      ALLERGIES: sulfa drugs (Hives)      Vital Signs Last 24 Hrs  T(C): 36.3 (29 Dec 2021 19:31), Max: 36.7 (28 Dec 2021 23:30)  T(F): 97.4 (29 Dec 2021 19:31), Max: 98.1 (28 Dec 2021 23:30)  HR: 67 (29 Dec 2021 19:31) (67 - 82)  BP: 118/64 (29 Dec 2021 19:31) (116/71 - 162/75)  BP(mean): 78 (29 Dec 2021 19:31) (78 - 78)  RR: 18 (29 Dec 2021 19:31) (18 - 20)  SpO2: 98% (29 Dec 2021 19:31) (95% - 98%)      PHYSICAL EXAM:  GENERAL: Not in distress   CHEST/LUNG:  Not using accessory muscles  HEART: s1 and s2 present  ABDOMEN:  Nontender and  Nondistended  EXTREMITIES: No pedal  edema  CNS: Confused , and screaming intermittently       LABS:  No new Labs                        9.8    18.53 )-----------( 138      ( 28 Dec 2021 07:26 )             30.7                10.8   18.34 )-----------( 160      ( 27 Dec 2021 14:42 )             33.8           149<H>  |  121<H>  |  52<H>  ----------------------------<  39<LL>  4.7   |  18<L>  |  2.01<H>    Ca    7.8<L>      28 Dec 2021 07:08  Phos  3.6       Mg     1.7         TPro  5.2<L>  /  Alb  1.7<L>  /  TBili  0.8  /  DBili  x   /  AST  29  /  ALT  33  /  AlkPhos  100      147<H>  |  122<H>  |  47<H>  ----------------------------<  72  4.2   |  17<L>  |  1.90<H>    Ca    8.2<L>      27 Dec 2021 14:42  Phos  2.5       Mg     1.7         TPro  5.3<L>  /  Alb  1.9<L>  /  TBili  0.9  /  DBili  x   /  AST  38  /  ALT  39  /  AlkPhos  104      PT/INR - ( 27 Dec 2021 04:52 )   PT: 12.5 sec;   INR: 1.05 ratio    PTT - ( 27 Dec 2021 04:52 )  PTT:29.6 sec        CAPILLARY BLOOD GLUCOSE  POCT Blood Glucose.: 80 mg/dL (27 Dec 2021 04:17)        Urinalysis Basic - ( 26 Dec 2021 20:36 )  Color: Red / Appearance: bloody / S.010 / pH: x  Gluc: x / Ketone: Negative  / Bili: Negative / Urobili: Negative   Blood: x / Protein: 100 / Nitrite: Positive   Leuk Esterase: Moderate / RBC: >50 /HPF / WBC >50 /HPF   Sq Epi: x / Non Sq Epi: Few /HPF / Bacteria: Moderate /HPF        MEDICATIONS  (STANDING    cefepime   IVPB      cefepime   IVPB 500 milliGRAM(s) IV Intermittent every 24 hours  dextrose 5% + sodium chloride 0.45%. 1000 milliLiter(s) (60 mL/Hr) IV Continuous <Continuous>  heparin   Injectable 5000 Unit(s) SubCutaneous every 8 hours  pantoprazole  Injectable 40 milliGRAM(s) IV Push at bedtime  sodium bicarbonate 650 milliGRAM(s) Oral three times a day    RADIOLOGY & ADDITIONAL TESTS:    21: CT Abdomen and Pelvis No Cont (21 @ 20:03) Significantly limited exam due to motion artifact and noncontrast technique.    2.9 cm stone and 1.3 cm stone at the level of the left renal pelvis  possibly extending into the UPJ, difficult to evaluate. At least mild   left upper pole hydronephrosis and indeterminate lower pole hydronephrosis secondary to possible parapelvic cyst in this location.   Mild left perinephric stranding. Recommend correlation with urinalysis.    21: Xray Chest 1 View-PORTABLE IMMEDIATE (Xray Chest 1 View-PORTABLE IMMEDIATE .) (21 @ 20:52) Right perihilar opacity, differential diagnosis includes focal  consolidation and aortic aneurysm      MICROBIOLOGY DATA:    Culture - Urine (21 @ 03:36)   - Trimethoprim/Sulfamethoxazole: S <=0.5/9.5   - Cefepime: S <=2   - Cefoxitin: S <=8   - Ceftriaxone: S <=1 Enterobacter, Klebsiella aerogenes, Citrobacter, and Serratia may develop resistance during prolonged therapy   - Ciprofloxacin: S <=0.25   - Ertapenem: S <=0.5   - Gentamicin: S <=2   - Levofloxacin: S <=0.5   - Meropenem: S <=1   - Nitrofurantoin: R >64 Should not be used to treat pyelonephritis   - Piperacillin/Tazobactam: S <=8   - Tigecycline: R <=2   - Tobramycin: S 4   - Amikacin: S <=16   - Amoxicillin/Clavulanic Acid: S <=8/4   - Ampicillin: R >16 These ampicillin results predict results for amoxicillin   - Ampicillin/Sulbactam: S <=4/2 Enterobacter, Klebsiella aerogenes, Citrobacter, and Serratia may develop resistance during prolonged therapy (3-4 days)   - Aztreonam: S <=4   - Cefazolin: R >16   Specimen Source: Clean Catch Clean Catch (Midstream)   Culture Results:   >100,000 CFU/ml Proteus vulgaris group   Organism Identification: Proteus vulgaris group   Culture - Blood (21 @ 12:43)   Specimen Source: .Blood Blood-Peripheral   Culture Results: No growth to date.     Culture - Blood (21 @ 12:43)   Specimen Source: .Blood Blood-Peripheral   Culture Results: No growth to date.     Culture - Urine (21 @ 03:36)   Specimen Source: Clean Catch Clean Catch (Midstream)   Culture Results: >100,000 CFU/ml Proteus vulgaris group     COVID-19 PCR . (21 @ 20:36)   COVID-19 PCR: NotDetec:

## 2021-12-29 NOTE — ADVANCED PRACTICE NURSE CONSULT - ASSESSMENT
This is a 89yr old female patient admitted for NSTEMI, presenting with evidence of blanchable erythema to the Bilateral Heels and Coccyx

## 2021-12-29 NOTE — DIETITIAN INITIAL EVALUATION ADULT. - PROBLEM SELECTOR PLAN 4
- Could be poor oral intake; Prerenal?  - Will get U lytes   - Will start her on fluids IV plan per MD

## 2021-12-29 NOTE — DIETITIAN INITIAL EVALUATION ADULT. - PERTINENT LABORATORY DATA
12-28 Na149 mmol/L<H> Glu 39 mg/dL<LL> K+ 4.7 mmol/L Cr  2.01 mg/dL<H> BUN 52 mg/dL<H>   12-28 Phos 3.6 mg/dL   12-28 Alb 1.7 g/dL<L>

## 2021-12-29 NOTE — PROGRESS NOTE ADULT - SUBJECTIVE AND OBJECTIVE BOX
INTERVAL HPI/OVERNIGHT EVENTS:  pt seen and examined. resting comfortably  michaud with clear urine  afebrile     MEDICATIONS  (STANDING):  cefepime   IVPB      cefepime   IVPB 500 milliGRAM(s) IV Intermittent every 24 hours  dextrose 5% + sodium chloride 0.45%. 1000 milliLiter(s) (60 mL/Hr) IV Continuous <Continuous>  heparin   Injectable 5000 Unit(s) SubCutaneous every 8 hours  pantoprazole  Injectable 40 milliGRAM(s) IV Push at bedtime  sodium bicarbonate 650 milliGRAM(s) Oral three times a day    MEDICATIONS  (PRN):  acetaminophen     Tablet .. 650 milliGRAM(s) Oral every 6 hours PRN Mild Pain (1 - 3)    Vital Signs Last 24 Hrs  T(C): 36.1 (29 Dec 2021 07:30), Max: 36.7 (28 Dec 2021 23:30)  T(F): 97 (29 Dec 2021 07:30), Max: 98.1 (28 Dec 2021 23:30)  HR: 78 (29 Dec 2021 07:30) (77 - 82)  BP: 128/71 (29 Dec 2021 07:30) (116/71 - 143/82)  RR: 19 (29 Dec 2021 07:30) (19 - 20)  SpO2: 98% (29 Dec 2021 07:30) (97% - 98%)    Physical:  General: awake, confused, NAD  Chest: respiration unlabored  CVAT unable to be assessed as patient refusing further examination  : michaud with clear urine    I&O's Detail    28 Dec 2021 07:01  -  29 Dec 2021 07:00  --------------------------------------------------------  IN:  Total IN: 0 mL    OUT:    Indwelling Catheter - Urethral (mL): 600 mL    Voided (mL): 375 mL  Total OUT: 975 mL    Total NET: -975 mL    LABS:                        9.8    18.53 )-----------( 138      ( 28 Dec 2021 07:26 )             30.7             12-28    149<H>  |  121<H>  |  52<H>  ----------------------------<  39<LL>  4.7   |  18<L>  |  2.01<H>    Ca    7.8<L>      28 Dec 2021 07:08  Phos  3.6     12-28  Mg     1.7     12-28    TPro  5.2<L>  /  Alb  1.7<L>  /  TBili  0.8  /  DBili  x   /  AST  29  /  ALT  33  /  AlkPhos  100  12-28

## 2021-12-29 NOTE — DIETITIAN INITIAL EVALUATION ADULT. - PROBLEM SELECTOR PLAN 3
- EKG showed T wave abnormalities; NSTEMI  - Patient has poor oral intake; Will start her on IV fluids 0.9% NS 70cc/hr   - Will repeat another troponin AM plan per MD

## 2021-12-29 NOTE — DIETITIAN INITIAL EVALUATION ADULT. - OTHER INFO
Pt lives home  PTA, confused, lethargy; s/p RRT 12/27/21 for hypotension; poor oral intake PTA x ? duration; NPO x 3 to 4d, unclear recent wt changes

## 2021-12-29 NOTE — DIETITIAN INITIAL EVALUATION ADULT. - PERTINENT MEDS FT
MEDICATIONS  (STANDING):  cefepime   IVPB      cefepime   IVPB 500 milliGRAM(s) IV Intermittent every 24 hours  dextrose 5% + sodium chloride 0.45%. 1000 milliLiter(s) (60 mL/Hr) IV Continuous <Continuous>  heparin   Injectable 5000 Unit(s) SubCutaneous every 8 hours  pantoprazole  Injectable 40 milliGRAM(s) IV Push at bedtime  sodium bicarbonate 650 milliGRAM(s) Oral three times a day

## 2021-12-29 NOTE — DIETITIAN INITIAL EVALUATION ADULT. - DIET TYPE
Advance diet or consider alternate nutrition support if NPO prolonged further as medically feasible and if consistent with Goal of Care

## 2021-12-29 NOTE — ADVANCED PRACTICE NURSE CONSULT - RECOMMEDATIONS
-Apply a Foam dressing to the Coccyx area Q 72hrs PRN  -Elevate/float the patients heels using heel protectors and reposition the patient Q 2hrs using wedges or pillows

## 2021-12-29 NOTE — PROGRESS NOTE ADULT - SUBJECTIVE AND OBJECTIVE BOX
CHIEF COMPLAINT:Patient is a 89y old  Female who presents with a chief complaint of Altered mental status.Pt appears comfortable.    	  REVIEW OF SYSTEMS:    [ x] Unable to obtain    PHYSICAL EXAM:  T(C): 36.1 (12-29-21 @ 07:30), Max: 36.7 (12-28-21 @ 23:30)  HR: 78 (12-29-21 @ 07:30) (77 - 94)  BP: 128/71 (12-29-21 @ 07:30) (116/71 - 148/81)  RR: 19 (12-29-21 @ 07:30) (19 - 20)  SpO2: 98% (12-29-21 @ 07:30) (96% - 98%)  Wt(kg): --  I&O's Summary    28 Dec 2021 07:01  -  29 Dec 2021 07:00  --------------------------------------------------------  IN: 0 mL / OUT: 975 mL / NET: -975 mL        Appearance: Normal	  HEENT:   Normal oral mucosa, PERRL, EOMI	  Lymphatic: No lymphadenopathy  Cardiovascular: Normal S1 S2, No JVD, No murmurs, No edema  Respiratory: Lungs clear to auscultation	  Gastrointestinal:  Soft, Non-tender, + BS	  Skin: No rashes, No ecchymoses, No cyanosis	  Extremities: Normal range of motion, No clubbing, cyanosis or edema  Vascular: Peripheral pulses palpable 2+ bilaterally    MEDICATIONS  (STANDING):  cefepime   IVPB      cefepime   IVPB 500 milliGRAM(s) IV Intermittent every 24 hours  dextrose 5% + sodium chloride 0.45%. 1000 milliLiter(s) (60 mL/Hr) IV Continuous <Continuous>  heparin   Injectable 5000 Unit(s) SubCutaneous every 8 hours  pantoprazole  Injectable 40 milliGRAM(s) IV Push at bedtime  sodium bicarbonate 650 milliGRAM(s) Oral three times a day    	  	  LABS:	 	    Troponin I, High Sensitivity Result: 568.2 ng/L (12-28 @ 07:08)  Troponin I, High Sensitivity Result: 589.2 ng/L (12-28 @ 03:29)  Troponin I, High Sensitivity Result: 395.6 ng/L (12-27 @ 14:42)  Troponin I, High Sensitivity Result: 335.8 ng/L (12-27 @ 04:53)  Troponin I, High Sensitivity Result: 93.3 ng/L (12-26 @ 18:02)                            9.8    18.53 )-----------( 138      ( 28 Dec 2021 07:26 )             30.7     12-28    149<H>  |  121<H>  |  52<H>  ----------------------------<  39<LL>  4.7   |  18<L>  |  2.01<H>    Ca    7.8<L>      28 Dec 2021 07:08  Phos  3.6     12-28  Mg     1.7     12-28    TPro  5.2<L>  /  Alb  1.7<L>  /  TBili  0.8  /  DBili  x   /  AST  29  /  ALT  33  /  AlkPhos  100  12-28      	      culCulture - Urine (12.27.21 @ 03:36)   Specimen Source: Clean Catch Clean Catch (Midstream)   Culture Results:   >100,000 CFU/ml Proteus vulgaris group

## 2021-12-29 NOTE — PROGRESS NOTE ADULT - SUBJECTIVE AND OBJECTIVE BOX
Problem List:  89 years old admitted with PATRICK and gram negative UTI, pyelonephritis.   CT abdomen , Left hydronephrosis with a calculi  UPJ area. Thinning of  left renal parenchyma  due to hydronephrosis with perinephritic stranding  Leukocytosis associated due to the above and on Cefepime...  Deterioration in renal function due to the above, left hydronephrosis and pyelonephritis.    Patient was  seen by urology suggested IR consult for nephrostomy tube.  Hypernatremia , possible cause using NS for hydration.     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 1000 milliLiter(s) (60 mL/Hr) IV Continuous <Continuous>  heparin   Injectable 5000 Unit(s) SubCutaneous every 8 hours  pantoprazole  Injectable 40 milliGRAM(s) IV Push at bedtime  sodium bicarbonate 650 milliGRAM(s) Oral three times a day    MEDICATIONS  (PRN):  acetaminophen     Tablet .. 650 milliGRAM(s) Oral every 6 hours PRN Mild Pain (1 - 3)                            9.8    18.53 )-----------( 138      ( 28 Dec 2021 07:26 )             30.7     12-28    149<H>  |  121<H>  |  52<H>  ----------------------------<  39<LL>  4.7   |  18<L>  |  2.01<H>    Ca    7.8<L>      28 Dec 2021 07:08  Phos  3.6     12-28  Mg     1.7     12-28    TPro  5.2<L>  /  Alb  1.7<L>  /  TBili  0.8  /  DBili  x   /  AST  29  /  ALT  33  /  AlkPhos  100  12-28        Osmolality, Random Urine: 432 mos/kg (12-27 @ 06:31)  Creatinine, Random Urine: 122 mg/dL (12-27 @ 06:31)  Sodium, Random Urine: 57 mmol/L (12-27 @ 06:31)      REVIEW OF SYSTEMS:  dementia      VITALS:  T(F): 97 (12-29-21 @ 07:30), Max: 98.1 (12-28-21 @ 23:30)  HR: 78 (12-29-21 @ 07:30)  BP: 128/71 (12-29-21 @ 07:30)  RR: 19 (12-29-21 @ 07:30)  SpO2: 98% (12-29-21 @ 07:30)  Wt(kg): --    12-28 @ 07:01  -  12-29 @ 07:00  --------------------------------------------------------  IN: 0 mL / OUT: 975 mL / NET: -975 mL        PHYSICAL EXAM:  Constitutional: well developed, no diaphoresis, no distress.  Neck: No JVD, no carotid bruit, supple, no adenopathy  Respiratory:  air entrance B/L, no wheezes, rales or rhonchi  Cardiovascular: S1, S2, RRR, no pericardial rub, no murmur  Abdomen: BS+, soft, no tenderness, no bruit  Pelvis: bladder nondistended  Extremities: No cyanosis or clubbing. No peripheral edema.     No response to verbal stimuli.

## 2021-12-29 NOTE — DIETITIAN INITIAL EVALUATION ADULT. - PROBLEM SELECTOR PLAN 1
- Patient with mild dementia at baseline presented with AMS  - Could be infectious vs advanced dementia   - UA is positive   - Will start her on rocephin IV for now   - Pending urine culture   - Will get dysphagia screen; If tolerated we can start on puree diet   - Fall precautions   - Aspiration precautions plan per MD

## 2021-12-30 DIAGNOSIS — F03.90 UNSPECIFIED DEMENTIA WITHOUT BEHAVIORAL DISTURBANCE: ICD-10-CM

## 2021-12-30 DIAGNOSIS — Z51.5 ENCOUNTER FOR PALLIATIVE CARE: ICD-10-CM

## 2021-12-30 DIAGNOSIS — R53.2 FUNCTIONAL QUADRIPLEGIA: ICD-10-CM

## 2021-12-30 DIAGNOSIS — E43 UNSPECIFIED SEVERE PROTEIN-CALORIE MALNUTRITION: ICD-10-CM

## 2021-12-30 DIAGNOSIS — Z02.9 ENCOUNTER FOR ADMINISTRATIVE EXAMINATIONS, UNSPECIFIED: ICD-10-CM

## 2021-12-30 LAB
ALBUMIN SERPL ELPH-MCNC: 1.8 G/DL — LOW (ref 3.5–5)
ALP SERPL-CCNC: 131 U/L — HIGH (ref 40–120)
ALT FLD-CCNC: 21 U/L DA — SIGNIFICANT CHANGE UP (ref 10–60)
ANION GAP SERPL CALC-SCNC: 6 MMOL/L — SIGNIFICANT CHANGE UP (ref 5–17)
AST SERPL-CCNC: 13 U/L — SIGNIFICANT CHANGE UP (ref 10–40)
BILIRUB SERPL-MCNC: 1 MG/DL — SIGNIFICANT CHANGE UP (ref 0.2–1.2)
BUN SERPL-MCNC: 29 MG/DL — HIGH (ref 7–18)
CALCIUM SERPL-MCNC: 8.1 MG/DL — LOW (ref 8.4–10.5)
CHLORIDE SERPL-SCNC: 120 MMOL/L — HIGH (ref 96–108)
CO2 SERPL-SCNC: 21 MMOL/L — LOW (ref 22–31)
CREAT SERPL-MCNC: 1.02 MG/DL — SIGNIFICANT CHANGE UP (ref 0.5–1.3)
GLUCOSE BLDC GLUCOMTR-MCNC: 78 MG/DL — SIGNIFICANT CHANGE UP (ref 70–99)
GLUCOSE BLDC GLUCOMTR-MCNC: 80 MG/DL — SIGNIFICANT CHANGE UP (ref 70–99)
GLUCOSE BLDC GLUCOMTR-MCNC: 86 MG/DL — SIGNIFICANT CHANGE UP (ref 70–99)
GLUCOSE BLDC GLUCOMTR-MCNC: 92 MG/DL — SIGNIFICANT CHANGE UP (ref 70–99)
GLUCOSE SERPL-MCNC: 83 MG/DL — SIGNIFICANT CHANGE UP (ref 70–99)
HCT VFR BLD CALC: 36.5 % — SIGNIFICANT CHANGE UP (ref 34.5–45)
HGB BLD-MCNC: 11.8 G/DL — SIGNIFICANT CHANGE UP (ref 11.5–15.5)
MAGNESIUM SERPL-MCNC: 2 MG/DL — SIGNIFICANT CHANGE UP (ref 1.6–2.6)
MCHC RBC-ENTMCNC: 31.4 PG — SIGNIFICANT CHANGE UP (ref 27–34)
MCHC RBC-ENTMCNC: 32.3 GM/DL — SIGNIFICANT CHANGE UP (ref 32–36)
MCV RBC AUTO: 97.1 FL — SIGNIFICANT CHANGE UP (ref 80–100)
NRBC # BLD: 0 /100 WBCS — SIGNIFICANT CHANGE UP (ref 0–0)
PHOSPHATE SERPL-MCNC: 1.9 MG/DL — LOW (ref 2.5–4.5)
PLATELET # BLD AUTO: 139 K/UL — LOW (ref 150–400)
POTASSIUM SERPL-MCNC: 3.3 MMOL/L — LOW (ref 3.5–5.3)
POTASSIUM SERPL-SCNC: 3.3 MMOL/L — LOW (ref 3.5–5.3)
PROT SERPL-MCNC: 5.7 G/DL — LOW (ref 6–8.3)
RBC # BLD: 3.76 M/UL — LOW (ref 3.8–5.2)
RBC # FLD: 16 % — HIGH (ref 10.3–14.5)
SODIUM SERPL-SCNC: 147 MMOL/L — HIGH (ref 135–145)
WBC # BLD: 15.81 K/UL — HIGH (ref 3.8–10.5)
WBC # FLD AUTO: 15.81 K/UL — HIGH (ref 3.8–10.5)

## 2021-12-30 RX ORDER — POTASSIUM PHOSPHATE, MONOBASIC POTASSIUM PHOSPHATE, DIBASIC 236; 224 MG/ML; MG/ML
30 INJECTION, SOLUTION INTRAVENOUS ONCE
Refills: 0 | Status: COMPLETED | OUTPATIENT
Start: 2021-12-30 | End: 2021-12-30

## 2021-12-30 RX ORDER — SODIUM CHLORIDE 9 MG/ML
1000 INJECTION, SOLUTION INTRAVENOUS
Refills: 0 | Status: DISCONTINUED | OUTPATIENT
Start: 2021-12-30 | End: 2021-12-30

## 2021-12-30 RX ORDER — DEXTROSE MONOHYDRATE, SODIUM CHLORIDE, AND POTASSIUM CHLORIDE 50; .745; 4.5 G/1000ML; G/1000ML; G/1000ML
1000 INJECTION, SOLUTION INTRAVENOUS
Refills: 0 | Status: DISCONTINUED | OUTPATIENT
Start: 2021-12-30 | End: 2021-12-31

## 2021-12-30 RX ADMIN — CEFEPIME 100 MILLIGRAM(S): 1 INJECTION, POWDER, FOR SOLUTION INTRAMUSCULAR; INTRAVENOUS at 06:20

## 2021-12-30 RX ADMIN — HEPARIN SODIUM 5000 UNIT(S): 5000 INJECTION INTRAVENOUS; SUBCUTANEOUS at 13:00

## 2021-12-30 RX ADMIN — HEPARIN SODIUM 5000 UNIT(S): 5000 INJECTION INTRAVENOUS; SUBCUTANEOUS at 06:20

## 2021-12-30 RX ADMIN — DEXTROSE MONOHYDRATE, SODIUM CHLORIDE, AND POTASSIUM CHLORIDE 60 MILLILITER(S): 50; .745; 4.5 INJECTION, SOLUTION INTRAVENOUS at 18:08

## 2021-12-30 RX ADMIN — POTASSIUM PHOSPHATE, MONOBASIC POTASSIUM PHOSPHATE, DIBASIC 83.33 MILLIMOLE(S): 236; 224 INJECTION, SOLUTION INTRAVENOUS at 10:06

## 2021-12-30 RX ADMIN — HEPARIN SODIUM 5000 UNIT(S): 5000 INJECTION INTRAVENOUS; SUBCUTANEOUS at 22:41

## 2021-12-30 RX ADMIN — PANTOPRAZOLE SODIUM 40 MILLIGRAM(S): 20 TABLET, DELAYED RELEASE ORAL at 22:41

## 2021-12-30 NOTE — CONSULT NOTE ADULT - SUBJECTIVE AND OBJECTIVE BOX
REASON FOR EVALUATION:  Discuss vasectomy.    HISTORY OF PRESENT ILLNESS:  This is a 35 year old   male with 3 children, presents on a self referral . He and his wife/partner have been thinking about contraception issues for some time  and are considering vasectomy as method of contraception. He is here to explore this option of contraception further.  Currently using oral contraceptives.  He reports normal erections and ejaculation. There is no history of easy bleeding or bruising.  No voiding complaints of frequency, urgency, slow stream or nocturia.    PAST MEDICAL HISTORY:  Past Medical History:   Diagnosis Date   • Negative History of CA, HTN, DM, CAD, CVA, DVT, Asthma        PAST SURGICAL HISTORY  Past Surgical History:   Procedure Laterality Date   • LUNG SURGERY  2000       SOCIAL HISTORY:    Tobacco Use: Never             Alcohol Use: Yes                Comment: weekends    Review of patient's social economics indicates:  No social economics status on file    ,  children 3     Employment: [employer] work    FAMILY HISTORY:  Family History   Problem Relation Age of Onset   • Hypertension Father    • Hypertension Mother    • Colon cancer Maternal Grandfather      diagnosed in 60s   • Colon cancer Maternal Aunt      diagnosed in 50s   • Lung Cancer Father      There is no family history of prostate cancer.    ALLERGIES:  Allergies as of 05/30/2017   • (No Known Allergies)       MEDICATIONS:  Current Outpatient Prescriptions   Medication Sig Dispense Refill   • HYDROcodone-acetaminophen (NORCO) 5-325 MG per tablet Take by mouth 1-2 tabs 1.5 hour prior to procedure. Then 1-2 tabs every 5-6 hours as needed for pain 15 tablet 0   • DIAZepam (VALIUM) 5 MG tablet Take 1-2 tablets 1 hour prior to procedure 2 tablet 0   • cephALEXin (KEFLEX) 500 MG capsule Take 1 capsule by mouth 3 times daily. 9 capsule 0   • cyclobenzaprine (FLEXERIL) 10 MG tablet Take 1 tablet by mouth 3 times daily as needed  for Muscle spasms. 30 tablet 0     No current facility-administered medications for this visit.          Nurse's notes reviewed and I concur.  ROS documented in nurse's notes are reviewed.      PHYSICAL EXAM:  GENERAL:  appears stated age, is in no apparent distress and is well developed and well nourished  VITALS:   Visit Vitals   • /74 (BP Location: [facility], Patient Position: Sitting, Cuff Size: Regular)   • Ht 5' 7\" (1.702 m)   • Wt 79.4 kg   • BMI 27.41 kg/m2     HEENT:  head is normocephalic, face is symmetric, bilateral eyes with no gross abnormality, bilateral ears with no gross abnormality, nose with no gross abnormality and mouth with pink mucosa  SKIN: Warm and dry with no lesions or rashes.  NECK:  neck is supple, no thyromegaly and no carotid bruits noted  LUNG:  lungs are clear to auscultation with normal inspiratory/expiratory sounds, no rales, no rhonchi and no wheezes  HEART:  normal rate and rhythm, S1 and S2 normal and no murmurs  ABDOMEN: abdomen is soft, nontender, without masses and without guarding  : testes descended bilaterally, no testicular masses, cord, vasa and epididymides normal, no hydrocele, hernia or epididymal cysts and phallus is circumcised, no urethral meatal stenosis and no penile plaques  RECTAL:no palpable internal or external hemorrhoids and normal sphincter control  PROSTATE: deferred  NEUROLOGIC:  The patient is oriented to person, place & time. Mood & affect is within normal limits. The patient is pleasant & cooperative.  EXTREMITIES:  supple & symmetrical with full ROM and no clubbing, cyanosis or edema    IMPRESSION:  [place] male requesting vasectomy.    PLAN:  The patient was counseled on the risks and benefits of vasectomy using brochures. The risks of bleeding, infection, spontaneous recanalization, sperm granuloma, sperm antibodies, chronic testicular pain, epididymitis,etc. were discussed. The procedure was discussed in detail, and the option of having the  [facility] under IV sedation was also discussed. The patient was counseled on the need for postoperative rest and limitation of activity. The need for a post-vasectomy semen analysis was emphasized. He was given a prescription for Valium, Keflex, and Hydrocodone, and appropriate dosing of these was discussed. The patient was scheduled for a vasectomy in the office at [place].    A copy of this consultation report was sent to Dr. Reed Pcp who is the requesting physician.       [facility] Geriatric and Palliative Consult Service:  Dr. Abiola Baez: cell ([phone])  Dr. Janny Davidson: cell ([phone])   Anya Rico NP: cell ([phone])  Amanda Hernandez NP: cell ([phone])   Jessee Bautistadonna LSW: cell ([phone])     HPI:  Patient is 89 years old female ( on wheel chair ) with past history of Right arthroplasty, chronic back pain, previous episodes of UTIs presented today to ED with AMS. Patient is very poor historian, so history was obtained by daughter. Patient was admitted before to [facility] for previous UTIs. Patient was seen by neurologist before for her dementia and she was told that is normal for age. Patient is taking overcounter meds for her back pain and heartburn. She mentioned that she was taking lasix for her fluid retention but stopped few months ago. The daughter wants her DNR/DNI.       Interval hx:  Pt seen and examined at the bedside, more awake, AOx2.  NAD.       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    Glaucoma    Urinary Frequency    Prolapse of Vaginal Wall    Back pain    Bacterial UTI    S/P Hip Replacement  2002    Bilateral Cataracts removed 2010    S/P Colonoscopy        SOCIAL HISTORY:    Admitted from:  home alone with family   Pt has 2 children, her daughter Daniella is her HCP    [employer]:   Gnosticism                                  Preferred Language: English     Daniella Metcalf  ([facility])     Phone# [phone]    FAMILY HISTORY:   unable to obtain from patient due to poor mentation  Baseline ADLs (prior to admission): dependent     Allergies    sulfa drugs (Hives)    Intolerances      Present Symptoms:    Unable to obtain due to poor mentation    MEDICATIONS  (STANDING):  cefepime   IVPB      cefepime   IVPB 500 milliGRAM(s) IV Intermittent every 24 hours  dextrose 5% + sodium chloride 0.45%. 1000 milliLiter(s) (60 mL/Hr) IV Continuous <Continuous>  heparin   Injectable 5000 Unit(s) SubCutaneous every 8 hours  pantoprazole  Injectable 40 milliGRAM(s) IV Push at bedtime  sodium bicarbonate 650 milliGRAM(s) Oral three times a day    MEDICATIONS  (PRN):  acetaminophen     Tablet .. 650 milliGRAM(s) Oral every 6 hours PRN Mild Pain (1 - 3)      PHYSICAL EXAM:  Vital Signs Last 24 Hrs  T(C): 36.6 (30 Dec 2021 16:07), Max: 36.7 (29 Dec 2021 23:49)  T(F): 97.9 (30 Dec 2021 16:07), Max: 98 (29 Dec 2021 23:49)  HR: 67 (30 Dec 2021 16:07) (54 - 97)  BP: 134/96 (30 Dec 2021 16:07) (118/64 - 158/91)  BP(mean): 83 (30 Dec 2021 05:23) (78 - 93)  RR: 18 (30 Dec 2021 16:07) (18 - 18)  SpO2: 98% (30 Dec 2021 16:07) (96% - 98%)    General: Frail elderly woman, AOx2.  NAD    Palliative Performance Scale/Karnofsky Score: 40%  ECOG Performance: n/a    HEENT: temporal wasting, poor dentition, neck supple  Lungs: unlabored on RA  CV: RRR, S1S2  GI: soft non distended non tender on palpation  incontinent  : incontinent   Musculoskeletal: bedbound, no edema  Skin: no abnormal skin lesions, poor skin turgor  Neuro: able to follow simple commands   Oral intake ability: unable/only mouth care    LABS:                        11.8   15.81 )-----------( 139      ( 30 Dec 2021 06:55 )             36.5     12-30    147<H>  |  120<H>  |  29<H>  ----------------------------<  83  3.3<L>   |  21<L>  |  1.02    Ca    8.1<L>      30 Dec 2021 06:55  Phos  1.9     12-30  Mg     2.0     12-30    TPro  5.7<L>  /  Alb  1.8<L>  /  TBili  1.0  /  DBili  x   /  AST  13  /  ALT  21  /  AlkPhos  131<H>  12-30    < from: CT Head No Cont (12.26.21 @ 20:03) >    ACC: [id number] EXAM:  CT BRAIN                          PROCEDURE DATE:  12/26/2021          INTERPRETATION:  CLINICAL INFORMATION: Status post fall.    TECHNIQUE:  Axial CT images were acquired through the head.  Intravenous contrast: None  Two-dimensional reformats were generated.    COMPARISON STUDY: None    FINDINGS:  There is no CT evidence of acute intracranial hemorrhage, mass effect,   midline shift or acute territorial infarct.    There is moderate generalized cerebral volume loss and mild   periventricular white matter hypoattenuation compatible chronic ischemic   disease.    The paranasal sinuses and mastoids are grossly clear.    The patient is status post cataract lens replacement surgery bilaterally.    The calvarium, skull base and scalp soft tissues appear unremarkable..    IMPRESSION:  No CT evidence of acute intracranial pathology.    < end of copied text >      RADIOLOGY & ADDITIONAL STUDIES: reviewed  ADVANCED DIRECTIVES: MOLST: DNR/DNI/no feeding tube/trial of IVF/comfort measures only/DNH.

## 2021-12-30 NOTE — CONSULT NOTE ADULT - PROBLEM SELECTOR RECOMMENDATION 9
Advanced dementia with superimposed delirium.  Pt is AOx2.  Confused.  Bedbound.  Total care.  FAST 7c minimum  Pt is appropriate for hospice.    Discussed dementia trajectory and provided anticipatory guidance  Educated pt's daughter Daniella about hospice philosophy and locations of care     Daniella agreed for pt to be discharged home with hospice.  SW referral made.      Pt is DNR/DNI Advanced dementia with superimposed delirium.  Pt is AOx2.  Confused.  Bedbound.  Total care.  FAST 7c minimum  Pt is appropriate for hospice.    Discussed dementia trajectory and provided anticipatory guidance  Educated pt's daughter Daniella about hospice philosophy and locations of care   Daniella agreed for pt to be discharged home with hospice.  SW referral made.        -Ativan 1 mg IVP every 4 hours prn for agitation     Pt is DNR/DNI

## 2021-12-30 NOTE — CONSULT NOTE ADULT - PROBLEM SELECTOR RECOMMENDATION 2
Clinical evidence indicates that the patient has Severe protein calorie malnutrition/ 3rd degree. Albumin 1.8    In context of Chronic Illness (>1 month)    Energy/Food intake <50% of estimated energy requirement >5 days  Weight loss: Moderate - severe   Body Fat loss: Severe   Cachexia, temporal wasting, muscle atrophy  Muscle mass loss: Severe     Strength: weakened severe bedbound    Recommend:   pleasure feeds as tolerated - aspiration precautions, careful hand-feeding, teaching to caregivers  nutritional supplements as tolerated, nutrition consult      SLP nestor noted  Currently NPO  No feeding tube

## 2021-12-30 NOTE — CONSULT NOTE ADULT - CONSULT REASON
renal failure
renal stone with hydronephrosis
Discuss complex medical decision making related to goals of care
UTI
Abnormal EKG,Positive troponins

## 2021-12-30 NOTE — SWALLOW BEDSIDE ASSESSMENT ADULT - PHARYNGEAL PHASE
Delayed pharyngeal swallow/Decreased laryngeal elevation Delayed pharyngeal swallow/Decreased laryngeal elevation/Throat clear post oral intake Delayed pharyngeal swallow/Decreased laryngeal elevation/Delayed cough post oral intake Decreased laryngeal elevation/Throat clear post oral intake/Delayed cough post oral intake

## 2021-12-30 NOTE — SWALLOW BEDSIDE ASSESSMENT ADULT - SLP PERTINENT HISTORY OF CURRENT PROBLEM
Patient is 89 year old female ( on wheel chair ) with past history of Right arthroplasty, chronic back pain, previous episodes of UTIs presented today to ED with AMS. Patient was found to have positive UA. mildy elevated troponin probably 2/2 poor oral intake. Patient will be admitted for further care.

## 2021-12-30 NOTE — SWALLOW BEDSIDE ASSESSMENT ADULT - COMMENTS
Pt AA+Ox1, partially responsive to simple queries and commands, HOB elevated to 75°, listing to the left.

## 2021-12-30 NOTE — CONSULT NOTE ADULT - CONVERSATION DETAILS
Spoke with the pt's daughter Daniella Metcalf via phone ([phone]) discussed her current clinical condition and overall poor prognosis.  She reports she has been her mother's primary care giver, she does not have an HHA.  Pt is bedbound, incontinent of urine and stool.  Complete care.  Explain hospice philosophy and locations of care. She stated her father was on hospice and they were supportive.  She wants pt to be discharged home with hospice.    Reviewed MAVIS DNR/DNI.  Discussed the risks and benefits of artificial nutrition/PEG.  New MAVIS drafted; DNR/DNI/no feeding tube/trial of IVF/comfort measures only/DNH.  LATONIA referral made for home hospice.  All questions answered.  Support provided.      Plan discussed with Primary team.

## 2021-12-30 NOTE — SWALLOW BEDSIDE ASSESSMENT ADULT - SWALLOW EVAL: DIAGNOSIS
Pt seen for bedside swallow evaluation; poor oral hygiene. Pt p/w oropharyngeal dysphagia c/b impaired bolus formation, increased oral transit time, impaired A-P transport, suspected delayed initiation of pharyngeal swallow, reduced laryngeal elevation upon palpation, audible/gulp swallow, throat clearing post swallow, delayed cough. Risk of aspiration.

## 2021-12-30 NOTE — CONSULT NOTE ADULT - PROBLEM SELECTOR RECOMMENDATION 3
2/2 advanced dementia. Bedbound.  Total care.  High risk for skin failure.  Supportive care  Frequent positioning      Comfort measures

## 2021-12-30 NOTE — PROGRESS NOTE ADULT - SUBJECTIVE AND OBJECTIVE BOX
CHIEF COMPLAINT:Patient is a 89y old  Female who presents with a chief complaint of Altered mental status.Pt appears comfortable,more awake and alert..    	  REVIEW OF SYSTEMS:  CONSTITUTIONAL: No fever, weight loss, or fatigue  EYES: No eye pain, visual disturbances, or discharge  ENT:  No difficulty hearing, tinnitus, vertigo; No sinus or throat pain  NECK: No pain or stiffness  RESPIRATORY: No cough, wheezing, chills or hemoptysis; No Shortness of Breath  CARDIOVASCULAR: No chest pain, palpitations, passing out, dizziness, or leg swelling  GASTROINTESTINAL: No abdominal or epigastric pain. No nausea, vomiting, or hematemesis; No diarrhea or constipation. No melena or hematochezia.  GENITOURINARY: No dysuria, frequency, hematuria, or incontinence  NEUROLOGICAL: No headaches, memory loss, loss of strength, numbness, or tremors  SKIN: No itching, burning, rashes, or lesions   LYMPH Nodes: No enlarged glands  ENDOCRINE: No heat or cold intolerance; No hair loss  MUSCULOSKELETAL: No joint pain or swelling; No muscle, back, or extremity pain  PSYCHIATRIC: No depression, anxiety, mood swings, or difficulty sleeping  HEME/LYMPH: No easy bruising, or bleeding gums  ALLERGY AND IMMUNOLOGIC: No hives or eczema	      PHYSICAL EXAM:  T(C): 36.6 (12-30-21 @ 07:40), Max: 36.7 (12-29-21 @ 23:49)  HR: 61 (12-30-21 @ 07:40) (54 - 74)  BP: 144/87 (12-30-21 @ 07:40) (118/64 - 162/75)  RR: 18 (12-30-21 @ 07:40) (18 - 19)  SpO2: 96% (12-30-21 @ 07:40) (95% - 98%)  Wt(kg): --  I&O's Summary    29 Dec 2021 07:01  -  30 Dec 2021 07:00  --------------------------------------------------------  IN: 0 mL / OUT: 500 mL / NET: -500 mL        Appearance: Normal	  HEENT:   Normal oral mucosa, PERRL, EOMI	  Lymphatic: No lymphadenopathy  Cardiovascular: Normal S1 S2, No JVD, No murmurs, No edema  Respiratory: Lungs clear to auscultation	  Psychiatry: A & O x 3, Mood & affect appropriate  Gastrointestinal:  Soft, Non-tender, + BS	  Skin: No rashes, No ecchymoses, No cyanosis	  Neurologic: Non-focal  Extremities: Normal range of motion, No clubbing, cyanosis or edema  Vascular: Peripheral pulses palpable 2+ bilaterally    MEDICATIONS  (STANDING):  cefepime   IVPB      cefepime   IVPB 500 milliGRAM(s) IV Intermittent every 24 hours  dextrose 5% + sodium chloride 0.45%. 1000 milliLiter(s) (60 mL/Hr) IV Continuous <Continuous>  heparin   Injectable 5000 Unit(s) SubCutaneous every 8 hours  pantoprazole  Injectable 40 milliGRAM(s) IV Push at bedtime  sodium bicarbonate 650 milliGRAM(s) Oral three times a day      LABS:	 	  Troponin I, High Sensitivity Result: 568.2 ng/L (12-28 @ 07:08)  Troponin I, High Sensitivity Result: 589.2 ng/L (12-28 @ 03:29)  Troponin I, High Sensitivity Result: 395.6 ng/L (12-27 @ 14:42)                            11.8   15.81 )-----------( 139      ( 30 Dec 2021 06:55 )             36.5     12-30    147<H>  |  120<H>  |  29<H>  ----------------------------<  83  3.3<L>   |  21<L>  |  1.02    Ca    8.1<L>      30 Dec 2021 06:55  Phos  1.9     12-30  Mg     2.0     12-30    TPro  5.7<L>  /  Alb  1.8<L>  /  TBili  1.0  /  DBili  x   /  AST  13  /  ALT  21  /  AlkPhos  131<H>  12-30

## 2021-12-30 NOTE — PROGRESS NOTE ADULT - PROBLEM SELECTOR PLAN 7
As per palliative care team note pt will be going on home hospice  SW referral made for home hospice.

## 2021-12-30 NOTE — PROGRESS NOTE ADULT - SUBJECTIVE AND OBJECTIVE BOX
Problem List:  PATRICK  UTI  89 years old admitted with PATRICK and gram negative UTI, pyelonephritis.   CT abdomen , Left hydronephrosis with a calculi  UPJ area. Thinning of  left renal parenchyma  due to hydronephrosis with perinephritic stranding  Leukocytosis associated due to the above and on Cefepime...  Deterioration in renal function due to the above, left hydronephrosis and pyelonephritis.    Patient was  seen by urology suggested IR consult for nephrostomy tube.  Hypernatremia , possible cause using NS for hydration.       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 1000 milliLiter(s) (60 mL/Hr) IV Continuous <Continuous>  heparin   Injectable 5000 Unit(s) SubCutaneous every 8 hours  pantoprazole  Injectable 40 milliGRAM(s) IV Push at bedtime  sodium bicarbonate 650 milliGRAM(s) Oral three times a day    MEDICATIONS  (PRN):  acetaminophen     Tablet .. 650 milliGRAM(s) Oral every 6 hours PRN Mild Pain (1 - 3)                            11.8   15.81 )-----------( 139      ( 30 Dec 2021 06:55 )             36.5     12-30    147<H>  |  120<H>  |  29<H>  ----------------------------<  83  3.3<L>   |  21<L>  |  1.02    Ca    8.1<L>      30 Dec 2021 06:55  Phos  1.9     12-30  Mg     2.0     12-30    TPro  5.7<L>  /  Alb  1.8<L>  /  TBili  1.0  /  DBili  x   /  AST  13  /  ALT  21  /  AlkPhos  131<H>  12-30    < from: CT Abdomen and Pelvis No Cont (12.26.21 @ 20:03) >  KIDNEYS/URETERS: Bilateral renal cysts. 2.9 cm stone and 1.3 cm stone at   the level of the left renal pelvis, possibly extending into the UPJ,   difficult to evaluate. At least mild left hydronephrosis. Difficult to   evaluate lower pole hydronephrosis due to possible parapelvic cyst in   this region. Left perinephric stranding.    < end of copied text >      Osmolality, Random Urine: 432 mos/kg (12-27 @ 06:31)  Creatinine, Random Urine: 122 mg/dL (12-27 @ 06:31)  Sodium, Random Urine: 57 mmol/L (12-27 @ 06:31)      REVIEW OF SYSTEMS:  General: no fever no chills, no weight loss.  EYES/ENT: No visual changes;  No vertigo, no headache.  NECK: No pain or stiffness  RESPIRATORY: No cough, wheezing, hemoptysis; No shortness of breath  CARDIOVASCULAR: No chest pain or palpitations. No Edema  GASTROINTESTINAL: No abdominal or epigastric pain. No nausea, vomiting. No diarrhea or constipation. No melena.  GENITOURINARY: No dysuria, frequency, foamy urine, urinary urgency, incontinence or hematuria  NEUROLOGICAL: No numbness or weakness, no tremor , no dizziness.   Muscle skeletal : no joint pain and no swelling of joints and limbs.  SKIN: No itching, burning, rashes.        VITALS:  T(F): 97.9 (12-30-21 @ 16:07), Max: 98 (12-29-21 @ 23:49)  HR: 67 (12-30-21 @ 16:07)  BP: 134/96 (12-30-21 @ 16:07)  RR: 18 (12-30-21 @ 16:07)  SpO2: 98% (12-30-21 @ 16:07)  Wt(kg): --    12-29 @ 07:01  -  12-30 @ 07:00  --------------------------------------------------------  IN: 0 mL / OUT: 500 mL / NET: -500 mL        PHYSICAL EXAM:  Constitutional: well developed, no diaphoresis, no distress.  Neck: No JVD, no carotid bruit, supple, no adenopathy  Respiratory:  air entrance B/L, no wheezes, rales or rhonchi  Cardiovascular: S1, S2, RRR, no pericardial rub, no murmur  Abdomen: BS+, soft, no tenderness, no bruit  Pelvis: bladder nondistended  Extremities: No cyanosis or clubbing. No peripheral edema.     Confused

## 2021-12-30 NOTE — PROGRESS NOTE ADULT - SUBJECTIVE AND OBJECTIVE BOX
Subjective    Patient seen and examined. No events overnight. Afebrile, improving WBC and Cr.     Objective    Vital signs  T(F): , Max: 98 (12-29-21 @ 23:49)  HR: 61 (12-30-21 @ 07:40)  BP: 144/87 (12-30-21 @ 07:40)  SpO2: 96% (12-30-21 @ 07:40)  Wt(kg): --    Output     12-29 @ 07:01  -  12-30 @ 07:00  --------------------------------------------------------  IN: 0 mL / OUT: 500 mL / NET: -500 mL        General: awake, confused, NAD  Chest: respiration unlabored  CVAT unable to be assessed as patient refusing further examination  : michaud with clear urine    Labs      12-30 @ 06:55    WBC 15.81 / Hct 36.5  / SCr 1.02       Culture - Blood (12.27.21 @ 12:43)    Specimen Source: .Blood Blood-Peripheral    Culture Results:   No growth to date.    Culture - Blood (12.27.21 @ 12:43)    Specimen Source: .Blood Blood-Peripheral    Culture Results:   No growth to date.    Culture - Urine (12.27.21 @ 03:36)    -  Trimethoprim/Sulfamethoxazole: S <=0.5/9.5    -  Tigecycline: R <=2    -  Tobramycin: S 4    -  Ceftriaxone: S <=1 Enterobacter, Klebsiella aerogenes, Citrobacter, and Serratia may develop resistance during prolonged therapy    -  Ciprofloxacin: S <=0.25    -  Ertapenem: S <=0.5    -  Gentamicin: S <=2    -  Levofloxacin: S <=0.5    -  Meropenem: S <=1    -  Nitrofurantoin: R >64 Should not be used to treat pyelonephritis    -  Piperacillin/Tazobactam: S <=8    -  Amikacin: S <=16    -  Amoxicillin/Clavulanic Acid: S <=8/4    -  Ampicillin: R >16 These ampicillin results predict results for amoxicillin    -  Ampicillin/Sulbactam: S <=4/2 Enterobacter, Klebsiella aerogenes, Citrobacter, and Serratia may develop resistance during prolonged therapy (3-4 days)    -  Aztreonam: S <=4    -  Cefazolin: R >16    -  Cefepime: S <=2    -  Cefoxitin: S <=8    Specimen Source: Clean Catch Clean Catch (Midstream)    Culture Results:   >100,000 CFU/ml Proteus vulgaris group    Organism Identification: Proteus vulgaris group    Organism: Proteus vulgaris group    Method Type: MARILIA        Imaging: no new imaging for review

## 2021-12-30 NOTE — PROGRESS NOTE ADULT - PROBLEM SELECTOR PLAN 1
- Patient with mild dementia at baseline presented with AMS. As per conversation with daughter today pt MS waxes and wanes  - UA is positive   - previously on Rocephin, now on cefepime  - urine culture growing proteous (S) to cefepime  - BCX NGTD (initial)  - Repeat BCX neg  - Fall precautions   - Aspiration precautions  - palliative Dr. Davidson consulted-New MOLST drafted; DNR/DNI/no feeding tube/trial of IVF/comfort measures only/DNH. Pt to go home on home hospice.

## 2021-12-30 NOTE — PROGRESS NOTE ADULT - SUBJECTIVE AND OBJECTIVE BOX
PGY-1 Progress Note discussed with attending    PAGER #: [[phone]] TILL 5:00 PM  PLEASE CONTACT ON CALL TEAM:  - On Call Team (Please refer to [facility]) FROM 5:00 PM - 8:30PM  - Nightfloat Team FROM 8:30 -7:30 AM    CHIEF COMPLAINT & BRIEF HOSPITAL COURSE:    INTERVAL HPI/OVERNIGHT EVENTS:       REVIEW OF SYSTEMS:  CONSTITUTIONAL: No fever, weight loss, or fatigue  RESPIRATORY: No cough, wheezing, chills or hemoptysis; No shortness of breath  CARDIOVASCULAR: No chest pain, palpitations, dizziness, or leg swelling  GASTROINTESTINAL: No abdominal pain. No nausea, vomiting, or hematemesis; No diarrhea or constipation. No melena or hematochezia.  GENITOURINARY: No dysuria or hematuria, urinary frequency  NEUROLOGICAL: No headaches, memory loss, loss of strength, numbness, or tremors  SKIN: No itching, burning, rashes, or lesions     Vital Signs Last 24 Hrs  T(C): 35 (30 Dec 2021 10:56), Max: 36.7 (29 Dec 2021 23:49)  T(F): 95 (30 Dec 2021 10:56), Max: 98 (29 Dec 2021 23:49)  HR: 97 (30 Dec 2021 10:56) (54 - 97)  BP: 158/91 (30 Dec 2021 10:56) (118/64 - 162/75)  BP(mean): 83 (30 Dec 2021 05:23) (78 - 93)  RR: 18 (30 Dec 2021 10:56) (18 - 19)  SpO2: 98% (30 Dec 2021 10:56) (95% - 98%)    PHYSICAL EXAMINATION:  GENERAL: NAD, well built  HEAD:  Atraumatic, Normocephalic  EYES:  conjunctiva and sclera clear  NECK: Supple, No JVD, Normal thyroid  CHEST/LUNG: Clear to auscultation. Clear to percussion bilaterally; No rales, rhonchi, wheezing, or rubs  HEART: Regular rate and rhythm; No murmurs, rubs, or gallops  ABDOMEN: Soft, Nontender, Nondistended; Bowel sounds present  NERVOUS SYSTEM:  Alert & Oriented X3,    EXTREMITIES:  2+ Peripheral Pulses, No clubbing, cyanosis, or edema  SKIN: warm dry                          11.8   15.81 )-----------( 139      ( 30 Dec 2021 06:55 )             36.5     12-30    147<H>  |  120<H>  |  29<H>  ----------------------------<  83  3.3<L>   |  21<L>  |  1.02    Ca    8.1<L>      30 Dec 2021 06:55  Phos  1.9     12-30  Mg     2.0     12-30    TPro  5.7<L>  /  Alb  1.8<L>  /  TBili  1.0  /  DBili  x   /  AST  13  /  ALT  21  /  AlkPhos  131<H>  12-30    LIVER FUNCTIONS - ( 30 Dec 2021 06:55 )  Alb: 1.8 g/dL / Pro: 5.7 g/dL / ALK PHOS: 131 U/L / ALT: 21 U/L DA / AST: 13 U/L / GGT: x                   CAPILLARY BLOOD GLUCOSE      RADIOLOGY & ADDITIONAL TESTS:                   PGY-1 Progress Note discussed with attending    PAGER #: [[phone]] TILL 5:00 PM  PLEASE CONTACT ON CALL TEAM:  - On Call Team (Please refer to [facility]) FROM 5:00 PM - 8:30PM  - Nightfloat Team FROM 8:30 -7:30 AM      INTERVAL HPI/OVERNIGHT EVENTS:  No overnight events. A little bit more alert today. However, still not answering questions. Cannot assess for ROS.     Vital Signs Last 24 Hrs  T(C): 35 (30 Dec 2021 10:56), Max: 36.7 (29 Dec 2021 23:49)  T(F): 95 (30 Dec 2021 10:56), Max: 98 (29 Dec 2021 23:49)  HR: 97 (30 Dec 2021 10:56) (54 - 97)  BP: 158/91 (30 Dec 2021 10:56) (118/64 - 162/75)  BP(mean): 83 (30 Dec 2021 05:23) (78 - 93)  RR: 18 (30 Dec 2021 10:56) (18 - 19)  SpO2: 98% (30 Dec 2021 10:56) (95% - 98%)    PHYSICAL EXAMINATION:  GENERAL: NAD, thin  HEAD:  Atraumatic, Normocephalic  EYES:  conjunctiva and sclera clear  NECK: Supple, No JVD  CHEST/LUNG: Clear to auscultation. Clear to percussion bilaterally; No rales, rhonchi, wheezing, or rubs  HEART: Regular rate and rhythm; No murmurs, rubs, or gallops  ABDOMEN: Soft, Nontender, Nondistended; Bowel sounds present  NERVOUS SYSTEM:  Alert & Oriented X0,    EXTREMITIES:  2+ Peripheral Pulses, No clubbing, cyanosis, or edema  SKIN: warm dry                            11.8   15.81 )-----------( 139      ( 30 Dec 2021 06:55 )             36.5     12-30    147<H>  |  120<H>  |  29<H>  ----------------------------<  83  3.3<L>   |  21<L>  |  1.02    Ca    8.1<L>      30 Dec 2021 06:55  Phos  1.9     12-30  Mg     2.0     12-30    TPro  5.7<L>  /  Alb  1.8<L>  /  TBili  1.0  /  DBili  x   /  AST  13  /  ALT  21  /  AlkPhos  131<H>  12-30    LIVER FUNCTIONS - ( 30 Dec 2021 06:55 )  Alb: 1.8 g/dL / Pro: 5.7 g/dL / ALK PHOS: 131 U/L / ALT: 21 U/L DA / AST: 13 U/L / GGT: x                   CAPILLARY BLOOD GLUCOSE      RADIOLOGY & ADDITIONAL TESTS:

## 2021-12-31 LAB
ALBUMIN SERPL ELPH-MCNC: 2 G/DL — LOW (ref 3.5–5)
ALP SERPL-CCNC: 117 U/L — SIGNIFICANT CHANGE UP (ref 40–120)
ALT FLD-CCNC: 19 U/L DA — SIGNIFICANT CHANGE UP (ref 10–60)
ANION GAP SERPL CALC-SCNC: 7 MMOL/L — SIGNIFICANT CHANGE UP (ref 5–17)
AST SERPL-CCNC: 17 U/L — SIGNIFICANT CHANGE UP (ref 10–40)
BILIRUB SERPL-MCNC: 0.9 MG/DL — SIGNIFICANT CHANGE UP (ref 0.2–1.2)
BUN SERPL-MCNC: 17 MG/DL — SIGNIFICANT CHANGE UP (ref 7–18)
CALCIUM SERPL-MCNC: 8.5 MG/DL — SIGNIFICANT CHANGE UP (ref 8.4–10.5)
CHLORIDE SERPL-SCNC: 117 MMOL/L — HIGH (ref 96–108)
CO2 SERPL-SCNC: 21 MMOL/L — LOW (ref 22–31)
CREAT SERPL-MCNC: 0.74 MG/DL — SIGNIFICANT CHANGE UP (ref 0.5–1.3)
GLUCOSE BLDC GLUCOMTR-MCNC: 60 MG/DL — LOW (ref 70–99)
GLUCOSE BLDC GLUCOMTR-MCNC: 83 MG/DL — SIGNIFICANT CHANGE UP (ref 70–99)
GLUCOSE BLDC GLUCOMTR-MCNC: 89 MG/DL — SIGNIFICANT CHANGE UP (ref 70–99)
GLUCOSE BLDC GLUCOMTR-MCNC: 90 MG/DL — SIGNIFICANT CHANGE UP (ref 70–99)
GLUCOSE BLDC GLUCOMTR-MCNC: 94 MG/DL — SIGNIFICANT CHANGE UP (ref 70–99)
GLUCOSE SERPL-MCNC: 81 MG/DL — SIGNIFICANT CHANGE UP (ref 70–99)
HCT VFR BLD CALC: 38.4 % — SIGNIFICANT CHANGE UP (ref 34.5–45)
HGB BLD-MCNC: 12.2 G/DL — SIGNIFICANT CHANGE UP (ref 11.5–15.5)
MAGNESIUM SERPL-MCNC: 1.9 MG/DL — SIGNIFICANT CHANGE UP (ref 1.6–2.6)
MCHC RBC-ENTMCNC: 30.7 PG — SIGNIFICANT CHANGE UP (ref 27–34)
MCHC RBC-ENTMCNC: 31.8 GM/DL — LOW (ref 32–36)
MCV RBC AUTO: 96.7 FL — SIGNIFICANT CHANGE UP (ref 80–100)
NRBC # BLD: 0 /100 WBCS — SIGNIFICANT CHANGE UP (ref 0–0)
PHOSPHATE SERPL-MCNC: 2.6 MG/DL — SIGNIFICANT CHANGE UP (ref 2.5–4.5)
PLATELET # BLD AUTO: 94 K/UL — LOW (ref 150–400)
POTASSIUM SERPL-MCNC: 4.1 MMOL/L — SIGNIFICANT CHANGE UP (ref 3.5–5.3)
POTASSIUM SERPL-SCNC: 4.1 MMOL/L — SIGNIFICANT CHANGE UP (ref 3.5–5.3)
PROT SERPL-MCNC: 5.6 G/DL — LOW (ref 6–8.3)
RBC # BLD: 3.97 M/UL — SIGNIFICANT CHANGE UP (ref 3.8–5.2)
RBC # FLD: 15.5 % — HIGH (ref 10.3–14.5)
SODIUM SERPL-SCNC: 145 MMOL/L — SIGNIFICANT CHANGE UP (ref 135–145)
WBC # BLD: 7.29 K/UL — SIGNIFICANT CHANGE UP (ref 3.8–10.5)
WBC # FLD AUTO: 7.29 K/UL — SIGNIFICANT CHANGE UP (ref 3.8–10.5)

## 2021-12-31 RX ORDER — DEXTROSE 50 % IN WATER 50 %
25 SYRINGE (ML) INTRAVENOUS ONCE
Refills: 0 | Status: COMPLETED | OUTPATIENT
Start: 2021-12-31 | End: 2021-12-31

## 2021-12-31 RX ORDER — SODIUM CHLORIDE 9 MG/ML
1000 INJECTION, SOLUTION INTRAVENOUS
Refills: 0 | Status: DISCONTINUED | OUTPATIENT
Start: 2021-12-31 | End: 2022-01-01

## 2021-12-31 RX ORDER — METOPROLOL TARTRATE 50 MG
2.5 TABLET ORAL EVERY 6 HOURS
Refills: 0 | Status: DISCONTINUED | OUTPATIENT
Start: 2021-12-31 | End: 2022-01-03

## 2021-12-31 RX ADMIN — Medication 2.5 MILLIGRAM(S): at 23:53

## 2021-12-31 RX ADMIN — Medication 25 GRAM(S): at 11:32

## 2021-12-31 RX ADMIN — HEPARIN SODIUM 5000 UNIT(S): 5000 INJECTION INTRAVENOUS; SUBCUTANEOUS at 13:52

## 2021-12-31 RX ADMIN — Medication 2.5 MILLIGRAM(S): at 17:06

## 2021-12-31 RX ADMIN — Medication 2.5 MILLIGRAM(S): at 11:33

## 2021-12-31 RX ADMIN — Medication 650 MILLIGRAM(S): at 22:25

## 2021-12-31 RX ADMIN — DEXTROSE MONOHYDRATE, SODIUM CHLORIDE, AND POTASSIUM CHLORIDE 60 MILLILITER(S): 50; .745; 4.5 INJECTION, SOLUTION INTRAVENOUS at 11:33

## 2021-12-31 RX ADMIN — HEPARIN SODIUM 5000 UNIT(S): 5000 INJECTION INTRAVENOUS; SUBCUTANEOUS at 05:03

## 2021-12-31 RX ADMIN — SODIUM CHLORIDE 60 MILLILITER(S): 9 INJECTION, SOLUTION INTRAVENOUS at 18:43

## 2021-12-31 RX ADMIN — HEPARIN SODIUM 5000 UNIT(S): 5000 INJECTION INTRAVENOUS; SUBCUTANEOUS at 22:23

## 2021-12-31 RX ADMIN — PANTOPRAZOLE SODIUM 40 MILLIGRAM(S): 20 TABLET, DELAYED RELEASE ORAL at 23:04

## 2021-12-31 RX ADMIN — CEFEPIME 100 MILLIGRAM(S): 1 INJECTION, POWDER, FOR SOLUTION INTRAMUSCULAR; INTRAVENOUS at 05:03

## 2021-12-31 NOTE — PROGRESS NOTE ADULT - PROBLEM SELECTOR PLAN 7
As per palliative care team note pt will be going on home hospice  SW referral made for home hospice.  On DC will need to go on nitrofurantoin for UTI prophylaxis  given pt has renal stones.

## 2021-12-31 NOTE — PROGRESS NOTE ADULT - PROBLEM SELECTOR PLAN 1
- Patient with mild dementia at baseline presented with AMS. As per conversation with daughter today pt MS waxes and wanes  - UA is positive   - previously on Rocephin, now on cefepime  - urine culture growing proteous (S) to cefepime  - BCX NGTD (initial)  - Repeat BCX neg  -c/w Cefepime (D5)  - Fall precautions   - Aspiration precautions  - palliative Dr. Davidson consulted-New MOLST drafted; DNR/DNI/no feeding tube/trial of IVF/comfort measures only/DNH. Pt to go home on home hospice.

## 2021-12-31 NOTE — PROGRESS NOTE ADULT - SUBJECTIVE AND OBJECTIVE BOX
Patient is seen and examined at the bed side, is afebrile.  The Leukocytosis trended down to normal. The MRSA PCR is negative.      REVIEW OF SYSTEMS: Unable to obtain due to mental status      ALLERGIES: sulfa drugs (Hives)      Vital Signs Last 24 Hrs  T(C): 36.3 (31 Dec 2021 19:16), Max: 37.1 (31 Dec 2021 11:50)  T(F): 97.3 (31 Dec 2021 19:16), Max: 98.7 (31 Dec 2021 11:50)  HR: 64 (31 Dec 2021 19:16) (60 - 84)  BP: 182/95 (31 Dec 2021 19:16) (151/69 - 182/95)  BP(mean): --  RR: 18 (31 Dec 2021 19:16) (17 - 19)  SpO2: 97% (31 Dec 2021 19:16) (97% - 100%)      PHYSICAL EXAM:  GENERAL: Not in distress   CHEST/LUNG:  Not using accessory muscles  HEART: s1 and s2 present  ABDOMEN:  Nontender and  Nondistended  EXTREMITIES: No pedal  edema  CNS: Confused , and screaming intermittently       LABS:                          12.2   7.29  )-----------( 94       ( 31 Dec 2021 07:32 )             38.4                         9.8    18.53 )-----------( 138      ( 28 Dec 2021 07:26 )             30.7                10.8   18.34 )-----------( 160      ( 27 Dec 2021 14:42 )             33.8           145  |  117<H>  |  17  ----------------------------<  81  4.1   |  21<L>  |  0.74    Ca    8.5      31 Dec 2021 07:32  Phos  2.6       Mg     1.9         TPro  5.6<L>  /  Alb  2.0<L>  /  TBili  0.9  /  DBili  x   /  AST  17  /  ALT  19  /  AlkPhos  117      12    149<H>  |  121<H>  |  52<H>  ----------------------------<  39<LL>  4.7   |  18<L>  |  2.01<H>    Ca    7.8<L>      28 Dec 2021 07:08  Phos  3.6       Mg     1.7         TPro  5.2<L>  /  Alb  1.7<L>  /  TBili  0.8  /  DBili  x   /  AST  29  /  ALT  33  /  AlkPhos  100      PT/INR - ( 27 Dec 2021 04:52 )   PT: 12.5 sec;   INR: 1.05 ratio    PTT - ( 27 Dec 2021 04:52 )  PTT:29.6 sec        CAPILLARY BLOOD GLUCOSE  POCT Blood Glucose.: 80 mg/dL (27 Dec 2021 04:17)        Urinalysis Basic - ( 26 Dec 2021 20:36 )  Color: Red / Appearance: bloody / S.010 / pH: x  Gluc: x / Ketone: Negative  / Bili: Negative / Urobili: Negative   Blood: x / Protein: 100 / Nitrite: Positive   Leuk Esterase: Moderate / RBC: >50 /HPF / WBC >50 /HPF   Sq Epi: x / Non Sq Epi: Few /HPF / Bacteria: Moderate /HPF        MEDICATIONS  (STANDING    cefepime   IVPB      cefepime   IVPB 500 milliGRAM(s) IV Intermittent every 24 hours  dextrose 5% + sodium chloride 0.45%. 1000 milliLiter(s) (60 mL/Hr) IV Continuous <Continuous>  heparin   Injectable 5000 Unit(s) SubCutaneous every 8 hours  metoprolol tartrate Injectable 2.5 milliGRAM(s) IV Push every 6 hours  pantoprazole  Injectable 40 milliGRAM(s) IV Push at bedtime  sodium bicarbonate 650 milliGRAM(s) Oral three times a day      RADIOLOGY & ADDITIONAL TESTS:    21: CT Abdomen and Pelvis No Cont (21 @ 20:03) Significantly limited exam due to motion artifact and noncontrast technique.    2.9 cm stone and 1.3 cm stone at the level of the left renal pelvis  possibly extending into the UPJ, difficult to evaluate. At least mild   left upper pole hydronephrosis and indeterminate lower pole hydronephrosis secondary to possible parapelvic cyst in this location.   Mild left perinephric stranding. Recommend correlation with urinalysis.    21: Xray Chest 1 View-PORTABLE IMMEDIATE (Xray Chest 1 View-PORTABLE IMMEDIATE .) (21 @ 20:52) Right perihilar opacity, differential diagnosis includes focal  consolidation and aortic aneurysm      MICROBIOLOGY DATA:    Culture - Blood (21 @ 13:34)   Specimen Source: .Blood Blood   Culture Results: No growth to date.     Culture - Blood (21 @ 13:34)   Specimen Source: .Blood Blood   Culture Results: No growth to date.     MRSA/MSSA PCR (21 @ 10:45)   MRSA PCR Result.: NotDetec:    Culture - Urine (21 @ 03:36)   - Trimethoprim/Sulfamethoxazole: S <=0.5/9.5   - Cefepime: S <=2   - Cefoxitin: S <=8   - Ceftriaxone: S <=1 Enterobacter, Klebsiella aerogenes, Citrobacter, and Serratia may develop resistance during prolonged therapy   - Ciprofloxacin: S <=0.25   - Ertapenem: S <=0.5   - Gentamicin: S <=2   - Levofloxacin: S <=0.5   - Meropenem: S <=1   - Nitrofurantoin: R >64 Should not be used to treat pyelonephritis   - Piperacillin/Tazobactam: S <=8   - Tigecycline: R <=2   - Tobramycin: S 4   - Amikacin: S <=16   - Amoxicillin/Clavulanic Acid: S <=8/4   - Ampicillin: R >16 These ampicillin results predict results for amoxicillin   - Ampicillin/Sulbactam: S <=4/2 Enterobacter, Klebsiella aerogenes, Citrobacter, and Serratia may develop resistance during prolonged therapy (3-4 days)   - Aztreonam: S <=4   - Cefazolin: R >16   Specimen Source: Clean Catch Clean Catch (Midstream)   Culture Results:   >100,000 CFU/ml Proteus vulgaris group   Organism Identification: Proteus vulgaris group   Culture - Blood (21 @ 12:43)   Specimen Source: .Blood Blood-Peripheral   Culture Results: No growth to date.     Culture - Blood (21 @ 12:43)   Specimen Source: .Blood Blood-Peripheral   Culture Results: No growth to date.     Culture - Urine (21 @ 03:36)   Specimen Source: Clean Catch Clean Catch (Midstream)   Culture Results: >100,000 CFU/ml Proteus vulgaris group     COVID-19 PCR . (12.26.21 @ 20:36)   COVID-19 PCR: NotDetec:

## 2021-12-31 NOTE — PROGRESS NOTE ADULT - SUBJECTIVE AND OBJECTIVE BOX
CHIEF COMPLAINT:Patient is a 89y old  Female who presents with a chief complaint of Altered mental status.Pt appears comfortable.    	  REVIEW OF SYSTEMS:    [ x] Unable to obtain    PHYSICAL EXAM:  T(C): 36.3 (12-31-21 @ 08:12), Max: 37 (12-30-21 @ 23:47)  HR: 69 (12-31-21 @ 08:12) (67 - 97)  BP: 154/98 (12-31-21 @ 08:12) (134/96 - 167/52)  RR: 17 (12-31-21 @ 08:12) (17 - 19)  SpO2: 100% (12-31-21 @ 08:12) (97% - 100%)  Wt(kg): --  I&O's Summary    30 Dec 2021 07:01  -  31 Dec 2021 07:00  --------------------------------------------------------  IN: 660 mL / OUT: 825 mL / NET: -165 mL        Appearance: Normal	  HEENT:   Normal oral mucosa, PERRL, EOMI	  Lymphatic: No lymphadenopathy  Cardiovascular: Normal S1 S2, No JVD, No murmurs, No edema  Respiratory: Lungs clear to auscultation	  Gastrointestinal:  Soft, Non-tender, + BS	  Skin: No rashes, No ecchymoses, No cyanosis	  Extremities: Normal range of motion, No clubbing, cyanosis or edema  Vascular: Peripheral pulses palpable 2+ bilaterally    MEDICATIONS  (STANDING):  cefepime   IVPB      cefepime   IVPB 500 milliGRAM(s) IV Intermittent every 24 hours  dextrose 5% with potassium chloride 20 mEq/L 1000 milliLiter(s) (60 mL/Hr) IV Continuous <Continuous>  heparin   Injectable 5000 Unit(s) SubCutaneous every 8 hours  metoprolol tartrate Injectable 2.5 milliGRAM(s) IV Push every 6 hours  pantoprazole  Injectable 40 milliGRAM(s) IV Push at bedtime  sodium bicarbonate 650 milliGRAM(s) Oral three times a day        LABS:	 	                       12.2   7.29  )-----------( x        ( 31 Dec 2021 07:32 )             38.4     12-31    145  |  117<H>  |  17  ----------------------------<  81  4.1   |  21<L>  |  0.74    Ca    8.5      31 Dec 2021 07:32  Phos  2.6     12-31  Mg     1.9     12-31    TPro  5.6<L>  /  Alb  2.0<L>  /  TBili  0.9  /  DBili  x   /  AST  17  /  ALT  19  /  AlkPhos  117  12-31

## 2021-12-31 NOTE — CHART NOTE - NSCHARTNOTEFT_GEN_A_CORE
Patients labs and vitals reviewed. PATRICK and leukocytosis resolved.  Patient now with updated GOC and comfort measures only.  Please reconsult urology as needed Patients labs and vitals reviewed. PATRICK and leukocytosis resolved.  Patient now with updated GOC and comfort measures only.  Please reconsult urology as needed      I have discussed this case with Dr. March.  The patient should  be placed on suppressive antibiotic therapy as she developed these large stones over the course of the past 1 1/2  secondary to the presence of Proteus which is  urease splitting organism and in face of a alkaline urine, stones can develop quickly. Vitamin C should also be started to decrease the urine pH.

## 2021-12-31 NOTE — PROGRESS NOTE ADULT - SUBJECTIVE AND OBJECTIVE BOX
PGY-1 Progress Note discussed with attending    PAGER #: [[phone]] TILL 5:00 PM  PLEASE CONTACT ON CALL TEAM:  - On Call Team (Please refer to [facility]) FROM 5:00 PM - 8:30PM  - Nightfloat Team FROM 8:30 -7:30 AM    CHIEF COMPLAINT & BRIEF HOSPITAL COURSE:    INTERVAL HPI/OVERNIGHT EVENTS:       REVIEW OF SYSTEMS:  CONSTITUTIONAL: No fever, weight loss, or fatigue  RESPIRATORY: No cough, wheezing, chills or hemoptysis; No shortness of breath  CARDIOVASCULAR: No chest pain, palpitations, dizziness, or leg swelling  GASTROINTESTINAL: No abdominal pain. No nausea, vomiting, or hematemesis; No diarrhea or constipation. No melena or hematochezia.  GENITOURINARY: No dysuria or hematuria, urinary frequency  NEUROLOGICAL: No headaches, memory loss, loss of strength, numbness, or tremors  SKIN: No itching, burning, rashes, or lesions     Vital Signs Last 24 Hrs  T(C): 36.3 (31 Dec 2021 08:12), Max: 37 (30 Dec 2021 23:47)  T(F): 97.3 (31 Dec 2021 08:12), Max: 98.6 (30 Dec 2021 23:47)  HR: 69 (31 Dec 2021 08:12) (67 - 84)  BP: 154/98 (31 Dec 2021 08:12) (134/96 - 167/52)  BP(mean): --  RR: 17 (31 Dec 2021 08:12) (17 - 19)  SpO2: 100% (31 Dec 2021 08:12) (97% - 100%)    PHYSICAL EXAMINATION:  GENERAL: NAD, well built  HEAD:  Atraumatic, Normocephalic  EYES:  conjunctiva and sclera clear  NECK: Supple, No JVD, Normal thyroid  CHEST/LUNG: Clear to auscultation. Clear to percussion bilaterally; No rales, rhonchi, wheezing, or rubs  HEART: Regular rate and rhythm; No murmurs, rubs, or gallops  ABDOMEN: Soft, Nontender, Nondistended; Bowel sounds present  NERVOUS SYSTEM:  Alert & Oriented X3,    EXTREMITIES:  2+ Peripheral Pulses, No clubbing, cyanosis, or edema  SKIN: warm dry                          12.2   7.29  )-----------( 94       ( 31 Dec 2021 07:32 )             38.4     12-31    145  |  117<H>  |  17  ----------------------------<  81  4.1   |  21<L>  |  0.74    Ca    8.5      31 Dec 2021 07:32  Phos  2.6     12-31  Mg     1.9     12-31    TPro  5.6<L>  /  Alb  2.0<L>  /  TBili  0.9  /  DBili  x   /  AST  17  /  ALT  19  /  AlkPhos  117  12-31    LIVER FUNCTIONS - ( 31 Dec 2021 07:32 )  Alb: 2.0 g/dL / Pro: 5.6 g/dL / ALK PHOS: 117 U/L / ALT: 19 U/L DA / AST: 17 U/L / GGT: x                   CAPILLARY BLOOD GLUCOSE      RADIOLOGY & ADDITIONAL TESTS:                   PGY-1 Progress Note discussed with attending    PAGER #: [[phone]] TILL 5:00 PM  PLEASE CONTACT ON CALL TEAM:  - On Call Team (Please refer to [facility]) FROM 5:00 PM - 8:30PM  - Nightfloat Team FROM 8:30 -7:30 AM      INTERVAL HPI/OVERNIGHT EVENTS:  No overnight events. Pt seen and examined by bedside, a little bit more alert today. However, cannot assess for ROS.    Vital Signs Last 24 Hrs  T(C): 36.3 (31 Dec 2021 08:12), Max: 37 (30 Dec 2021 23:47)  T(F): 97.3 (31 Dec 2021 08:12), Max: 98.6 (30 Dec 2021 23:47)  HR: 69 (31 Dec 2021 08:12) (67 - 84)  BP: 154/98 (31 Dec 2021 08:12) (134/96 - 167/52)  BP(mean): --  RR: 17 (31 Dec 2021 08:12) (17 - 19)  SpO2: 100% (31 Dec 2021 08:12) (97% - 100%)      PHYSICAL EXAMINATION:  GENERAL: NAD, thin  HEAD:  Atraumatic, Normocephalic  EYES:  conjunctiva and sclera clear  NECK: Supple, No JVD  CHEST/LUNG: Clear to auscultation. Clear to percussion bilaterally; No rales, rhonchi, wheezing, or rubs  HEART: Regular rate and rhythm; No murmurs, rubs, or gallops  ABDOMEN: Soft, Nontender, Nondistended; Bowel sounds present  NERVOUS SYSTEM:  Alert & Oriented X0,    EXTREMITIES:  2+ Peripheral Pulses, No clubbing, cyanosis, or edema  SKIN: warm dry                     12.2   7.29  )-----------( 94       ( 31 Dec 2021 07:32 )             38.4     12-31    145  |  117<H>  |  17  ----------------------------<  81  4.1   |  21<L>  |  0.74    Ca    8.5      31 Dec 2021 07:32  Phos  2.6     12-31  Mg     1.9     12-31    TPro  5.6<L>  /  Alb  2.0<L>  /  TBili  0.9  /  DBili  x   /  AST  17  /  ALT  19  /  AlkPhos  117  12-31    LIVER FUNCTIONS - ( 31 Dec 2021 07:32 )  Alb: 2.0 g/dL / Pro: 5.6 g/dL / ALK PHOS: 117 U/L / ALT: 19 U/L DA / AST: 17 U/L / GGT: x                   CAPILLARY BLOOD GLUCOSE      RADIOLOGY & ADDITIONAL TESTS:

## 2022-01-01 ENCOUNTER — TRANSCRIPTION ENCOUNTER (OUTPATIENT)
Age: 87
End: 2022-01-01

## 2022-01-01 LAB
ALBUMIN SERPL ELPH-MCNC: 1.8 G/DL — LOW (ref 3.5–5)
ALP SERPL-CCNC: 112 U/L — SIGNIFICANT CHANGE UP (ref 40–120)
ALT FLD-CCNC: 16 U/L DA — SIGNIFICANT CHANGE UP (ref 10–60)
ANION GAP SERPL CALC-SCNC: 5 MMOL/L — SIGNIFICANT CHANGE UP (ref 5–17)
AST SERPL-CCNC: 12 U/L — SIGNIFICANT CHANGE UP (ref 10–40)
BILIRUB SERPL-MCNC: 0.9 MG/DL — SIGNIFICANT CHANGE UP (ref 0.2–1.2)
BUN SERPL-MCNC: 11 MG/DL — SIGNIFICANT CHANGE UP (ref 7–18)
CALCIUM SERPL-MCNC: 7.9 MG/DL — LOW (ref 8.4–10.5)
CHLORIDE SERPL-SCNC: 113 MMOL/L — HIGH (ref 96–108)
CO2 SERPL-SCNC: 23 MMOL/L — SIGNIFICANT CHANGE UP (ref 22–31)
CREAT SERPL-MCNC: 0.5 MG/DL — SIGNIFICANT CHANGE UP (ref 0.5–1.3)
CULTURE RESULTS: SIGNIFICANT CHANGE UP
CULTURE RESULTS: SIGNIFICANT CHANGE UP
GLUCOSE BLDC GLUCOMTR-MCNC: 105 MG/DL — HIGH (ref 70–99)
GLUCOSE BLDC GLUCOMTR-MCNC: 92 MG/DL — SIGNIFICANT CHANGE UP (ref 70–99)
GLUCOSE BLDC GLUCOMTR-MCNC: 95 MG/DL — SIGNIFICANT CHANGE UP (ref 70–99)
GLUCOSE BLDC GLUCOMTR-MCNC: 98 MG/DL — SIGNIFICANT CHANGE UP (ref 70–99)
GLUCOSE SERPL-MCNC: 101 MG/DL — HIGH (ref 70–99)
HCT VFR BLD CALC: 37.1 % — SIGNIFICANT CHANGE UP (ref 34.5–45)
HGB BLD-MCNC: 11.8 G/DL — SIGNIFICANT CHANGE UP (ref 11.5–15.5)
MAGNESIUM SERPL-MCNC: 1.7 MG/DL — SIGNIFICANT CHANGE UP (ref 1.6–2.6)
MCHC RBC-ENTMCNC: 30.3 PG — SIGNIFICANT CHANGE UP (ref 27–34)
MCHC RBC-ENTMCNC: 31.8 GM/DL — LOW (ref 32–36)
MCV RBC AUTO: 95.1 FL — SIGNIFICANT CHANGE UP (ref 80–100)
NRBC # BLD: 0 /100 WBCS — SIGNIFICANT CHANGE UP (ref 0–0)
PHOSPHATE SERPL-MCNC: 2 MG/DL — LOW (ref 2.5–4.5)
PLATELET # BLD AUTO: 165 K/UL — SIGNIFICANT CHANGE UP (ref 150–400)
POTASSIUM SERPL-MCNC: 3.4 MMOL/L — LOW (ref 3.5–5.3)
POTASSIUM SERPL-SCNC: 3.4 MMOL/L — LOW (ref 3.5–5.3)
PROT SERPL-MCNC: 5.7 G/DL — LOW (ref 6–8.3)
RBC # BLD: 3.9 M/UL — SIGNIFICANT CHANGE UP (ref 3.8–5.2)
RBC # FLD: 15.1 % — HIGH (ref 10.3–14.5)
SODIUM SERPL-SCNC: 141 MMOL/L — SIGNIFICANT CHANGE UP (ref 135–145)
SPECIMEN SOURCE: SIGNIFICANT CHANGE UP
SPECIMEN SOURCE: SIGNIFICANT CHANGE UP
WBC # BLD: 5.78 K/UL — SIGNIFICANT CHANGE UP (ref 3.8–10.5)
WBC # FLD AUTO: 5.78 K/UL — SIGNIFICANT CHANGE UP (ref 3.8–10.5)

## 2022-01-01 RX ORDER — SODIUM CHLORIDE 9 MG/ML
1000 INJECTION, SOLUTION INTRAVENOUS
Refills: 0 | Status: DISCONTINUED | OUTPATIENT
Start: 2022-01-01 | End: 2022-01-02

## 2022-01-01 RX ADMIN — CEFEPIME 100 MILLIGRAM(S): 1 INJECTION, POWDER, FOR SOLUTION INTRAMUSCULAR; INTRAVENOUS at 04:13

## 2022-01-01 RX ADMIN — HEPARIN SODIUM 5000 UNIT(S): 5000 INJECTION INTRAVENOUS; SUBCUTANEOUS at 07:00

## 2022-01-01 RX ADMIN — HEPARIN SODIUM 5000 UNIT(S): 5000 INJECTION INTRAVENOUS; SUBCUTANEOUS at 14:23

## 2022-01-01 RX ADMIN — Medication 2.5 MILLIGRAM(S): at 17:03

## 2022-01-01 RX ADMIN — Medication 650 MILLIGRAM(S): at 07:03

## 2022-01-01 RX ADMIN — Medication 2.5 MILLIGRAM(S): at 12:19

## 2022-01-01 RX ADMIN — SODIUM CHLORIDE 60 MILLILITER(S): 9 INJECTION, SOLUTION INTRAVENOUS at 14:23

## 2022-01-01 RX ADMIN — Medication 2.5 MILLIGRAM(S): at 06:58

## 2022-01-01 NOTE — PROGRESS NOTE ADULT - SUBJECTIVE AND OBJECTIVE BOX
Patient is seen and examined at the bed side, is afebrile. She is doing better, mental status has improved.      REVIEW OF SYSTEMS: Unable to obtain due to mental status      ALLERGIES: sulfa drugs (Hives)      Vital Signs Last 24 Hrs  T(C): 36.4 (2022 15:42), Max: 36.4 (2022 05:09)  T(F): 97.5 (2022 15:42), Max: 97.5 (2022 05:09)  HR: 63 (2022 15:42) (54 - 75)  BP: 152/92 (2022 15:42) (150/89 - 188/102)  BP(mean): --  RR: 18 (2022 15:42) (18 - 18)  SpO2: 98% (2022 15:42) (96% - 99%)      PHYSICAL EXAM:  GENERAL: Not in distress   CHEST/LUNG:  Not using accessory muscles  HEART: s1 and s2 present  ABDOMEN:  Nontender and  Nondistended  EXTREMITIES: No pedal  edema  CNS: More alert      LABS:                          11.8   5.78  )-----------( 165      ( 2022 08:32 )             37.1                           12.2   7.29  )-----------( 94       ( 31 Dec 2021 07:32 )             38.4                         9.8    18.53 )-----------( 138      ( 28 Dec 2021 07:26 )             30.7           12    145  |  117<H>  |  17  ----------------------------<  81  4.1   |  21<L>  |  0.74    Ca    8.5      31 Dec 2021 07:32  Phos  2.6       Mg     1.9         TPro  5.6<L>  /  Alb  2.0<L>  /  TBili  0.9  /  DBili  x   /  AST  17  /  ALT  19  /  AlkPhos  117      12    149<H>  |  121<H>  |  52<H>  ----------------------------<  39<LL>  4.7   |  18<L>  |  2.01<H>    Ca    7.8<L>      28 Dec 2021 07:08  Phos  3.6       Mg     1.7         TPro  5.2<L>  /  Alb  1.7<L>  /  TBili  0.8  /  DBili  x   /  AST  29  /  ALT  33  /  AlkPhos  100  12-    PT/INR - ( 27 Dec 2021 04:52 )   PT: 12.5 sec;   INR: 1.05 ratio    PTT - ( 27 Dec 2021 04:52 )  PTT:29.6 sec        CAPILLARY BLOOD GLUCOSE  POCT Blood Glucose.: 80 mg/dL (27 Dec 2021 04:17)        Urinalysis Basic - ( 26 Dec 2021 20:36 )  Color: Red / Appearance: bloody / S.010 / pH: x  Gluc: x / Ketone: Negative  / Bili: Negative / Urobili: Negative   Blood: x / Protein: 100 / Nitrite: Positive   Leuk Esterase: Moderate / RBC: >50 /HPF / WBC >50 /HPF   Sq Epi: x / Non Sq Epi: Few /HPF / Bacteria: Moderate /HPF        MEDICATIONS  (STANDING    cefepime   IVPB      cefepime   IVPB 500 milliGRAM(s) IV Intermittent every 24 hours  dextrose 5% + sodium chloride 0.45%. 1000 milliLiter(s) (60 mL/Hr) IV Continuous <Continuous>  heparin   Injectable 5000 Unit(s) SubCutaneous every 8 hours  metoprolol tartrate Injectable 2.5 milliGRAM(s) IV Push every 6 hours  pantoprazole  Injectable 40 milliGRAM(s) IV Push at bedtime  sodium bicarbonate 650 milliGRAM(s) Oral three times a day      RADIOLOGY & ADDITIONAL TESTS:    21: CT Abdomen and Pelvis No Cont (21 @ 20:03) Significantly limited exam due to motion artifact and noncontrast technique.    2.9 cm stone and 1.3 cm stone at the level of the left renal pelvis  possibly extending into the UPJ, difficult to evaluate. At least mild   left upper pole hydronephrosis and indeterminate lower pole hydronephrosis secondary to possible parapelvic cyst in this location.   Mild left perinephric stranding. Recommend correlation with urinalysis.    21: Xray Chest 1 View-PORTABLE IMMEDIATE (Xray Chest 1 View-PORTABLE IMMEDIATE .) (21 @ 20:52) Right perihilar opacity, differential diagnosis includes focal  consolidation and aortic aneurysm      MICROBIOLOGY DATA:    Culture - Blood (21 @ 13:34)   Specimen Source: .Blood Blood   Culture Results: No growth to date.     Culture - Blood (21 @ 13:34)   Specimen Source: .Blood Blood   Culture Results: No growth to date.     MRSA/MSSA PCR (21 @ 10:45)   MRSA PCR Result.: NotDetec:    Culture - Urine (21 @ 03:36)   - Trimethoprim/Sulfamethoxazole: S <=0.5/9.5   - Cefepime: S <=2   - Cefoxitin: S <=8   - Ceftriaxone: S <=1 Enterobacter, Klebsiella aerogenes, Citrobacter, and Serratia may develop resistance during prolonged therapy   - Ciprofloxacin: S <=0.25   - Ertapenem: S <=0.5   - Gentamicin: S <=2   - Levofloxacin: S <=0.5   - Meropenem: S <=1   - Nitrofurantoin: R >64 Should not be used to treat pyelonephritis   - Piperacillin/Tazobactam: S <=8   - Tigecycline: R <=2   - Tobramycin: S 4   - Amikacin: S <=16   - Amoxicillin/Clavulanic Acid: S <=8/4   - Ampicillin: R >16 These ampicillin results predict results for amoxicillin   - Ampicillin/Sulbactam: S <=4/2 Enterobacter, Klebsiella aerogenes, Citrobacter, and Serratia may develop resistance during prolonged therapy (3-4 days)   - Aztreonam: S <=4   - Cefazolin: R >16   Specimen Source: Clean Catch Clean Catch (Midstream)   Culture Results:   >100,000 CFU/ml Proteus vulgaris group   Organism Identification: Proteus vulgaris group   Culture - Blood (21 @ 12:43)   Specimen Source: .Blood Blood-Peripheral   Culture Results: No growth to date.     Culture - Blood (21 @ 12:43)   Specimen Source: .Blood Blood-Peripheral   Culture Results: No growth to date.     Culture - Urine (21 @ 03:36)   Specimen Source: Clean Catch Clean Catch (Midstream)   Culture Results: >100,000 CFU/ml Proteus vulgaris group     COVID-19 PCR . (12.26.21 @ 20:36)   COVID-19 PCR: NotDetec:

## 2022-01-01 NOTE — DISCHARGE NOTE PROVIDER - NSDCCPCAREPLAN_GEN_ALL_CORE_FT
PRINCIPAL DISCHARGE DIAGNOSIS  Diagnosis: Acute UTI  Assessment and Plan of Treatment: You came in because of altered mental staus. In the emergency departement you were found to have a positive urine analysis for infection. However, you did not have clinical markers for severe infection. Your were initially treated with ceftriaxone and then switched to cefepime. Urine cultures were positve for bacterial growth. Blood cultures have been negative throughout. So far you have completed ____ days of antibiotic.  On discharge you will need to continue treatment with Augmentin 875mg daily plus Hiprex as prophylaxis against recurring urinary infections      SECONDARY DISCHARGE DIAGNOSES  Diagnosis: Acute encephalopathy  Assessment and Plan of Treatment: you have history of dementia. However, when you were brought to the ED you had worsening of your mental status which was atributed to the fact that you had an active infection. During your hospital stay your mental status improved, currently at baseline (waxing and waning).    Diagnosis: PATRICK (acute kidney injury)  Assessment and Plan of Treatment: On admission to the hospital you had elevated renal function. Imaging of your abdomen and pelvis showed stones and hydronephrosis (Fluid accumulation in your kidneys), because of this urology service was consulted. They recommended that if you deteriorated clinically (ie worsening renal function) you should have a nephrostomy tube placed. However, your renal function improved most likely as you passed the obstructing stone.       Diagnosis: Elevated troponin level  Assessment and Plan of Treatment: On admission you were found to have elevated cardiac enzymnes. However, such proteins trended down. This elevartion was considered to be secondary to acute active infection. Cardiology service evaluated you and recommneded no acute intervention.     PRINCIPAL DISCHARGE DIAGNOSIS  Diagnosis: Acute UTI  Assessment and Plan of Treatment: You came in because of altered mental staus. In the emergency departement you were found to have a positive urine analysis for infection. However, you did not have clinical markers for severe infection. Your were initially treated with ceftriaxone and then switched to cefepime. Urine cultures were positve for bacterial growth. Blood cultures have been negative throughout. So far you have completed 9 days of antibiotic.  On discharge you will need to continue treatment with Augmentin 875mg daily until 1/6/22 plus Hiprex as prophylaxis against recurring urinary infections      SECONDARY DISCHARGE DIAGNOSES  Diagnosis: Acute encephalopathy  Assessment and Plan of Treatment: you have history of dementia. However, when you were brought to the ED you had worsening of your mental status which was atributed to the fact that you had an active infection. During your hospital stay your mental status improved, currently at baseline (waxing and waning).    Diagnosis: Elevated troponin level  Assessment and Plan of Treatment: On admission you were found to have elevated cardiac enzymnes. However, such proteins trended down. This elevartion was considered to be secondary to acute active infection. Cardiology service evaluated you and recommneded no acute intervention.    Diagnosis: PATRICK (acute kidney injury)  Assessment and Plan of Treatment: On admission to the hospital you had elevated renal function. Imaging of your abdomen and pelvis showed stones and hydronephrosis (Fluid accumulation in your kidneys), because of this urology service was consulted. They recommended that if you deteriorated clinically (ie worsening renal function) you should have a nephrostomy tube placed. However, your renal function improved most likely as you passed the obstructing stone.        PRINCIPAL DISCHARGE DIAGNOSIS  Diagnosis: Acute UTI  Assessment and Plan of Treatment: You came in because of altered mental staus. In the emergency departement you were found to have a positive urine analysis for infection. However, you did not have clinical markers for severe infection. Your were initially treated with ceftriaxone and then switched to cefepime. Urine cultures were positve for bacterial growth. Blood cultures have been negative throughout. So far you have completed 9 days of antibiotic.  On discharge you will need to continue treatment with Augmentin 875mg daily until 1/6/22 plus Hiprex as prophylaxis against recurring urinary infections.  You are being discharged to home hospice as discussed.      SECONDARY DISCHARGE DIAGNOSES  Diagnosis: Acute encephalopathy  Assessment and Plan of Treatment: you have history of dementia. However, when you were brought to the ED you had worsening of your mental status which was atributed to the fact that you had an active infection. During your hospital stay your mental status improved, currently at baseline (waxing and waning).  Patient is being discharged to home hospice.    Diagnosis: Elevated troponin level  Assessment and Plan of Treatment: On admission you were found to have elevated cardiac enzymnes. However, such proteins trended down. This elevartion was considered to be secondary to acute active infection. Cardiology service evaluated you and recommneded no acute intervention.    Diagnosis: PATRICK (acute kidney injury)  Assessment and Plan of Treatment: On admission to the hospital you had elevated renal function. Imaging of your abdomen and pelvis showed stones and hydronephrosis (Fluid accumulation in your kidneys), because of this urology service was consulted. They recommended that if you deteriorated clinically (ie worsening renal function) you should have a nephrostomy tube placed. However, your renal function improved most likely as you passed the obstructing stone.

## 2022-01-01 NOTE — PROGRESS NOTE ADULT - SUBJECTIVE AND OBJECTIVE BOX
CHIEF COMPLAINT:Patient is a 89y old  Female who presents with a chief complaint of Altered mental status.Pt appears comfortable.    	  REVIEW OF SYSTEMS:  unable to obtain	        PHYSICAL EXAM:  T(C): 36.3 (01-01-22 @ 10:12), Max: 37.1 (12-31-21 @ 11:50)  HR: 62 (01-01-22 @ 10:12) (54 - 75)  BP: 162/86 (01-01-22 @ 10:12) (150/89 - 188/102)  RR: 18 (01-01-22 @ 10:12) (17 - 18)  SpO2: 96% (01-01-22 @ 10:12) (96% - 100%)  Wt(kg): --  I&O's Summary    31 Dec 2021 07:01  -  01 Jan 2022 07:00  --------------------------------------------------------  IN: 0 mL / OUT: 300 mL / NET: -300 mL        Appearance: Normal	  HEENT:   Normal oral mucosa, PERRL, EOMI	  Lymphatic: No lymphadenopathy  Cardiovascular: Normal S1 S2, No JVD, No murmurs, No edema  Respiratory: Lungs clear to auscultation	  Gastrointestinal:  Soft, Non-tender, + BS	  Skin: No rashes, No ecchymoses, No cyanosis	  Extremities: Normal range of motion, No clubbing, cyanosis or edema  Vascular: Peripheral pulses palpable 2+ bilaterally    MEDICATIONS  (STANDING):  cefepime   IVPB      cefepime   IVPB 500 milliGRAM(s) IV Intermittent every 24 hours  dextrose 5% + sodium chloride 0.45%. 1000 milliLiter(s) (60 mL/Hr) IV Continuous <Continuous>  heparin   Injectable 5000 Unit(s) SubCutaneous every 8 hours  metoprolol tartrate Injectable 2.5 milliGRAM(s) IV Push every 6 hours  pantoprazole  Injectable 40 milliGRAM(s) IV Push at bedtime  sodium bicarbonate 650 milliGRAM(s) Oral three times a day      	  	  LABS:	 	                       11.8   5.78  )-----------( 165      ( 01 Jan 2022 08:32 )             37.1     01-01    141  |  113<H>  |  11  ----------------------------<  101<H>  3.4<L>   |  23  |  0.50    Ca    7.9<L>      01 Jan 2022 08:32  Phos  2.0     01-01  Mg     1.7     01-01    TPro  5.7<L>  /  Alb  1.8<L>  /  TBili  0.9  /  DBili  x   /  AST  12  /  ALT  16  /  AlkPhos  112  01-01    proBNP:   Lipid Profile:   HgA1c:   TSH:

## 2022-01-01 NOTE — PROGRESS NOTE ADULT - SUBJECTIVE AND OBJECTIVE BOX
PARAG SINCLAIR  MR# [id number]  89yFemale        Patient is a 89y old  Female who presents with a chief complaint of Altered mental status (01 Jan 2022 11:41)      INTERVAL HPI/OVERNIGHT EVENTS:  Patient seen and examined at bedside. No notations of chest pain, palpitation, SOB, orthopnea, nausea, vomiting or abdominal pain.    ALLERGIES  sulfa drugs (Hives)      MEDICATIONS  acetaminophen     Tablet .. 650 milliGRAM(s) Oral every 6 hours PRN Mild Pain (1 - 3)  cefepime   IVPB      cefepime   IVPB 500 milliGRAM(s) IV Intermittent every 24 hours  dextrose 5% + sodium chloride 0.45%. 1000 milliLiter(s) IV Continuous <Continuous>  heparin   Injectable 5000 Unit(s) SubCutaneous every 8 hours  metoprolol tartrate Injectable 2.5 milliGRAM(s) IV Push every 6 hours  pantoprazole  Injectable 40 milliGRAM(s) IV Push at bedtime  sodium bicarbonate 650 milliGRAM(s) Oral three times a day              REVIEW OF SYSTEMS:  CONSTITUTIONAL: No fever, weight loss, or fatigue  EYES: No eye pain, visual disturbances, or discharge  ENT:  No difficulty hearing, tinnitus, vertigo; No sinus or throat pain  NECK: No pain or stiffness  RESPIRATORY: No cough, wheezing, chills or hemoptysis; No Shortness of Breath  CARDIOVASCULAR: No chest pain, palpitations, passing out, dizziness, or leg swelling  GASTROINTESTINAL: No abdominal or epigastric pain. No nausea, vomiting, or hematemesis; No diarrhea or constipation. No melena or hematochezia.  GENITOURINARY: No dysuria, frequency, hematuria, or incontinence  NEUROLOGICAL: No headaches, memory loss, loss of strength, numbness, or tremors  SKIN: No itching, burning, rashes, or lesions   LYMPH Nodes: No enlarged glands  ENDOCRINE: No heat or cold intolerance; No hair loss  MUSCULOSKELETAL: No joint pain or swelling; No muscle, back, or extremity pain  PSYCHIATRIC: No depression, anxiety, mood swings, or difficulty sleeping  HEME/LYMPH: No easy bruising, or bleeding gums  ALLERGY AND IMMUNOLOGIC: No hives or eczema	    [ ] All others negative	  [ ] Unable to obtain      T(C): 36.4 (01-01-22 @ 15:42), Max: 36.4 (01-01-22 @ 05:09)  T(F): 97.5 (01-01-22 @ 15:42), Max: 97.5 (01-01-22 @ 05:09)  HR: 63 (01-01-22 @ 15:42) (54 - 75)  BP: 152/92 (01-01-22 @ 15:42) (150/89 - 188/102)  RR: 18 (01-01-22 @ 15:42) (18 - 18)  SpO2: 98% (01-01-22 @ 15:42) (96% - 99%)  Wt(kg): --    I&O's Summary    31 Dec 2021 07:01  -  01 Jan 2022 07:00  --------------------------------------------------------  IN: 0 mL / OUT: 300 mL / NET: -300 mL    01 Jan 2022 07:01  -  01 Jan 2022 21:16  --------------------------------------------------------  IN: 480 mL / OUT: 550 mL / NET: -70 mL          PHYSICAL EXAM:  A X O x  HEAD:  Atraumatic, Normocephalic  EYES: EOMI, PERRLA, conjunctiva and sclera clear  NECK: Supple, No JVD, Normal thyroid  Resp: CTAB, No crackles, wheezing,   CVS: Regular rate and rhythm; No discernable murmurs, rubs, or gallops  ABD: Soft, Nontender, Nondistended; Bowel sounds present  EXTREMITIES:  2+ Peripheral Pulses, No edema  LYMPH: No dicernable lymphadenopathy noted  GENERAL: NAD, well-groomed, well-developed      LABS:                        11.8   5.78  )-----------( 165      ( 01 Jan 2022 08:32 )             37.1     01-01    141  |  113<H>  |  11  ----------------------------<  101<H>  3.4<L>   |  23  |  0.50    Ca    7.9<L>      01 Jan 2022 08:32  Phos  2.0     01-01  Mg     1.7     01-01    TPro  5.7<L>  /  Alb  1.8<L>  /  TBili  0.9  /  DBili  x   /  AST  12  /  ALT  16  /  AlkPhos  112  01-01        CAPILLARY BLOOD GLUCOSE      POCT Blood Glucose.: 92 mg/dL (01 Jan 2022 16:39)  POCT Blood Glucose.: 98 mg/dL (01 Jan 2022 11:52)  POCT Blood Glucose.: 105 mg/dL (01 Jan 2022 07:18)  POCT Blood Glucose.: 95 mg/dL (01 Jan 2022 05:46)  POCT Blood Glucose.: 94 mg/dL (31 Dec 2021 21:21)      Troponins:  ProBNP:  Lipid Profile:   HgA1c:  TSH:           RADIOLOGY & ADDITIONAL TESTS:    Imaging Personally Reviewed:  [ ] YES  [ ] NO      Consultant(s) Notes Reviewed:  [x ] YES  [ ] NO    Care Discussed with Consultants/Other Providers [ x] YES  [ ] NO          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    Glaucoma    Urinary Frequency    Prolapse of Vaginal Wall    Back pain    Bacterial UTI    S/P Hip Replacement  2002    Bilateral Cataracts removed 2010    S/P Colonoscopy          Non-ST elevation myocardial infarction (NSTEMI)    Yes    Handoff    MEWS Score    H/O: Hypertension    HLD (Hyperlipidemia)    Functional Heart Murmur    MVP (Mitral Valve Prolapse)    Asthma    Hernia, Hiatal    H/O: Osteoarthritis    History of Spinal Stenosis    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

## 2022-01-01 NOTE — DISCHARGE NOTE PROVIDER - NSDCMRMEDTOKEN_GEN_ALL_CORE_FT
AZO Urinary Pain Relief 95 mg oral tablet: 2 tab(s) orally , As Needed  omeprazole 20 mg oral delayed release capsule: 1 cap(s) orally once a day  pantoprazole 40 mg oral delayed release tablet: 1 tab(s) orally once a day  Tums 500 mg oral tablet, chewable: 1 tab(s) orally , As Needed  Tylenol 325 mg oral tablet: 2 tab(s) orally every 4 hours   amoxicillin-clavulanate 875 mg-125 mg oral tablet: 1 tab(s) orally every 12 hours   cloNIDine 0.1 mg/24 hr transdermal film, extended release: 1 patch transdermal every 7 days  methenamine mandelate 1 g oral tablet: 1 tab(s) orally 2 times a day   omeprazole 20 mg oral delayed release capsule: 1 cap(s) orally once a day  sodium bicarbonate 650 mg oral tablet: 1 tab(s) orally 3 times a day  Tylenol 325 mg oral tablet: 2 tab(s) orally every 4 hours

## 2022-01-01 NOTE — DISCHARGE NOTE PROVIDER - HOSPITAL COURSE
Patient is 89 years old female ( on wheel chair ) with past history of Right arthroplasty, chronic back pain, recurrent UTIs, dementia, presented today to ED with AMS. Patient is very poor historian, so history was obtained by daughter. Patient was admitted before to [facility] for previous UTIs. .   In the ED pt found afebrile. NVS.  Labs on admission significant for positive UA, elevated troponin at 93, SCr 1.65. CT A7P showed 2.9 cm X 1.3 cm stone at the level of L renal pelvis extending into the UPJ, hydronephrosis. Pt was admitted for acute UTI,  on chronic encephalopathy, most likely metabolic in the s/o UTI, acute UTI,  PATRICK and elevated CE    Pt was started initially on Rocephin, evaluated by ID specialist and switched to Cefepime as per their recommendations. Urine cultures showed Proteus, sensitive to cefepime.  BCX have showed NGTD. Pt has completed __________ of ABX. Regarding PATRICK, most likely post-renal in the s/o renal stones and hydronephrosis, she was evaluated by urology service, recommended pt to have nephrostomy tube placement if she clinically deteriorated. However, SCr trended down and PATRICK resolved. Elevated CE trended down, most likely demand ischemia in the s/o acute infection. She was evaluated by cardiology service, recommending not further testing. Pt MS currently at her baseline, waxing and waning. Palliative care was consulted, GOC were discussed with her daughter, new MOLST drafted; DNR/DNI/no feeding tube/trial of IVF/comfort measures only/DNH. Pt to go home on home hospice.    On DC pt will go home on Augmentin 875mg daily plus Hiprex as prophylaxis against recurring UTI.    Patient is 89 years old female ( on wheel chair ) with past history of Right arthroplasty, chronic back pain, recurrent UTIs, dementia, presented today to ED with AMS. Patient is very poor historian, so history was obtained by daughter. Patient was admitted before to [facility] for previous UTIs. .   In the ED pt found afebrile. NVS.  Labs on admission significant for positive UA, elevated troponin at 93, SCr 1.65. CT A7P showed 2.9 cm X 1.3 cm stone at the level of L renal pelvis extending into the UPJ, hydronephrosis. Pt was admitted for acute UTI,  on chronic encephalopathy, most likely metabolic in the s/o UTI, acute UTI,  PATRICK and elevated CE    Pt was started initially on Rocephin, evaluated by ID specialist and switched to Cefepime as per their recommendations. Urine cultures showed Proteus, sensitive to cefepime.  BCX have showed NGTD. Pt has completed 9 days of ABX. Regarding PATRICK, most likely post-renal in the s/o renal stones and hydronephrosis, she was evaluated by urology service, recommended pt to have nephrostomy tube placement if she clinically deteriorated. However, SCr trended down and PATRICK resolved. Elevated CE trended down, most likely demand ischemia in the s/o acute infection. She was evaluated by cardiology service, recommending not further testing. Pt MS currently at her baseline, waxing and waning. Palliative care was consulted, GOC were discussed with her daughter, new MOLST drafted; DNR/DNI/no feeding tube/trial of IVF/comfort measures only/DNH. Pt to go home on home hospice.    On DC pt will go home on Augmentin 875mg daily plus Hiprex as prophylaxis against recurring UTI.    Patient is 89 years old female ( on wheel chair ) with past history of Right arthroplasty, chronic back pain, recurrent UTIs, dementia, presented today to ED with AMS. Patient is very poor historian, so history was obtained by daughter. Patient was admitted before to [facility] for previous UTIs. .   In the ED pt found afebrile. NVS.  Labs on admission significant for positive UA, elevated troponin at 93, SCr 1.65. CT A7P showed 2.9 cm X 1.3 cm stone at the level of L renal pelvis extending into the UPJ, hydronephrosis. Pt was admitted for acute UTI,  on chronic encephalopathy, most likely metabolic in the s/o UTI, acute UTI,  PATRICK and elevated CE    Pt was started initially on Rocephin, evaluated by ID specialist and switched to Cefepime as per their recommendations. Urine cultures showed Proteus, sensitive to cefepime.  BCX have showed NGTD. Pt has completed 9 days of ABX. Regarding PATRICK, most likely post-renal in the s/o renal stones and hydronephrosis, she was evaluated by urology service, recommended pt to have nephrostomy tube placement if she clinically deteriorated. However, SCr trended down and PATRICK resolved. Elevated CE trended down, most likely demand ischemia in the s/o acute infection. She was evaluated by cardiology service, recommending not further testing. Pt MS currently at her baseline, waxing and waning. Palliative care was consulted, GOC were discussed with her daughter, new MOLST drafted; DNR/DNI/no feeding tube/trial of IVF/comfort measures only/DNH. Pt to go home on home hospice.    On DC pt will go home on Augmentin 875mg daily plus Hiprex as prophylaxis against recurring UTI. Patient is stable for discharge case discussed with attending.

## 2022-01-02 LAB
ALBUMIN SERPL ELPH-MCNC: 1.9 G/DL — LOW (ref 3.5–5)
ALP SERPL-CCNC: 112 U/L — SIGNIFICANT CHANGE UP (ref 40–120)
ALT FLD-CCNC: 16 U/L DA — SIGNIFICANT CHANGE UP (ref 10–60)
ANION GAP SERPL CALC-SCNC: 8 MMOL/L — SIGNIFICANT CHANGE UP (ref 5–17)
AST SERPL-CCNC: 17 U/L — SIGNIFICANT CHANGE UP (ref 10–40)
BILIRUB SERPL-MCNC: 1.1 MG/DL — SIGNIFICANT CHANGE UP (ref 0.2–1.2)
BUN SERPL-MCNC: 9 MG/DL — SIGNIFICANT CHANGE UP (ref 7–18)
CALCIUM SERPL-MCNC: 8.3 MG/DL — LOW (ref 8.4–10.5)
CHLORIDE SERPL-SCNC: 111 MMOL/L — HIGH (ref 96–108)
CO2 SERPL-SCNC: 23 MMOL/L — SIGNIFICANT CHANGE UP (ref 22–31)
CREAT SERPL-MCNC: 0.58 MG/DL — SIGNIFICANT CHANGE UP (ref 0.5–1.3)
GLUCOSE BLDC GLUCOMTR-MCNC: 67 MG/DL — LOW (ref 70–99)
GLUCOSE BLDC GLUCOMTR-MCNC: 80 MG/DL — SIGNIFICANT CHANGE UP (ref 70–99)
GLUCOSE BLDC GLUCOMTR-MCNC: 82 MG/DL — SIGNIFICANT CHANGE UP (ref 70–99)
GLUCOSE BLDC GLUCOMTR-MCNC: 86 MG/DL — SIGNIFICANT CHANGE UP (ref 70–99)
GLUCOSE SERPL-MCNC: 68 MG/DL — LOW (ref 70–99)
HCT VFR BLD CALC: 39.7 % — SIGNIFICANT CHANGE UP (ref 34.5–45)
HGB BLD-MCNC: 12.7 G/DL — SIGNIFICANT CHANGE UP (ref 11.5–15.5)
MAGNESIUM SERPL-MCNC: 1.8 MG/DL — SIGNIFICANT CHANGE UP (ref 1.6–2.6)
MCHC RBC-ENTMCNC: 30.6 PG — SIGNIFICANT CHANGE UP (ref 27–34)
MCHC RBC-ENTMCNC: 32 GM/DL — SIGNIFICANT CHANGE UP (ref 32–36)
MCV RBC AUTO: 95.7 FL — SIGNIFICANT CHANGE UP (ref 80–100)
NRBC # BLD: 0 /100 WBCS — SIGNIFICANT CHANGE UP (ref 0–0)
PHOSPHATE SERPL-MCNC: 2.4 MG/DL — LOW (ref 2.5–4.5)
PLATELET # BLD AUTO: 194 K/UL — SIGNIFICANT CHANGE UP (ref 150–400)
POTASSIUM SERPL-MCNC: 3.5 MMOL/L — SIGNIFICANT CHANGE UP (ref 3.5–5.3)
POTASSIUM SERPL-SCNC: 3.5 MMOL/L — SIGNIFICANT CHANGE UP (ref 3.5–5.3)
PROT SERPL-MCNC: 5.8 G/DL — LOW (ref 6–8.3)
RBC # BLD: 4.15 M/UL — SIGNIFICANT CHANGE UP (ref 3.8–5.2)
RBC # FLD: 15.2 % — HIGH (ref 10.3–14.5)
SARS-COV-2 RNA SPEC QL NAA+PROBE: SIGNIFICANT CHANGE UP
SODIUM SERPL-SCNC: 142 MMOL/L — SIGNIFICANT CHANGE UP (ref 135–145)
WBC # BLD: 6.58 K/UL — SIGNIFICANT CHANGE UP (ref 3.8–10.5)
WBC # FLD AUTO: 6.58 K/UL — SIGNIFICANT CHANGE UP (ref 3.8–10.5)

## 2022-01-02 RX ORDER — SODIUM CHLORIDE 9 MG/ML
1000 INJECTION, SOLUTION INTRAVENOUS
Refills: 0 | Status: DISCONTINUED | OUTPATIENT
Start: 2022-01-02 | End: 2022-01-03

## 2022-01-02 RX ADMIN — CEFEPIME 100 MILLIGRAM(S): 1 INJECTION, POWDER, FOR SOLUTION INTRAMUSCULAR; INTRAVENOUS at 04:52

## 2022-01-02 RX ADMIN — Medication 2.5 MILLIGRAM(S): at 05:23

## 2022-01-02 RX ADMIN — HEPARIN SODIUM 5000 UNIT(S): 5000 INJECTION INTRAVENOUS; SUBCUTANEOUS at 21:22

## 2022-01-02 RX ADMIN — Medication 2.5 MILLIGRAM(S): at 00:57

## 2022-01-02 RX ADMIN — HEPARIN SODIUM 5000 UNIT(S): 5000 INJECTION INTRAVENOUS; SUBCUTANEOUS at 05:23

## 2022-01-02 RX ADMIN — SODIUM CHLORIDE 50 MILLILITER(S): 9 INJECTION, SOLUTION INTRAVENOUS at 04:53

## 2022-01-02 RX ADMIN — HEPARIN SODIUM 5000 UNIT(S): 5000 INJECTION INTRAVENOUS; SUBCUTANEOUS at 16:39

## 2022-01-02 RX ADMIN — PANTOPRAZOLE SODIUM 40 MILLIGRAM(S): 20 TABLET, DELAYED RELEASE ORAL at 21:04

## 2022-01-02 RX ADMIN — SODIUM CHLORIDE 50 MILLILITER(S): 9 INJECTION, SOLUTION INTRAVENOUS at 16:40

## 2022-01-02 RX ADMIN — Medication 2.5 MILLIGRAM(S): at 12:10

## 2022-01-02 RX ADMIN — PANTOPRAZOLE SODIUM 40 MILLIGRAM(S): 20 TABLET, DELAYED RELEASE ORAL at 00:53

## 2022-01-02 NOTE — PROGRESS NOTE ADULT - SUBJECTIVE AND OBJECTIVE BOX
CHIEF COMPLAINT:Patient is a 89y old  Female who presents with a chief complaint of Altered mental status.Pt appears comfortable.    	  REVIEW OF SYSTEMS:  	  [ x] Unable to obtain    PHYSICAL EXAM:  T(C): 36.4 (01-02-22 @ 08:12), Max: 36.4 (01-01-22 @ 15:42)  HR: 56 (01-02-22 @ 08:12) (56 - 70)  BP: 159/93 (01-02-22 @ 08:12) (118/59 - 159/93)  RR: 16 (01-02-22 @ 08:12) (16 - 18)  SpO2: 93% (01-02-22 @ 08:12) (93% - 98%)  Wt(kg): --  I&O's Summary    01 Jan 2022 07:01  -  02 Jan 2022 07:00  --------------------------------------------------------  IN: 480 mL / OUT: 550 mL / NET: -70 mL        Appearance: Normal	  HEENT:   Normal oral mucosa, PERRL, EOMI	  Lymphatic: No lymphadenopathy  Cardiovascular: Normal S1 S2, No JVD, No murmurs, No edema  Respiratory: Lungs clear to auscultation	  Gastrointestinal:  Soft, Non-tender, + BS	  Skin: No rashes, No ecchymoses, No cyanosis	  Extremities: Normal range of motion, No clubbing, cyanosis or edema  Vascular: Peripheral pulses palpable 2+ bilaterally    MEDICATIONS  (STANDING):  cefepime   IVPB      cefepime   IVPB 500 milliGRAM(s) IV Intermittent every 24 hours  dextrose 5% + sodium chloride 0.45%. 1000 milliLiter(s) (50 mL/Hr) IV Continuous <Continuous>  heparin   Injectable 5000 Unit(s) SubCutaneous every 8 hours  metoprolol tartrate Injectable 2.5 milliGRAM(s) IV Push every 6 hours  pantoprazole  Injectable 40 milliGRAM(s) IV Push at bedtime  sodium bicarbonate 650 milliGRAM(s) Oral three times a day       	  	  LABS:	 	                      12.7   6.58  )-----------( 194      ( 02 Jan 2022 08:40 )             39.7     01-02    142  |  111<H>  |  9   ----------------------------<  68<L>  3.5   |  23  |  0.58    Ca    8.3<L>      02 Jan 2022 08:40  Phos  2.4     01-02  Mg     1.8     01-02    TPro  5.8<L>  /  Alb  1.9<L>  /  TBili  1.1  /  DBili  x   /  AST  17  /  ALT  16  /  AlkPhos  112  01-02

## 2022-01-02 NOTE — PROGRESS NOTE ADULT - PROBLEM SELECTOR PLAN 4
- Could be poor oral intake; Prerenal?  - FeNa 0.5%  - SCr 0.7 (significantly improved)  - c/w IVF - Could be poor oral intake; Prerenal?  - FeNa 0.5%  - SCr 0.58 (significantly improved)  - c/w IVF

## 2022-01-02 NOTE — PROGRESS NOTE ADULT - SUBJECTIVE AND OBJECTIVE BOX
Patient is seen and examined at the bed side, is afebrile. She is tolerating Abx well.      REVIEW OF SYSTEMS: Unable to obtain due to mental status      ALLERGIES: sulfa drugs (Hives)      Vital Signs Last 24 Hrs  T(C): 36.5 (2022 19:30), Max: 36.5 (2022 19:30)  T(F): 97.7 (2022 19:30), Max: 97.7 (2022 19:30)  HR: 76 (2022 19:30) (56 - 76)  BP: 165/90 (2022 19:30) (99/73 - 165/90)  BP(mean): --  RR: 17 (2022 19:30) (16 - 18)  SpO2: 94% (2022 19:30) (93% - 97%)      PHYSICAL EXAM:  GENERAL: Not in distress   CHEST/LUNG:  Not using accessory muscles  HEART: s1 and s2 present  ABDOMEN:  Nontender and  Nondistended  EXTREMITIES: No pedal  edema  CNS: More alert      LABS:                          12.7   6.58  )-----------( 194      ( 2022 08:40 )             39.7                           11.8   5.78  )-----------( 165      ( 2022 08:32 )             37.1                         9.8    18.53 )-----------( 138      ( 28 Dec 2021 07:26 )             30.7         142  |  111<H>  |  9   ----------------------------<  68<L>  3.5   |  23  |  0.58    Ca    8.3<L>      2022 08:40  Phos  2.4     01-  Mg     1.8     -    TPro  5.8<L>  /  Alb  1.9<L>  /  TBili  1.1  /  DBili  x   /  AST  17  /  ALT  16  /  AlkPhos  112      149<H>  |  121<H>  |  52<H>  ----------------------------<  39<LL>  4.7   |  18<L>  |  2.01<H>    Ca    7.8<L>      28 Dec 2021 07:08  Phos  3.6       Mg     1.7         TPro  5.2<L>  /  Alb  1.7<L>  /  TBili  0.8  /  DBili  x   /  AST  29  /  ALT  33  /  AlkPhos  100  12-    PT/INR - ( 27 Dec 2021 04:52 )   PT: 12.5 sec;   INR: 1.05 ratio    PTT - ( 27 Dec 2021 04:52 )  PTT:29.6 sec        CAPILLARY BLOOD GLUCOSE  POCT Blood Glucose.: 80 mg/dL (27 Dec 2021 04:17)        Urinalysis Basic - ( 26 Dec 2021 20:36 )  Color: Red / Appearance: bloody / S.010 / pH: x  Gluc: x / Ketone: Negative  / Bili: Negative / Urobili: Negative   Blood: x / Protein: 100 / Nitrite: Positive   Leuk Esterase: Moderate / RBC: >50 /HPF / WBC >50 /HPF   Sq Epi: x / Non Sq Epi: Few /HPF / Bacteria: Moderate /HPF        MEDICATIONS  (STANDING    cefepime   IVPB      cefepime   IVPB 500 milliGRAM(s) IV Intermittent every 24 hours  dextrose 5% + sodium chloride 0.45%. 1000 milliLiter(s) (50 mL/Hr) IV Continuous <Continuous>  heparin   Injectable 5000 Unit(s) SubCutaneous every 8 hours  metoprolol tartrate Injectable 2.5 milliGRAM(s) IV Push every 6 hours  pantoprazole  Injectable 40 milliGRAM(s) IV Push at bedtime  sodium bicarbonate 650 milliGRAM(s) Oral three times a day      RADIOLOGY & ADDITIONAL TESTS:    21: CT Abdomen and Pelvis No Cont (21 @ 20:03) Significantly limited exam due to motion artifact and noncontrast technique.    2.9 cm stone and 1.3 cm stone at the level of the left renal pelvis  possibly extending into the UPJ, difficult to evaluate. At least mild   left upper pole hydronephrosis and indeterminate lower pole hydronephrosis secondary to possible parapelvic cyst in this location.   Mild left perinephric stranding. Recommend correlation with urinalysis.    21: Xray Chest 1 View-PORTABLE IMMEDIATE (Xray Chest 1 View-PORTABLE IMMEDIATE .) (21 @ 20:52) Right perihilar opacity, differential diagnosis includes focal  consolidation and aortic aneurysm      MICROBIOLOGY DATA:  Culture - Blood (21 @ 13:34)   Specimen Source: .Blood Blood   Culture Results: No growth to date.     Culture - Blood (21 @ 13:34)   Specimen Source: .Blood Blood   Culture Results: No growth to date.     MRSA/MSSA PCR (21 @ 10:45)   MRSA PCR Result.: NotDetec:    Culture - Urine (21 @ 03:36)   - Trimethoprim/Sulfamethoxazole: S <=0.5/9.5   - Cefepime: S <=2   - Cefoxitin: S <=8   - Ceftriaxone: S <=1 Enterobacter, Klebsiella aerogenes, Citrobacter, and Serratia may develop resistance during prolonged therapy   - Ciprofloxacin: S <=0.25   - Ertapenem: S <=0.5   - Gentamicin: S <=2   - Levofloxacin: S <=0.5   - Meropenem: S <=1   - Nitrofurantoin: R >64 Should not be used to treat pyelonephritis   - Piperacillin/Tazobactam: S <=8   - Tigecycline: R <=2   - Tobramycin: S 4   - Amikacin: S <=16   - Amoxicillin/Clavulanic Acid: S <=8/4   - Ampicillin: R >16 These ampicillin results predict results for amoxicillin   - Ampicillin/Sulbactam: S <=4/2 Enterobacter, Klebsiella aerogenes, Citrobacter, and Serratia may develop resistance during prolonged therapy (3-4 days)   - Aztreonam: S <=4   - Cefazolin: R >16   Specimen Source: Clean Catch Clean Catch (Midstream)   Culture Results:   >100,000 CFU/ml Proteus vulgaris group   Organism Identification: Proteus vulgaris group   Culture - Blood (21 @ 12:43)   Specimen Source: .Blood Blood-Peripheral   Culture Results: No growth to date.     Culture - Blood (21 @ 12:43)   Specimen Source: .Blood Blood-Peripheral   Culture Results: No growth to date.     Culture - Urine (21 @ 03:36)   Specimen Source: Clean Catch Clean Catch (Midstream)   Culture Results: >100,000 CFU/ml Proteus vulgaris group     COVID-19 PCR . (12.26.21 @ 20:36)   COVID-19 PCR: NotDetec:

## 2022-01-02 NOTE — PROGRESS NOTE ADULT - SUBJECTIVE AND OBJECTIVE BOX
PGY-1 Progress Note discussed with attending    PAGER #: [[phone]] TILL 5:00 PM  PLEASE CONTACT ON CALL TEAM:  - On Call Team (Please refer to [facility]) FROM 5:00 PM - 8:30PM  - Nightfloat Team FROM 8:30 -7:30 AM      INTERVAL HPI/OVERNIGHT EVENTS:  No overnight events. Pt seen and examined by bedside, a little bit more alert today. However, cannot assess for ROS.      PHYSICAL EXAMINATION:  GENERAL: NAD, thin  HEAD:  Atraumatic, Normocephalic  EYES:  conjunctiva and sclera clear  NECK: Supple, No JVD  CHEST/LUNG: Clear to auscultation. Clear to percussion bilaterally; No rales, rhonchi, wheezing, or rubs  HEART: Regular rate and rhythm; No murmurs, rubs, or gallops  ABDOMEN: Soft, Nontender, Nondistended; Bowel sounds present  NERVOUS SYSTEM:  Alert & Oriented X0,    EXTREMITIES:  2+ Peripheral Pulses, No clubbing, cyanosis, or edema  SKIN: warm dry                     12.2   7.29  )-----------( 94       ( 31 Dec 2021 07:32 )             38.4     12-31    145  |  117<H>  |  17  ----------------------------<  81  4.1   |  21<L>  |  0.74    Ca    8.5      31 Dec 2021 07:32  Phos  2.6     12-31  Mg     1.9     12-31    TPro  5.6<L>  /  Alb  2.0<L>  /  TBili  0.9  /  DBili  x   /  AST  17  /  ALT  19  /  AlkPhos  117  12-31    LIVER FUNCTIONS - ( 31 Dec 2021 07:32 )  Alb: 2.0 g/dL / Pro: 5.6 g/dL / ALK PHOS: 117 U/L / ALT: 19 U/L DA / AST: 17 U/L / GGT: x                   CAPILLARY BLOOD GLUCOSE      RADIOLOGY & ADDITIONAL TESTS:                   PGY-1 Progress Note discussed with attending    PAGER #: [[phone]] TILL 5:00 PM  PLEASE CONTACT ON CALL TEAM:  - On Call Team (Please refer to [facility]) FROM 5:00 PM - 8:30PM  - Nightfloat Team FROM 8:30 -7:30 AM      INTERVAL HPI/OVERNIGHT EVENTS:  No overnight events. Pt seen and examined by bedside, alert today. Answers to simple questions.  However, cannot assess for ROS.      PHYSICAL EXAMINATION:  GENERAL: NAD, thin  HEAD:  Atraumatic, Normocephalic  EYES:  conjunctiva and sclera clear  NECK: Supple, No JVD  CHEST/LUNG: Clear to auscultation. Clear to percussion bilaterally; No rales, rhonchi, wheezing, or rubs  HEART: Regular rate and rhythm; No murmurs, rubs, or gallops  ABDOMEN: Soft, Nontender, Nondistended; Bowel sounds present  NERVOUS SYSTEM:  Alert & Oriented X0,    EXTREMITIES:  2+ Peripheral Pulses, No clubbing, cyanosis, or edema  SKIN: warm dry                     12.2   7.29  )-----------( 94       ( 31 Dec 2021 07:32 )             38.4     12-31    145  |  117<H>  |  17  ----------------------------<  81  4.1   |  21<L>  |  0.74    Ca    8.5      31 Dec 2021 07:32  Phos  2.6     12-31  Mg     1.9     12-31    TPro  5.6<L>  /  Alb  2.0<L>  /  TBili  0.9  /  DBili  x   /  AST  17  /  ALT  19  /  AlkPhos  117  12-31    LIVER FUNCTIONS - ( 31 Dec 2021 07:32 )  Alb: 2.0 g/dL / Pro: 5.6 g/dL / ALK PHOS: 117 U/L / ALT: 19 U/L DA / AST: 17 U/L / GGT: x                   CAPILLARY BLOOD GLUCOSE      RADIOLOGY & ADDITIONAL TESTS:

## 2022-01-02 NOTE — PROGRESS NOTE ADULT - PROBLEM SELECTOR PLAN 7
As per palliative care team note pt will be going on home hospice  SW referral made for home hospice.  On DC will need to go on nitrofurantoin for UTI prophylaxis  given pt has renal stones. As per palliative care team note pt will be going on home hospice  SW referral made for home hospice.  On DC will need to go on Hyprex for UTI prophylaxis  given pt has renal stones.

## 2022-01-02 NOTE — PROGRESS NOTE ADULT - PROBLEM SELECTOR PLAN 1
- Patient with mild dementia at baseline presented with AMS. As per conversation with daughter today pt MS waxes and wanes  - UA is positive   - previously on Rocephin, now on cefepime  - urine culture growing proteous (S) to cefepime  - BCX NGTD (initial)  - Repeat BCX neg  -c/w Cefepime (D5)  - Fall precautions   - Aspiration precautions  - palliative Dr. Davidson consulted-New MOLST drafted; DNR/DNI/no feeding tube/trial of IVF/comfort measures only/DNH. Pt to go home on home hospice. - Patient with mild dementia at baseline presented with AMS. As per conversation with daughter today pt MS waxes and wanes  - UA is positive   - previously on Rocephin, now on cefepime  - urine culture growing proteous (S) to cefepime  - BCX NGTD (initial)  - Repeat BCX neg  -c/w Cefepime (D7)  - Fall precautions   - Aspiration precautions  - Pt to continue on ABX until 1/6/22 as per ID  -When ready for DC pt will be on Augmentin 875mg daily plus Hiprex as prophylaxis against recurring UTI ·   - palliative Dr. Davidson consulted-New MOLST drafted; DNR/DNI/no feeding tube/trial of IVF/comfort measures only/DNH. Pt to go home on home hospice.

## 2022-01-03 LAB
CULTURE RESULTS: SIGNIFICANT CHANGE UP
CULTURE RESULTS: SIGNIFICANT CHANGE UP
GLUCOSE BLDC GLUCOMTR-MCNC: 102 MG/DL — HIGH (ref 70–99)
GLUCOSE BLDC GLUCOMTR-MCNC: 104 MG/DL — HIGH (ref 70–99)
GLUCOSE BLDC GLUCOMTR-MCNC: 81 MG/DL — SIGNIFICANT CHANGE UP (ref 70–99)
GLUCOSE BLDC GLUCOMTR-MCNC: 95 MG/DL — SIGNIFICANT CHANGE UP (ref 70–99)
SPECIMEN SOURCE: SIGNIFICANT CHANGE UP
SPECIMEN SOURCE: SIGNIFICANT CHANGE UP

## 2022-01-03 RX ORDER — SODIUM CHLORIDE 9 MG/ML
1000 INJECTION, SOLUTION INTRAVENOUS
Refills: 0 | Status: DISCONTINUED | OUTPATIENT
Start: 2022-01-03 | End: 2022-01-05

## 2022-01-03 RX ADMIN — Medication 2.5 MILLIGRAM(S): at 00:10

## 2022-01-03 RX ADMIN — Medication 2.5 MILLIGRAM(S): at 05:36

## 2022-01-03 RX ADMIN — HEPARIN SODIUM 5000 UNIT(S): 5000 INJECTION INTRAVENOUS; SUBCUTANEOUS at 21:43

## 2022-01-03 RX ADMIN — CEFEPIME 100 MILLIGRAM(S): 1 INJECTION, POWDER, FOR SOLUTION INTRAMUSCULAR; INTRAVENOUS at 04:52

## 2022-01-03 RX ADMIN — Medication 650 MILLIGRAM(S): at 21:43

## 2022-01-03 RX ADMIN — Medication 1 PATCH: at 20:29

## 2022-01-03 RX ADMIN — PANTOPRAZOLE SODIUM 40 MILLIGRAM(S): 20 TABLET, DELAYED RELEASE ORAL at 21:43

## 2022-01-03 RX ADMIN — Medication 650 MILLIGRAM(S): at 14:25

## 2022-01-03 RX ADMIN — Medication 1 PATCH: at 09:44

## 2022-01-03 NOTE — PROGRESS NOTE ADULT - SUBJECTIVE AND OBJECTIVE BOX
CHIEF COMPLAINT:Patient is a 89y old  Female who presents with a chief complaint of Altered mental status.Pt appears comfortable.    	  REVIEW OF SYSTEMS:    [x ] Unable to obtain    PHYSICAL EXAM:  T(C): 36.2 (01-03-22 @ 08:05), Max: 36.5 (01-02-22 @ 19:30)  HR: 72 (01-03-22 @ 08:05) (60 - 76)  BP: 171/54 (01-03-22 @ 08:05) (99/73 - 185/86)  RR: 17 (01-03-22 @ 08:05) (17 - 18)  SpO2: 80% (01-03-22 @ 08:05) (80% - 96%)  Wt(kg): --  I&O's Summary    02 Jan 2022 07:01  -  03 Jan 2022 07:00  --------------------------------------------------------  IN: 500 mL / OUT: 0 mL / NET: 500 mL        Appearance: Normal	  HEENT:   Normal oral mucosa, PERRL, EOMI	  Lymphatic: No lymphadenopathy  Cardiovascular: Normal S1 S2, No JVD, No murmurs, No edema  Respiratory: Lungs clear to auscultation	  Gastrointestinal:  Soft, Non-tender, + BS	  Skin: No rashes, No ecchymoses, No cyanosis	  Extremities: Normal range of motion, No clubbing, cyanosis or edema  Vascular: Peripheral pulses palpable 2+ bilaterally    MEDICATIONS  (STANDING):  cefepime   IVPB      cefepime   IVPB 500 milliGRAM(s) IV Intermittent every 24 hours  cloNIDine Patch 0.1 mG/24Hr(s) 1 patch Transdermal every 7 days  dextrose 5% + sodium chloride 0.45%. 1000 milliLiter(s) (50 mL/Hr) IV Continuous <Continuous>  heparin   Injectable 5000 Unit(s) SubCutaneous every 8 hours  pantoprazole  Injectable 40 milliGRAM(s) IV Push at bedtime  sodium bicarbonate 650 milliGRAM(s) Oral three times a day        LABS:	 	                     12.7   6.58  )-----------( 194      ( 02 Jan 2022 08:40 )             39.7     01-02    142  |  111<H>  |  9   ----------------------------<  68<L>  3.5   |  23  |  0.58    Ca    8.3<L>      02 Jan 2022 08:40  Phos  2.4     01-02  Mg     1.8     01-02    TPro  5.8<L>  /  Alb  1.9<L>  /  TBili  1.1  /  DBili  x   /  AST  17  /  ALT  16  /  AlkPhos  112  01-02

## 2022-01-03 NOTE — PROGRESS NOTE ADULT - PROBLEM SELECTOR PLAN 4
- Could be poor oral intake; Prerenal?  - FeNa 0.5%  - SCr 0.58 (significantly improved) -1/2/22  - c/w IVF-D5 30 cc/hr as pt has poor oral intake

## 2022-01-03 NOTE — PROGRESS NOTE ADULT - SUBJECTIVE AND OBJECTIVE BOX
Patient is seen and examined at the bed side, is afebrile. No new events, the discharge plan noted.      REVIEW OF SYSTEMS: Unable to obtain due to mental status      ALLERGIES: sulfa drugs (Hives)      Vital Signs Last 24 Hrs  T(C): 36.4 (2022 19:26), Max: 36.6 (2022 12:04)  T(F): 97.5 (2022 19:26), Max: 97.9 (2022 12:04)  HR: 62 (2022 19:26) (62 - 82)  BP: 110/64 (2022 19:26) (110/64 - 185/86)  BP(mean): --  RR: 17 (:26) (17 - 17)  SpO2: 97% (2022 19:26) (80% - 97%)      PHYSICAL EXAM:  GENERAL: Not in distress   CHEST/LUNG:  Not using accessory muscles  HEART: s1 and s2 present  ABDOMEN:  Nontender and  Nondistended  EXTREMITIES: No pedal  edema  CNS: More alert      LABS:  No new Labs                        12.7   6.58  )-----------( 194      ( 2022 08:40 )             39.7                         11.8   5.78  )-----------( 165      ( 2022 08:32 )             37.1                         9.8    18.53 )-----------( 138      ( 28 Dec 2021 07:26 )             30.7     01    142  |  111<H>  |  9   ----------------------------<  68<L>  3.5   |  23  |  0.58    Ca    8.3<L>      2022 08:40  Phos  2.4     -  Mg     1.8     -    TPro  5.8<L>  /  Alb  1.9<L>  /  TBili  1.1  /  DBili  x   /  AST  17  /  ALT  16  /  AlkPhos  112      149<H>  |  121<H>  |  52<H>  ----------------------------<  39<LL>  4.7   |  18<L>  |  2.01<H>    Ca    7.8<L>      28 Dec 2021 07:08  Phos  3.6       Mg     1.7         TPro  5.2<L>  /  Alb  1.7<L>  /  TBili  0.8  /  DBili  x   /  AST  29  /  ALT  33  /  AlkPhos  100      PT/INR - ( 27 Dec 2021 04:52 )   PT: 12.5 sec;   INR: 1.05 ratio    PTT - ( 27 Dec 2021 04:52 )  PTT:29.6 sec        CAPILLARY BLOOD GLUCOSE  POCT Blood Glucose.: 80 mg/dL (27 Dec 2021 04:17)        Urinalysis Basic - ( 26 Dec 2021 20:36 )  Color: Red / Appearance: bloody / S.010 / pH: x  Gluc: x / Ketone: Negative  / Bili: Negative / Urobili: Negative   Blood: x / Protein: 100 / Nitrite: Positive   Leuk Esterase: Moderate / RBC: >50 /HPF / WBC >50 /HPF   Sq Epi: x / Non Sq Epi: Few /HPF / Bacteria: Moderate /HPF        MEDICATIONS  (STANDING    cefepime   IVPB      cefepime   IVPB 500 milliGRAM(s) IV Intermittent every 24 hours  cloNIDine Patch 0.1 mG/24Hr(s) 1 patch Transdermal every 7 days  dextrose 5% + sodium chloride 0.45%. 1000 milliLiter(s) (50 mL/Hr) IV Continuous <Continuous>  heparin   Injectable 5000 Unit(s) SubCutaneous every 8 hours  pantoprazole  Injectable 40 milliGRAM(s) IV Push at bedtime  sodium bicarbonate 650 milliGRAM(s) Oral three times a day        RADIOLOGY & ADDITIONAL TESTS:    21: CT Abdomen and Pelvis No Cont (21 @ 20:03) Significantly limited exam due to motion artifact and noncontrast technique.    2.9 cm stone and 1.3 cm stone at the level of the left renal pelvis  possibly extending into the UPJ, difficult to evaluate. At least mild   left upper pole hydronephrosis and indeterminate lower pole hydronephrosis secondary to possible parapelvic cyst in this location.   Mild left perinephric stranding. Recommend correlation with urinalysis.    21: Xray Chest 1 View-PORTABLE IMMEDIATE (Xray Chest 1 View-PORTABLE IMMEDIATE .) (12.26.21 @ 20:52) Right perihilar opacity, differential diagnosis includes focal  consolidation and aortic aneurysm      MICROBIOLOGY DATA:  Culture - Blood (21 @ 13:34)   Specimen Source: .Blood Blood   Culture Results: No growth to date.     Culture - Blood (21 @ 13:34)   Specimen Source: .Blood Blood   Culture Results: No growth to date.     MRSA/MSSA PCR (21 @ 10:45)   MRSA PCR Result.: NotDetec:    Culture - Urine (21 @ 03:36)   - Trimethoprim/Sulfamethoxazole: S <=0.5/9.5   - Cefepime: S <=2   - Cefoxitin: S <=8   - Ceftriaxone: S <=1 Enterobacter, Klebsiella aerogenes, Citrobacter, and Serratia may develop resistance during prolonged therapy   - Ciprofloxacin: S <=0.25   - Ertapenem: S <=0.5   - Gentamicin: S <=2   - Levofloxacin: S <=0.5   - Meropenem: S <=1   - Nitrofurantoin: R >64 Should not be used to treat pyelonephritis   - Piperacillin/Tazobactam: S <=8   - Tigecycline: R <=2   - Tobramycin: S 4   - Amikacin: S <=16   - Amoxicillin/Clavulanic Acid: S <=8/4   - Ampicillin: R >16 These ampicillin results predict results for amoxicillin   - Ampicillin/Sulbactam: S <=4/2 Enterobacter, Klebsiella aerogenes, Citrobacter, and Serratia may develop resistance during prolonged therapy (3-4 days)   - Aztreonam: S <=4   - Cefazolin: R >16   Specimen Source: Clean Catch Clean Catch (Midstream)   Culture Results:   >100,000 CFU/ml Proteus vulgaris group   Organism Identification: Proteus vulgaris group   Culture - Blood (21 @ 12:43)   Specimen Source: .Blood Blood-Peripheral   Culture Results: No growth to date.     Culture - Blood (21 @ 12:43)   Specimen Source: .Blood Blood-Peripheral   Culture Results: No growth to date.     Culture - Urine (21 @ 03:36)   Specimen Source: Clean Catch Clean Catch (Midstream)   Culture Results: >100,000 CFU/ml Proteus vulgaris group     COVID-19 PCR . (12.26.21 @ 20:36)   COVID-19 PCR: NotDetec:                           Assessment and Plan:   · Assessment

## 2022-01-03 NOTE — PROGRESS NOTE ADULT - PROBLEM SELECTOR PLAN 1
- Patient with mild dementia at baseline presented with AMS. As per conversation with daughter today pt MS waxes and wanes  - UA is positive   - previously on Rocephin, now on cefepime  - urine culture growing proteous (S) to cefepime  - BCX NGTD (initial)  - Repeat BCX neg  -c/w Cefepime (D8)  - Fall precautions   - Aspiration precautions  - Pt to continue on ABX until 1/6/22 as per ID  -When ready for DC pt will be on Augmentin 875mg daily plus Hiprex as prophylaxis against recurring UTI ·   - palliative Dr. Davidson consulted-New MOLST drafted; DNR/DNI/no feeding tube/trial of IVF/comfort measures only/DNH. Pt to go home on home hospice.

## 2022-01-03 NOTE — PROGRESS NOTE ADULT - PROBLEM SELECTOR PLAN 7
As per palliative care team note pt will be going on home hospice  SW referral made for home hospice.  On DC will need to go on Hyprex for UTI prophylaxis  given pt has renal stones and Augmentin 875 PO BID until 1/6/22

## 2022-01-03 NOTE — PROGRESS NOTE ADULT - SUBJECTIVE AND OBJECTIVE BOX
PGY-1 Progress Note discussed with attending    PAGER #: [[phone]] TILL 5:00 PM  PLEASE CONTACT ON CALL TEAM:  - On Call Team (Please refer to [facility]) FROM 5:00 PM - 8:30PM  - Nightfloat Team FROM 8:30 -7:30 AM    CHIEF COMPLAINT & BRIEF HOSPITAL COURSE:    INTERVAL HPI/OVERNIGHT EVENTS:       REVIEW OF SYSTEMS:  CONSTITUTIONAL: No fever, weight loss, or fatigue  RESPIRATORY: No cough, wheezing, chills or hemoptysis; No shortness of breath  CARDIOVASCULAR: No chest pain, palpitations, dizziness, or leg swelling  GASTROINTESTINAL: No abdominal pain. No nausea, vomiting, or hematemesis; No diarrhea or constipation. No melena or hematochezia.  GENITOURINARY: No dysuria or hematuria, urinary frequency  NEUROLOGICAL: No headaches, memory loss, loss of strength, numbness, or tremors  SKIN: No itching, burning, rashes, or lesions     Vital Signs Last 24 Hrs  T(C): 36.2 (03 Jan 2022 08:05), Max: 36.5 (02 Jan 2022 19:30)  T(F): 97.1 (03 Jan 2022 08:05), Max: 97.7 (02 Jan 2022 19:30)  HR: 72 (03 Jan 2022 08:05) (66 - 76)  BP: 171/54 (03 Jan 2022 08:05) (99/73 - 185/86)  BP(mean): --  RR: 17 (03 Jan 2022 08:05) (17 - 17)  SpO2: 80% (03 Jan 2022 08:05) (80% - 96%)    PHYSICAL EXAMINATION:  GENERAL: NAD, well built  HEAD:  Atraumatic, Normocephalic  EYES:  conjunctiva and sclera clear  NECK: Supple, No JVD, Normal thyroid  CHEST/LUNG: Clear to auscultation. Clear to percussion bilaterally; No rales, rhonchi, wheezing, or rubs  HEART: Regular rate and rhythm; No murmurs, rubs, or gallops  ABDOMEN: Soft, Nontender, Nondistended; Bowel sounds present  NERVOUS SYSTEM:  Alert & Oriented X3,    EXTREMITIES:  2+ Peripheral Pulses, No clubbing, cyanosis, or edema  SKIN: warm dry                          12.7   6.58  )-----------( 194      ( 02 Jan 2022 08:40 )             39.7     01-02    142  |  111<H>  |  9   ----------------------------<  68<L>  3.5   |  23  |  0.58    Ca    8.3<L>      02 Jan 2022 08:40  Phos  2.4     01-02  Mg     1.8     01-02    TPro  5.8<L>  /  Alb  1.9<L>  /  TBili  1.1  /  DBili  x   /  AST  17  /  ALT  16  /  AlkPhos  112  01-02    LIVER FUNCTIONS - ( 02 Jan 2022 08:40 )  Alb: 1.9 g/dL / Pro: 5.8 g/dL / ALK PHOS: 112 U/L / ALT: 16 U/L DA / AST: 17 U/L / GGT: x                   CAPILLARY BLOOD GLUCOSE      RADIOLOGY & ADDITIONAL TESTS:                   PGY-1 Progress Note discussed with attending    PAGER #: [[phone]] TILL 5:00 PM  PLEASE CONTACT ON CALL TEAM:  - On Call Team (Please refer to [facility]) FROM 5:00 PM - 8:30PM  - Nightfloat Team FROM 8:30 -7:30 AM      INTERVAL HPI/OVERNIGHT EVENTS:  Pt refused heparin injection overnight. Seen and examined at bedside, alert but no oriented. Therefore, cannot assess for ROS.       Vital Signs Last 24 Hrs  T(C): 36.2 (03 Jan 2022 08:05), Max: 36.5 (02 Jan 2022 19:30)  T(F): 97.1 (03 Jan 2022 08:05), Max: 97.7 (02 Jan 2022 19:30)  HR: 72 (03 Jan 2022 08:05) (66 - 76)  BP: 171/54 (03 Jan 2022 08:05) (99/73 - 185/86)  BP(mean): --  RR: 17 (03 Jan 2022 08:05) (17 - 17)  SpO2: 80% (03 Jan 2022 08:05) (80% - 96%)      PHYSICAL EXAMINATION:  GENERAL: NAD, thin  HEAD:  Atraumatic, Normocephalic  EYES:  conjunctiva and sclera clear  NECK: Supple, No JVD  CHEST/LUNG: Clear to auscultation. Clear to percussion bilaterally; No rales, rhonchi, wheezing, or rubs  HEART: Regular rate and rhythm; No murmurs, rubs, or gallops  ABDOMEN: Soft, Nontender, Nondistended; Bowel sounds present  NERVOUS SYSTEM:  Alert & Oriented X0,    EXTREMITIES:  2+ Peripheral Pulses, No clubbing, cyanosis, or edema  SKIN: warm dry                             12.7   6.58  )-----------( 194      ( 02 Jan 2022 08:40 )             39.7     01-02    142  |  111<H>  |  9   ----------------------------<  68<L>  3.5   |  23  |  0.58    Ca    8.3<L>      02 Jan 2022 08:40  Phos  2.4     01-02  Mg     1.8     01-02    TPro  5.8<L>  /  Alb  1.9<L>  /  TBili  1.1  /  DBili  x   /  AST  17  /  ALT  16  /  AlkPhos  112  01-02    LIVER FUNCTIONS - ( 02 Jan 2022 08:40 )  Alb: 1.9 g/dL / Pro: 5.8 g/dL / ALK PHOS: 112 U/L / ALT: 16 U/L DA / AST: 17 U/L / GGT: x                   CAPILLARY BLOOD GLUCOSE      RADIOLOGY & ADDITIONAL TESTS:

## 2022-01-04 ENCOUNTER — TRANSCRIPTION ENCOUNTER (OUTPATIENT)
Age: 87
End: 2022-01-04

## 2022-01-04 DIAGNOSIS — I10 ESSENTIAL (PRIMARY) HYPERTENSION: ICD-10-CM

## 2022-01-04 LAB
ALBUMIN SERPL ELPH-MCNC: 1.8 G/DL — LOW (ref 3.5–5)
ALP SERPL-CCNC: 94 U/L — SIGNIFICANT CHANGE UP (ref 40–120)
ALT FLD-CCNC: 13 U/L DA — SIGNIFICANT CHANGE UP (ref 10–60)
ANION GAP SERPL CALC-SCNC: 8 MMOL/L — SIGNIFICANT CHANGE UP (ref 5–17)
AST SERPL-CCNC: 11 U/L — SIGNIFICANT CHANGE UP (ref 10–40)
BILIRUB SERPL-MCNC: 1.2 MG/DL — SIGNIFICANT CHANGE UP (ref 0.2–1.2)
BUN SERPL-MCNC: 6 MG/DL — LOW (ref 7–18)
CALCIUM SERPL-MCNC: 8.1 MG/DL — LOW (ref 8.4–10.5)
CHLORIDE SERPL-SCNC: 107 MMOL/L — SIGNIFICANT CHANGE UP (ref 96–108)
CO2 SERPL-SCNC: 27 MMOL/L — SIGNIFICANT CHANGE UP (ref 22–31)
CREAT SERPL-MCNC: 0.56 MG/DL — SIGNIFICANT CHANGE UP (ref 0.5–1.3)
GLUCOSE BLDC GLUCOMTR-MCNC: 101 MG/DL — HIGH (ref 70–99)
GLUCOSE BLDC GLUCOMTR-MCNC: 76 MG/DL — SIGNIFICANT CHANGE UP (ref 70–99)
GLUCOSE BLDC GLUCOMTR-MCNC: 82 MG/DL — SIGNIFICANT CHANGE UP (ref 70–99)
GLUCOSE BLDC GLUCOMTR-MCNC: 88 MG/DL — SIGNIFICANT CHANGE UP (ref 70–99)
GLUCOSE SERPL-MCNC: 92 MG/DL — SIGNIFICANT CHANGE UP (ref 70–99)
HCT VFR BLD CALC: 36.9 % — SIGNIFICANT CHANGE UP (ref 34.5–45)
HGB BLD-MCNC: 12 G/DL — SIGNIFICANT CHANGE UP (ref 11.5–15.5)
MAGNESIUM SERPL-MCNC: 1.7 MG/DL — SIGNIFICANT CHANGE UP (ref 1.6–2.6)
MCHC RBC-ENTMCNC: 30.5 PG — SIGNIFICANT CHANGE UP (ref 27–34)
MCHC RBC-ENTMCNC: 32.5 GM/DL — SIGNIFICANT CHANGE UP (ref 32–36)
MCV RBC AUTO: 93.7 FL — SIGNIFICANT CHANGE UP (ref 80–100)
NRBC # BLD: 0 /100 WBCS — SIGNIFICANT CHANGE UP (ref 0–0)
PHOSPHATE SERPL-MCNC: 2.4 MG/DL — LOW (ref 2.5–4.5)
PLATELET # BLD AUTO: 220 K/UL — SIGNIFICANT CHANGE UP (ref 150–400)
POTASSIUM SERPL-MCNC: 2.7 MMOL/L — CRITICAL LOW (ref 3.5–5.3)
POTASSIUM SERPL-SCNC: 2.7 MMOL/L — CRITICAL LOW (ref 3.5–5.3)
PROT SERPL-MCNC: 5.2 G/DL — LOW (ref 6–8.3)
RBC # BLD: 3.94 M/UL — SIGNIFICANT CHANGE UP (ref 3.8–5.2)
RBC # FLD: 15 % — HIGH (ref 10.3–14.5)
SODIUM SERPL-SCNC: 142 MMOL/L — SIGNIFICANT CHANGE UP (ref 135–145)
WBC # BLD: 7.5 K/UL — SIGNIFICANT CHANGE UP (ref 3.8–10.5)
WBC # FLD AUTO: 7.5 K/UL — SIGNIFICANT CHANGE UP (ref 3.8–10.5)

## 2022-01-04 PROCEDURE — 83605 ASSAY OF LACTIC ACID: CPT

## 2022-01-04 PROCEDURE — 83935 ASSAY OF URINE OSMOLALITY: CPT

## 2022-01-04 PROCEDURE — 84484 ASSAY OF TROPONIN QUANT: CPT

## 2022-01-04 PROCEDURE — 82962 GLUCOSE BLOOD TEST: CPT

## 2022-01-04 PROCEDURE — 82570 ASSAY OF URINE CREATININE: CPT

## 2022-01-04 PROCEDURE — 85730 THROMBOPLASTIN TIME PARTIAL: CPT

## 2022-01-04 PROCEDURE — 80053 COMPREHEN METABOLIC PANEL: CPT

## 2022-01-04 PROCEDURE — 93005 ELECTROCARDIOGRAM TRACING: CPT

## 2022-01-04 PROCEDURE — 87641 MR-STAPH DNA AMP PROBE: CPT

## 2022-01-04 PROCEDURE — 85610 PROTHROMBIN TIME: CPT

## 2022-01-04 PROCEDURE — 84100 ASSAY OF PHOSPHORUS: CPT

## 2022-01-04 PROCEDURE — 70450 CT HEAD/BRAIN W/O DYE: CPT | Mod: MA

## 2022-01-04 PROCEDURE — 87635 SARS-COV-2 COVID-19 AMP PRB: CPT

## 2022-01-04 PROCEDURE — 99285 EMERGENCY DEPT VISIT HI MDM: CPT | Mod: 25

## 2022-01-04 PROCEDURE — 84300 ASSAY OF URINE SODIUM: CPT

## 2022-01-04 PROCEDURE — 87186 SC STD MICRODIL/AGAR DIL: CPT

## 2022-01-04 PROCEDURE — 87640 STAPH A DNA AMP PROBE: CPT

## 2022-01-04 PROCEDURE — 85027 COMPLETE CBC AUTOMATED: CPT

## 2022-01-04 PROCEDURE — 74176 CT ABD & PELVIS W/O CONTRAST: CPT | Mod: MA

## 2022-01-04 PROCEDURE — 84560 ASSAY OF URINE/URIC ACID: CPT

## 2022-01-04 PROCEDURE — 83735 ASSAY OF MAGNESIUM: CPT

## 2022-01-04 PROCEDURE — 87040 BLOOD CULTURE FOR BACTERIA: CPT

## 2022-01-04 PROCEDURE — 81001 URINALYSIS AUTO W/SCOPE: CPT

## 2022-01-04 PROCEDURE — 82550 ASSAY OF CK (CPK): CPT

## 2022-01-04 PROCEDURE — 87077 CULTURE AEROBIC IDENTIFY: CPT

## 2022-01-04 PROCEDURE — 92610 EVALUATE SWALLOWING FUNCTION: CPT

## 2022-01-04 PROCEDURE — 36415 COLL VENOUS BLD VENIPUNCTURE: CPT

## 2022-01-04 PROCEDURE — 85025 COMPLETE CBC W/AUTO DIFF WBC: CPT

## 2022-01-04 PROCEDURE — 87086 URINE CULTURE/COLONY COUNT: CPT

## 2022-01-04 PROCEDURE — 71045 X-RAY EXAM CHEST 1 VIEW: CPT

## 2022-01-04 PROCEDURE — 85379 FIBRIN DEGRADATION QUANT: CPT

## 2022-01-04 RX ORDER — POTASSIUM CHLORIDE 20 MEQ
10 PACKET (EA) ORAL
Refills: 0 | Status: COMPLETED | OUTPATIENT
Start: 2022-01-04 | End: 2022-01-04

## 2022-01-04 RX ORDER — PANTOPRAZOLE SODIUM 20 MG/1
1 TABLET, DELAYED RELEASE ORAL
Qty: 0 | Refills: 0 | DISCHARGE

## 2022-01-04 RX ORDER — SODIUM BICARBONATE 1 MEQ/ML
1 SYRINGE (ML) INTRAVENOUS
Qty: 90 | Refills: 0
Start: 2022-01-04 | End: 2022-02-02

## 2022-01-04 RX ORDER — CALCIUM CARBONATE 500(1250)
1 TABLET ORAL
Qty: 0 | Refills: 0 | DISCHARGE

## 2022-01-04 RX ORDER — METHENAMINE MANDELATE 1 G
1 TABLET ORAL
Qty: 60 | Refills: 0
Start: 2022-01-04 | End: 2022-02-02

## 2022-01-04 RX ORDER — PHENAZOPYRIDINE HCL 100 MG
2 TABLET ORAL
Qty: 0 | Refills: 0 | DISCHARGE

## 2022-01-04 RX ORDER — OMEPRAZOLE 10 MG/1
1 CAPSULE, DELAYED RELEASE ORAL
Qty: 0 | Refills: 0 | DISCHARGE

## 2022-01-04 RX ORDER — POTASSIUM PHOSPHATE, MONOBASIC POTASSIUM PHOSPHATE, DIBASIC 236; 224 MG/ML; MG/ML
15 INJECTION, SOLUTION INTRAVENOUS ONCE
Refills: 0 | Status: COMPLETED | OUTPATIENT
Start: 2022-01-04 | End: 2022-01-04

## 2022-01-04 RX ORDER — OMEPRAZOLE 10 MG/1
1 CAPSULE, DELAYED RELEASE ORAL
Qty: 30 | Refills: 0
Start: 2022-01-04 | End: 2022-02-02

## 2022-01-04 RX ADMIN — Medication 1 PATCH: at 07:59

## 2022-01-04 RX ADMIN — HEPARIN SODIUM 5000 UNIT(S): 5000 INJECTION INTRAVENOUS; SUBCUTANEOUS at 05:09

## 2022-01-04 RX ADMIN — POTASSIUM PHOSPHATE, MONOBASIC POTASSIUM PHOSPHATE, DIBASIC 62.5 MILLIMOLE(S): 236; 224 INJECTION, SOLUTION INTRAVENOUS at 17:19

## 2022-01-04 RX ADMIN — Medication 650 MILLIGRAM(S): at 05:09

## 2022-01-04 RX ADMIN — Medication 100 MILLIEQUIVALENT(S): at 16:03

## 2022-01-04 RX ADMIN — CEFEPIME 100 MILLIGRAM(S): 1 INJECTION, POWDER, FOR SOLUTION INTRAMUSCULAR; INTRAVENOUS at 04:26

## 2022-01-04 RX ADMIN — Medication 650 MILLIGRAM(S): at 21:28

## 2022-01-04 RX ADMIN — HEPARIN SODIUM 5000 UNIT(S): 5000 INJECTION INTRAVENOUS; SUBCUTANEOUS at 14:29

## 2022-01-04 RX ADMIN — Medication 100 MILLIEQUIVALENT(S): at 15:00

## 2022-01-04 RX ADMIN — PANTOPRAZOLE SODIUM 40 MILLIGRAM(S): 20 TABLET, DELAYED RELEASE ORAL at 21:29

## 2022-01-04 RX ADMIN — Medication 1 PATCH: at 20:16

## 2022-01-04 RX ADMIN — HEPARIN SODIUM 5000 UNIT(S): 5000 INJECTION INTRAVENOUS; SUBCUTANEOUS at 21:28

## 2022-01-04 NOTE — PROGRESS NOTE ADULT - SUBJECTIVE AND OBJECTIVE BOX
CHIEF COMPLAINT:Patient is a 89y old  Female who presents with a chief complaint of Altered mental status.Pt appears comfortable.    	  REVIEW OF SYSTEMS:  	  [x ] Unable to obtain    PHYSICAL EXAM:  T(C): 37.1 (01-04-22 @ 08:30), Max: 37.1 (01-04-22 @ 08:30)  HR: 56 (01-04-22 @ 08:30) (53 - 82)  BP: 128/78 (01-04-22 @ 08:30) (110/64 - 164/78)  RR: 18 (01-04-22 @ 08:30) (17 - 18)  SpO2: 97% (01-04-22 @ 08:30) (82% - 97%)  Wt(kg): --  I&O's Summary      Appearance: Normal	  HEENT:   Normal oral mucosa, PERRL, EOMI	  Lymphatic: No lymphadenopathy  Cardiovascular: Normal S1 S2, No JVD, No murmurs, No edema  Respiratory: Lungs clear to auscultation	  Gastrointestinal:  Soft, Non-tender, + BS	  Skin: No rashes, No ecchymoses, No cyanosis	  Extremities: Normal range of motion, No clubbing, cyanosis or edema  Vascular: Peripheral pulses palpable 2+ bilaterally    MEDICATIONS  (STANDING):  cefepime   IVPB 500 milliGRAM(s) IV Intermittent every 24 hours  cefepime   IVPB      cloNIDine Patch 0.1 mG/24Hr(s) 1 patch Transdermal every 7 days  dextrose 5% + sodium chloride 0.45%. 1000 milliLiter(s) (50 mL/Hr) IV Continuous <Continuous>  heparin   Injectable 5000 Unit(s) SubCutaneous every 8 hours  pantoprazole  Injectable 40 milliGRAM(s) IV Push at bedtime  sodium bicarbonate 650 milliGRAM(s) Oral three times a day      	  	  LABS:	 	    none new

## 2022-01-04 NOTE — PROGRESS NOTE ADULT - PROBLEM SELECTOR PROBLEM 3
Elevated troponin level

## 2022-01-04 NOTE — PROGRESS NOTE ADULT - PROBLEM SELECTOR PROBLEM 4
PATRICK (acute kidney injury)
Hypertension
PATRICK (acute kidney injury)

## 2022-01-04 NOTE — PROGRESS NOTE ADULT - SUBJECTIVE AND OBJECTIVE BOX
PGY-1 Progress Note discussed with attending    PAGER #: [[phone]] TILL 5:00 PM  PLEASE CONTACT ON CALL TEAM:  - On Call Team (Please refer to [facility]) FROM 5:00 PM - 8:30PM  - Nightfloat Team FROM 8:30 -7:30 AM    CHIEF COMPLAINT & BRIEF HOSPITAL COURSE:    INTERVAL HPI/OVERNIGHT EVENTS:       REVIEW OF SYSTEMS:  CONSTITUTIONAL: No fever, weight loss, or fatigue  RESPIRATORY: No cough, wheezing, chills or hemoptysis; No shortness of breath  CARDIOVASCULAR: No chest pain, palpitations, dizziness, or leg swelling  GASTROINTESTINAL: No abdominal pain. No nausea, vomiting, or hematemesis; No diarrhea or constipation. No melena or hematochezia.  GENITOURINARY: No dysuria or hematuria, urinary frequency  NEUROLOGICAL: No headaches, memory loss, loss of strength, numbness, or tremors  SKIN: No itching, burning, rashes, or lesions     Vital Signs Last 24 Hrs  T(C): 37.1 (04 Jan 2022 08:30), Max: 37.1 (04 Jan 2022 08:30)  T(F): 98.7 (04 Jan 2022 08:30), Max: 98.7 (04 Jan 2022 08:30)  HR: 56 (04 Jan 2022 08:30) (53 - 82)  BP: 128/78 (04 Jan 2022 08:30) (110/64 - 164/78)  BP(mean): --  RR: 18 (04 Jan 2022 08:30) (17 - 18)  SpO2: 97% (04 Jan 2022 08:30) (82% - 97%)    PHYSICAL EXAMINATION:  GENERAL: NAD, well built  HEAD:  Atraumatic, Normocephalic  EYES:  conjunctiva and sclera clear  NECK: Supple, No JVD, Normal thyroid  CHEST/LUNG: Clear to auscultation. Clear to percussion bilaterally; No rales, rhonchi, wheezing, or rubs  HEART: Regular rate and rhythm; No murmurs, rubs, or gallops  ABDOMEN: Soft, Nontender, Nondistended; Bowel sounds present  NERVOUS SYSTEM:  Alert & Oriented X3,    EXTREMITIES:  2+ Peripheral Pulses, No clubbing, cyanosis, or edema  SKIN: warm dry                      CAPILLARY BLOOD GLUCOSE      RADIOLOGY & ADDITIONAL TESTS:                   PGY-1 Progress Note discussed with attending    PAGER #: [[phone]] TILL 5:00 PM  PLEASE CONTACT ON CALL TEAM:  - On Call Team (Please refer to [facility]) FROM 5:00 PM - 8:30PM  - Nightfloat Team FROM 8:30 -7:30 AM        INTERVAL HPI/OVERNIGHT EVENTS:  No overnight events overnight. Pt seen and examined at bedside, alert, following some commands. Given MS cannot fully assess for ROS      REVIEW OF SYSTEMS:  CONSTITUTIONAL: No fever, weight loss, or fatigue  RESPIRATORY: No cough, wheezing, chills or hemoptysis; No shortness of breath  CARDIOVASCULAR: No chest pain, palpitations, dizziness, or leg swelling  GASTROINTESTINAL: No abdominal pain. No nausea, vomiting, or hematemesis; No diarrhea or constipation. No melena or hematochezia.  GENITOURINARY: No dysuria or hematuria, urinary frequency  NEUROLOGICAL: No headaches, memory loss, loss of strength, numbness, or tremors  SKIN: No itching, burning, rashes, or lesions     Vital Signs Last 24 Hrs  T(C): 37.1 (04 Jan 2022 08:30), Max: 37.1 (04 Jan 2022 08:30)  T(F): 98.7 (04 Jan 2022 08:30), Max: 98.7 (04 Jan 2022 08:30)  HR: 56 (04 Jan 2022 08:30) (53 - 82)  BP: 128/78 (04 Jan 2022 08:30) (110/64 - 164/78)  BP(mean): --  RR: 18 (04 Jan 2022 08:30) (17 - 18)  SpO2: 97% (04 Jan 2022 08:30) (82% - 97%)    PHYSICAL EXAMINATION:  GENERAL: NAD, well built  HEAD:  Atraumatic, Normocephalic  EYES:  conjunctiva and sclera clear  NECK: Supple, No JVD, Normal thyroid  CHEST/LUNG: Clear to auscultation. Clear to percussion bilaterally; No rales, rhonchi, wheezing, or rubs  HEART: Regular rate and rhythm; No murmurs, rubs, or gallops  ABDOMEN: Soft, Nontender, Nondistended; Bowel sounds present  NERVOUS SYSTEM:  Alert & Oriented X3,    EXTREMITIES:  2+ Peripheral Pulses, No clubbing, cyanosis, or edema  SKIN: warm dry                      CAPILLARY BLOOD GLUCOSE      RADIOLOGY & ADDITIONAL TESTS:                   PGY-1 Progress Note discussed with attending    PAGER #: [[phone]] TILL 5:00 PM  PLEASE CONTACT ON CALL TEAM:  - On Call Team (Please refer to [facility]) FROM 5:00 PM - 8:30PM  - Nightfloat Team FROM 8:30 -7:30 AM        INTERVAL HPI/OVERNIGHT EVENTS:  No overnight events. Pt seen and examined at bedside, alert, but not answering questions appropriately. Therefore, cannot assess for ROS      Vital Signs Last 24 Hrs  T(C): 37.1 (04 Jan 2022 08:30), Max: 37.1 (04 Jan 2022 08:30)  T(F): 98.7 (04 Jan 2022 08:30), Max: 98.7 (04 Jan 2022 08:30)  HR: 56 (04 Jan 2022 08:30) (53 - 82)  BP: 128/78 (04 Jan 2022 08:30) (110/64 - 164/78)  BP(mean): --  RR: 18 (04 Jan 2022 08:30) (17 - 18)  SpO2: 97% (04 Jan 2022 08:30) (82% - 97%)    PHYSICAL EXAMINATION:  GENERAL: NAD, thin  HEAD:  Atraumatic, Normocephalic  EYES:  conjunctiva and sclera clear  NECK: Supple, No JVD  CHEST/LUNG: Clear to auscultation. Clear to percussion bilaterally; No rales, rhonchi, wheezing, or rubs  HEART: Regular rate and rhythm; No murmurs, rubs, or gallops  ABDOMEN: Soft, Nontender, Nondistended; Bowel sounds present  NERVOUS SYSTEM:  Alert & Oriented X1   EXTREMITIES:  2+ Peripheral Pulses, No clubbing, cyanosis, or edema  SKIN: warm dry                 CAPILLARY BLOOD GLUCOSE      RADIOLOGY & ADDITIONAL TESTS:

## 2022-01-04 NOTE — PROGRESS NOTE ADULT - SUBJECTIVE AND OBJECTIVE BOX
Patient is seen and examined at the bed side, is afebrile.  She is doing better.      REVIEW OF SYSTEMS: Unable to obtain due to mental status      ALLERGIES: sulfa drugs (Hives)      Vital Signs Last 24 Hrs  T(C): 36.3 (2022 20:15), Max: 37.1 (2022 08:30)  T(F): 97.3 (2022 20:15), Max: 98.7 (2022 08:30)  HR: 73 (2022 20:15) (46 - 78)  BP: 160/67 (2022 20:15) (113/64 - 160/67)  BP(mean): --  RR: 20 (2022 20:15) (17 - 20)  SpO2: 96% (2022 20:15) (95% - 98%)        PHYSICAL EXAM:  GENERAL: Not in distress   CHEST/LUNG:  Not using accessory muscles  HEART: s1 and s2 present  ABDOMEN:  Nontender and  Nondistended  EXTREMITIES: No pedal  edema  CNS: More alert      LABS:  No new Labs                          12.0   7.50  )-----------( 220      ( 2022 10:29 )             36.9                    12.7   6.58  )-----------( 194      ( 2022 08:40 )             39.7                9.8    18.53 )-----------( 138      ( 28 Dec 2021 07:26 )             30.7         01-04    142  |  107  |  6<L>  ----------------------------<  92  2.7<LL>   |  27  |  0.56    Ca    8.1<L>      2022 10:29  Phos  2.4     01-04  Mg     1.7     -04    TPro  5.2<L>  /  Alb  1.8<L>  /  TBili  1.2  /  DBili  x   /  AST  11  /  ALT  13  /  AlkPhos  94  -04    12-28    149<H>  |  121<H>  |  52<H>  ----------------------------<  39<LL>  4.7   |  18<L>  |  2.01<H>    Ca    7.8<L>      28 Dec 2021 07:08  Phos  3.6       Mg     1.7         TPro  5.2<L>  /  Alb  1.7<L>  /  TBili  0.8  /  DBili  x   /  AST  29  /  ALT  33  /  AlkPhos  100  12-    PT/INR - ( 27 Dec 2021 04:52 )   PT: 12.5 sec;   INR: 1.05 ratio    PTT - ( 27 Dec 2021 04:52 )  PTT:29.6 sec        CAPILLARY BLOOD GLUCOSE  POCT Blood Glucose.: 80 mg/dL (27 Dec 2021 04:17)        Urinalysis Basic - ( 26 Dec 2021 20:36 )  Color: Red / Appearance: bloody / S.010 / pH: x  Gluc: x / Ketone: Negative  / Bili: Negative / Urobili: Negative   Blood: x / Protein: 100 / Nitrite: Positive   Leuk Esterase: Moderate / RBC: >50 /HPF / WBC >50 /HPF   Sq Epi: x / Non Sq Epi: Few /HPF / Bacteria: Moderate /HPF        MEDICATIONS  (STANDING    cefepime   IVPB      cefepime   IVPB 500 milliGRAM(s) IV Intermittent every 24 hours  cloNIDine Patch 0.1 mG/24Hr(s) 1 patch Transdermal every 7 days  dextrose 5% + sodium chloride 0.45%. 1000 milliLiter(s) (50 mL/Hr) IV Continuous <Continuous>  heparin   Injectable 5000 Unit(s) SubCutaneous every 8 hours  pantoprazole  Injectable 40 milliGRAM(s) IV Push at bedtime  sodium bicarbonate 650 milliGRAM(s) Oral three times a day        RADIOLOGY & ADDITIONAL TESTS:    21: CT Abdomen and Pelvis No Cont (21 @ 20:03) Significantly limited exam due to motion artifact and noncontrast technique.    2.9 cm stone and 1.3 cm stone at the level of the left renal pelvis  possibly extending into the UPJ, difficult to evaluate. At least mild   left upper pole hydronephrosis and indeterminate lower pole hydronephrosis secondary to possible parapelvic cyst in this location.   Mild left perinephric stranding. Recommend correlation with urinalysis.    21: Xray Chest 1 View-PORTABLE IMMEDIATE (Xray Chest 1 View-PORTABLE IMMEDIATE .) (21 @ 20:52) Right perihilar opacity, differential diagnosis includes focal  consolidation and aortic aneurysm      MICROBIOLOGY DATA:  Culture - Blood (21 @ 13:34)   Specimen Source: .Blood Blood   Culture Results: No growth to date.     Culture - Blood (21 @ 13:34)   Specimen Source: .Blood Blood   Culture Results: No growth to date.     MRSA/MSSA PCR (21 @ 10:45)   MRSA PCR Result.: NotDetec:    Culture - Urine (21 @ 03:36)   - Trimethoprim/Sulfamethoxazole: S <=0.5/9.5   - Cefepime: S <=2   - Cefoxitin: S <=8   - Ceftriaxone: S <=1 Enterobacter, Klebsiella aerogenes, Citrobacter, and Serratia may develop resistance during prolonged therapy   - Ciprofloxacin: S <=0.25   - Ertapenem: S <=0.5   - Gentamicin: S <=2   - Levofloxacin: S <=0.5   - Meropenem: S <=1   - Nitrofurantoin: R >64 Should not be used to treat pyelonephritis   - Piperacillin/Tazobactam: S <=8   - Tigecycline: R <=2   - Tobramycin: S 4   - Amikacin: S <=16   - Amoxicillin/Clavulanic Acid: S <=8/4   - Ampicillin: R >16 These ampicillin results predict results for amoxicillin   - Ampicillin/Sulbactam: S <=4/2 Enterobacter, Klebsiella aerogenes, Citrobacter, and Serratia may develop resistance during prolonged therapy (3-4 days)   - Aztreonam: S <=4   - Cefazolin: R >16   Specimen Source: Clean Catch Clean Catch (Midstream)   Culture Results:   >100,000 CFU/ml Proteus vulgaris group   Organism Identification: Proteus vulgaris group   Culture - Blood (21 @ 12:43)   Specimen Source: .Blood Blood-Peripheral   Culture Results: No growth to date.     Culture - Blood (21 @ 12:43)   Specimen Source: .Blood Blood-Peripheral   Culture Results: No growth to date.     Culture - Urine (21 @ 03:36)   Specimen Source: Clean Catch Clean Catch (Midstream)   Culture Results: >100,000 CFU/ml Proteus vulgaris group     COVID-19 PCR . (.21 @ 20:36)   COVID-19 PCR: NotDetec:

## 2022-01-04 NOTE — PROGRESS NOTE ADULT - ATTENDING COMMENTS
Pt's renal failure has spontaneously resolved conveying the likely possibility of passing an obstructing stone, obviating the need for any minimally invasive procedure / intervention (i.e nephrostomy or ureteral stenting).  Pt will however need to be on a prophylactic regimen of abx given a h/o frequent UTI amidst her known h/o having multiple large nephrolithiases.     No further cardiac w/u indicated, as pt's trop count is likely demand ischemia in combination with the ARF of recent.    Pt's mentation is confirmed to be at her baseline, which vaxes and wanes.    Pt is medically cleared for d/c home with resumption of aids.
Will d/c pt on Augmentin 875mg daily plus Hiprex as prophylaxis against recurring UTis.  Case d/w pts daughter extensively.
Will d/c pt on Augmentin 875mg daily plus Hiprex as prophylaxis against recurring UTis.  Case d/w pts daughter extensively. Pt going home under hospice care network.
Will d/c pt on Augmentin 875mg daily plus Hiprex as prophylaxis against recurring UTis.  Case d/w pts daughter extensively. Pt going home under hospice care network.
Given the concurrent existence of a left hydronephrosis with pt's known h/o profound nephrolithiases,  the  possibility of obstructive uropathy is certain (given progressive uptrending BUN/Crt) meriting the need for nephrostomy by IR.
After extensive d/w urology consult (noted and much appreciated), it appears pt's source of chronic relapsing UTIs are multiple nephrolithiases which can be complicated further by intervention via uroscopy and ureteral stent placement to facilitate its passage, as in all likelihood they would not all pass with one stenting placement. Hence decision has been made to manage the situation with chronic / periodic antibiotic regimen.
Will d/c pt on Augmentin 875mg daily plus Hiprex as prophylaxis against recurring UTis.  Case d/w pts daughter extensively. Pt going home under hospice care network.
Pt at baseline, symptomatically improved. Follow U/C.
Will d/c pt on Augmentin 875mg daily plus Hiprex as prophylaxis against recurring UTis.  Case d/w pts daughter extensively. Pt awaiting going home under hospice care network.

## 2022-01-04 NOTE — PROGRESS NOTE ADULT - PROBLEM SELECTOR PROBLEM 5
Chronic back pain
PATRICK (acute kidney injury)
Chronic back pain

## 2022-01-04 NOTE — PROGRESS NOTE ADULT - PROBLEM SELECTOR PROBLEM 6
Prophylactic measure
Chronic back pain
Prophylactic measure

## 2022-01-04 NOTE — PROGRESS NOTE ADULT - PROBLEM SELECTOR PLAN 1
- Patient with mild dementia at baseline presented with AMS. As per conversation with daughter today pt MS waxes and wanes  - UA is positive   - previously on Rocephin, now on cefepime  - urine culture growing proteous (S) to cefepime  - BCX NGTD (initial)  - Repeat BCX neg  -c/w Cefepime (D9)  - Fall precautions   - Aspiration precautions  - Pt to continue on ABX until 1/6/22 as per ID  -When ready for DC pt will be on Augmentin 875mg daily plus Hiprex as prophylaxis against recurring UTI ·   - palliative Dr. Davidson consulted-New MOLST drafted; DNR/DNI/no feeding tube/trial of IVF/comfort measures only/DNH. Pt to go home on home hospice- authorization still in process, likely tomorrow pt can be DC

## 2022-01-04 NOTE — CHART NOTE - NSCHARTNOTEFT_GEN_A_CORE
89F with advanced dementia, bedbound, complete care continues NPO (day #10). Severe PCM. Initial nutrition assessment by Violet 12/29/21. Seen by SLP12/30 (not passed for PO). Seen by Palliative Care, "no feeding tube". Plan, as noted, is for home hospice, tomorrow. MD to monitor.

## 2022-01-04 NOTE — PROGRESS NOTE ADULT - PROBLEM SELECTOR PLAN 6
IMPROVE VTE score: 3  Will manage with: Heparin S/C    [ ] Previous VTE                                    3  [ ] Thrombophilia                                  2  [ x] Lower limb paralysis                        2  (unable to hold up >15 seconds)    [ ] Current Cancer (within 6 months)        2   [x] Immobilization > 24 hrs                    1  [ ] ICU/CCU stay > 24 hrs                      1  [x] Age > 60                                         1
Heparin 5000u q8 for PATRICK
IMPROVE VTE score: 3  Will manage with: Heparin S/C    [ ] Previous VTE                                    3  [ ] Thrombophilia                                  2  [ x] Lower limb paralysis                        2  (unable to hold up >15 seconds)    [ ] Current Cancer (within 6 months)        2   [x] Immobilization > 24 hrs                    1  [ ] ICU/CCU stay > 24 hrs                      1  [x] Age > 60                                         1
IMPROVE VTE score: 3  Will manage with: Heparin S/C    [ ] Previous VTE                                    3  [ ] Thrombophilia                                  2  [ x] Lower limb paralysis                        2  (unable to hold up >15 seconds)    [ ] Current Cancer (within 6 months)        2   [x] Immobilization > 24 hrs                    1  [ ] ICU/CCU stay > 24 hrs                      1  [x] Age > 60                                         1
- Won't go aggressive with her back pain workup as per daughter  - c/w tylenol
Heparin 5000u q8 for PATRICK
IMPROVE VTE score: 3  Will manage with: Heparin S/C    [ ] Previous VTE                                    3  [ ] Thrombophilia                                  2  [ x] Lower limb paralysis                        2  (unable to hold up >15 seconds)    [ ] Current Cancer (within 6 months)        2   [x] Immobilization > 24 hrs                    1  [ ] ICU/CCU stay > 24 hrs                      1  [x] Age > 60                                         1

## 2022-01-04 NOTE — PROGRESS NOTE ADULT - PROVIDER SPECIALTY LIST ADULT
Cardiology
Infectious Disease
Infectious Disease
Internal Medicine
Infectious Disease
Internal Medicine
Internal Medicine
Cardiology
Infectious Disease
Infectious Disease
Internal Medicine
Internal Medicine
Urology
Cardiology
Infectious Disease
Infectious Disease
Nephrology
Urology
Nephrology
Internal Medicine
Internal Medicine

## 2022-01-04 NOTE — PROGRESS NOTE ADULT - REASON FOR ADMISSION
Altered mental status

## 2022-01-04 NOTE — PROGRESS NOTE ADULT - PROBLEM SELECTOR PROBLEM 7
Discharge planning issues
Prophylactic measure

## 2022-01-04 NOTE — PROGRESS NOTE ADULT - ASSESSMENT
89 years old admitted with PATRCIK and gram negative UTI, pyelonephritis.   Left hydronephrosis with a calculi  UPJ area. Thinning of  left renal parenchyma  due to hydronephrosis with perinephritic strnading  Leukocytosis associated due to the above and on Cefepime  Patient was seen       1. PATRICK Deterioration in renal function due to the above, left hydronephrosis due to a stone in UPJ area and  pyelonephritis.  Follow up with urology and possible nephrostomy tube  Follow daily lab  2.Hypernatremia , possible cause using NS, changed to 1/2 NS and follow lab  3. Non anion gap MA - placed on sodium bicarbonate.  4.UTI and pyelonephritis , Gram negative rods proteus placed on Cefepime as per ID      
89 years old female ( on wheel chair ) with past history of Right arthroplasty, chronic back pain,Dementia previous episodes of UTIs presented today to ED with AMS,UTI,hydronephrosis.  1.UTI,Lt hydro-abx, f/u.  2.Altered mental status-Neurology eval.  3.No further cardiac testing needed.  4.?CRI-IVF,Renal f/u.  5.GI and DVT prophylaxis.  6.Replace k+
Patient is a 89y old  Female ( on wheel chair ) with past history of Right arthroplasty, chronic back pain, previous episodes of UTIs, now brought in to the ER for evaluation of AMS. She was admitted before to [facility] and [facility] for previous UTIs. Patient was seen by neurologist before for her dementia and she was told that is normal for age. On admission, she has no fever but positive Urine analysis. The CXR shows Right perihilar opacity and CT abd/pelvis shows Left hydronephrosis and perinephric stranding. She has started on Cefepime and the ID consult requested to assist with further evaluation and antibiotic management.    # UTI  # Metabolic encephalopathy  # Leukocytosis  #  Left hydronephrosis and perinephric stranding- on CT abd/pelvis  # Pneumonia - on CXR    would recommend:    1. Monitor WBC count, stay elevated  2. Continue Cefepime until work up is done  3. Monitor kidney function and adjust Abx doses accordingly  4. Follow up MRSA PCR, is in process  6. Aspiration precaution    Attending Attestation:    Spent more than 45 minutes on total encounter, more than 50 % of the visit was spent counseling and/or coordinating care by the Attending physician.
Patient is a 89y old  Female ( on wheel chair ) with past history of Right arthroplasty, chronic back pain, previous episodes of UTIs, now brought in to the ER for evaluation of AMS. She was admitted before to [facility] for previous UTIs. Patient was seen by neurologist before for her dementia and she was told that is normal for age. On admission, she has no fever but positive Urine analysis. The CXR shows Right perihilar opacity and CT abd/pelvis shows Left hydronephrosis and perinephric stranding. She has started on Cefepime and the ID consult requested to assist with further evaluation and antibiotic management.    # UTI  # Metabolic encephalopathy- improving   # Leukocytosis- resolved  # Left hydronephrosis and perinephric stranding- on CT abd/pelvis  # Pneumonia - on CXR    would recommend:    1. Monitor kidney function and adjust Abx doses accordingly  2. Continue Cefepime inpatient and May change to oral Augmentin 875 mg q 12hours on discharge  to continue  until 1/6/22  3. Aspiration precaution    Attending Attestation:    Spent more than 35 minutes on total encounter, more than 50 % of the visit was spent counseling and/or coordinating care by the Attending physician.
89 years old female ( on wheel chair ) with past history of Right arthroplasty, chronic back pain,Dementia previous episodes of UTIs presented today to ED with AMS,UTI,hydronephrosis.  1.UTI,Lt hydro-abx.  2.Comfort care as per palliative.  3.?CRI-s/p IVF,Renal f/u.  4.HTN-d/c IV lopressor,add clonidine patch.  5.GI and DVT prophylaxis.  
89 years old female ( on wheel chair ) with past history of Right arthroplasty, chronic back pain,Dementia previous episodes of UTIs presented today to ED with AMS,UTI,hydronephrosis.  1.UTI,Lt hydro-abx.  2.Comfort care as per palliative.  3.?CRI-s/p IVF,Renal f/u.  4.Replace k+  5.HTN-IV lopressor for now.  6.GI and DVT prophylaxis.  
Patient is a 89y old  Female ( on wheel chair ) with past history of Right arthroplasty, chronic back pain, previous episodes of UTIs, now brought in to the ER for evaluation of AMS. She was admitted before to [facility] and [facility] for previous UTIs. Patient was seen by neurologist before for her dementia and she was told that is normal for age. On admission, she has no fever but positive Urine analysis. The CXR shows Right perihilar opacity and CT abd/pelvis shows Left hydronephrosis and perinephric stranding. She has started on Cefepime and the ID consult requested to assist with further evaluation and antibiotic management.    # UTI  # Metabolic encephalopathy  # Leukocytosis- resolved  # Left hydronephrosis and perinephric stranding- on CT abd/pelvis  # Pneumonia - on CXR    would recommend:    1. Continue Cefepime inpatient and May change to oral Augmentin 875 mg q 12hours on discharge  to continue  until 1/6/22  2. Monitor kidney function and adjust Abx doses accordingly  3. Aspiration precaution    Attending Attestation:    Spent more than 35 minutes on total encounter, more than 50 % of the visit was spent counseling and/or coordinating care by the Attending physician.
Patient is a 89y old  Female ( on wheel chair ) with past history of Right arthroplasty, chronic back pain, previous episodes of UTIs, now brought in to the ER for evaluation of AMS. She was admitted before to [facility] and [facility] for previous UTIs. Patient was seen by neurologist before for her dementia and she was told that is normal for age. On admission, she has no fever but positive Urine analysis. The CXR shows Right perihilar opacity and CT abd/pelvis shows Left hydronephrosis and perinephric stranding. She has started on Cefepime and the ID consult requested to assist with further evaluation and antibiotic management.    # UTI  # Metabolic encephalopathy- improving   # Leukocytosis- resolved  # Left hydronephrosis and perinephric stranding- on CT abd/pelvis  # Pneumonia - on CXR    would recommend:    1. Continue Cefepime inpatient and May change to oral Augmentin 875 mg q 12hours on discharge  to continue until 1/6/22  2. Aspiration precaution  3. Monitor kidney function and adjust Abx doses accordingly    Attending Attestation:    Spent more than 35 minutes on total encounter, more than 50 % of the visit was spent counseling and/or coordinating care by the Attending physician.    
Patient is a 89y old  Female ( on wheel chair ) with past history of Right arthroplasty, chronic back pain, previous episodes of UTIs, now brought in to the ER for evaluation of AMS. She was admitted before to [facility] and [facility] for previous UTIs. Patient was seen by neurologist before for her dementia and she was told that is normal for age. On admission, she has no fever but positive Urine analysis. The CXR shows Right perihilar opacity and CT abd/pelvis shows Left hydronephrosis and perinephric stranding. She has started on Cefepime and the ID consult requested to assist with further evaluation and antibiotic management.    # UTI  # Metabolic encephalopathy- improving   # Leukocytosis- resolved  # Left hydronephrosis and perinephric stranding- on CT abd/pelvis  # Pneumonia - on CXR    would recommend:    1. Aspiration precaution  2. Monitor kidney function and adjust Abx doses accordingly  3. Continue Cefepime inpatient and May change to oral Augmentin 875 mg q 12hours on discharge  to continue  until 1/6/22    Attending Attestation:    Spent more than 35 minutes on total encounter, more than 50 % of the visit was spent counseling and/or coordinating care by the Attending physician.
89 years old admitted with PATRICK and gram negative UTI, pyelonephritis.   Left hydronephrosis with a calculi  UPJ area. Thinning of  left renal parenchyma  due to hydronephrosis with perinephritic strnading  Leukocytosis associated due to the above and on Cefepime      1. PATRICK Deterioration in renal function due to the above, left hydronephrosis due to a stone in UPJ area and  pyelonephritis. Repeat creatinine in normal; range  Follow up with urology and possible nephrostomy tube  Follow daily lab  2.Hypernatremia , possible cause using NS, changed to 1/2 d5w and KCL  3. Non anion gap MA - placed on sodium bicarbonate.  4.UTI and pyelonephritis , Gram negative rods proteus placed on Cefepime as per ID      
89 years old female ( on wheel chair ) with past history of Right arthroplasty, chronic back pain,Dementia previous episodes of UTIs presented today to ED with AMS,UTI,hydronephrosis.  1.UTI,Lt hydro-abx.  2.Comfort care as per palliative.  3.?CRI-s/p IVF,Renal f/u.  4.HTN-IV lopressor for now.  5.GI and DVT prophylaxis.  
89 years old female ( on wheel chair ) with past history of Right arthroplasty, chronic back pain,Dementia previous episodes of UTIs presented today to ED with AMS,UTI,hydronephrosis.  1.UTI,Lt hydro-abx.  2.Comfort care as per palliative.  3.?CRI-s/p IVF,Renal f/u.  4.HTN-clonidine patch.  5.GI and DVT prophylaxis.  
89 years old female ( on wheel chair ) with past history of Right arthroplasty, chronic back pain,Dementia previous episodes of UTIs presented today to ED with AMS.   1.D/C tele.  2.?UTI,Lt hydro-abx,, eval.  3.Altered mental status-Neurology eval.  4.No further cardiac testing needed.  5.?CRI-IVF,Renal eval called.  6.GI and DVT prophylaxis.
89F presenting with left hydronephrosis secondary to 3 cm ureteral calculi, now with worsening PATRICK  afebrile    -monitor daily labs  -pain control PRN  -continue michaud, monitor urine output  -IV abx  -if patient clinically worsens may require nephrostomy tube  -remainder of care per primary team   
Patient is a 89y old  Female ( on wheel chair ) with past history of Right arthroplasty, chronic back pain, previous episodes of UTIs, now brought in to the ER for evaluation of AMS. She was admitted before to [facility] and [facility] for previous UTIs. Patient was seen by neurologist before for her dementia and she was told that is normal for age. On admission, she has no fever but positive Urine analysis. The CXR shows Right perihilar opacity and CT abd/pelvis shows Left hydronephrosis and perinephric stranding. She has started on Cefepime and the ID consult requested to assist with further evaluation and antibiotic management.    # UTI  # Metabolic encephalopathy  # Leukocytosis  #  Left hydronephrosis and perinephric stranding- on CT abd/pelvis  # Pneumonia - on CXR    would recommend:    1. Follow up final Urine culture for  sensitivity of Proteus  2. Monitor WBC count, stay elevated  3. Continue Cefepime until work up is done  4. Monitor kidney function and adjust Abx doses accordingly  5. MRSA PCR and Legionella urine AG   6. Aspiration precaution    Attending Attestation:    Spent more than 45 minutes on total encounter, more than 50 % of the visit was spent counseling and/or coordinating care by the Attending physician.    
89 years old female ( on wheel chair ) with past history of Right arthroplasty, chronic back pain,Dementia previous episodes of UTIs presented today to ED with AMS,UTI,hydronephrosis.  1.UTI,Lt hydro-abx.  2.Comfort care as per palliative.  3.?CRI-s/p IVF,Renal f/u.  4.GI and DVT prophylaxis.  
89 years old female ( on wheel chair ) with past history of Right arthroplasty, chronic back pain,Dementia previous episodes of UTIs presented today to ED with AMS,UTI,hydronephrosis..   1.D/C tele.  2.UTI,Lt hydro-abx,, eval noted IR for nephrostomy.  3.Altered mental status-Neurology eval.  4.No further cardiac testing needed.  5.?CRI-IVF,Renal f/u.  6.GI and DVT prophylaxis.
89F presenting with left hydronephrosis secondary to 3 cm ureteral calculi  Afebrile   WBC 15.8 from 18.5  Serum Cr 1.02 from 2.01    -monitor daily labs  -pain control PRN  -continue michaud, monitor urine output  -IV abx  -if patient clinically worsens may require nephrostomy tube  -remainder of care per primary team   
Patient is a 89y old  Female ( on wheel chair ) with past history of Right arthroplasty, chronic back pain, previous episodes of UTIs, now brought in to the ER for evaluation of AMS. She was admitted before to [facility] and [facility] for previous UTIs. Patient was seen by neurologist before for her dementia and she was told that is normal for age. On admission, she has no fever but positive Urine analysis. The CXR shows Right perihilar opacity and CT abd/pelvis shows Left hydronephrosis and perinephric stranding. She has started on Cefepime and the ID consult requested to assist with further evaluation and antibiotic management.    # UTI  # Metabolic encephalopathy- improving   # Leukocytosis- resolved  # Left hydronephrosis and perinephric stranding- on CT abd/pelvis  # Pneumonia - on CXR    would recommend:    1. Continue Cefepime inpatient and May change to oral Augmentin 875 mg q 12hours on discharge  to continue  until 1/6/22  2. Aspiration precaution  3.  Monitor kidney function and adjust Abx doses accordingly    Attending Attestation:    Spent more than 35 minutes on total encounter, more than 50 % of the visit was spent counseling and/or coordinating care by the Attending physician.    
Patient is 89 years old female ( on wheel chair ) with past history of Right arthroplasty, chronic back pain, previous episodes of UTIs presented today to ED with AMS. Patient was found to have positive UA. mildy elevated troponin probably 2/2 poor oral intake. Patient will be admitted for further care.

## 2022-01-04 NOTE — DISCHARGE NOTE NURSING/CASE MANAGEMENT/SOCIAL WORK - PATIENT PORTAL LINK FT
You can access the FollowMyHealth Patient Portal offered by [facility] by registering at the following website: [email or web address]. By joining [employer]’s FollowMyHealth portal, you will also be able to view your health information using other applications (apps) compatible with our system.

## 2022-01-04 NOTE — PROGRESS NOTE ADULT - PROBLEM SELECTOR PLAN 3
- EKG showed T wave abnormalities; NSTEMI  - Patient has poor oral intake;  - c/w IV fluids 0.9% NS 70cc/hr   - Trop 93>335.8>589>568, trending down  - no need to continue trending  - Most likely demand ischemia in the s/o acute infection
- EKG showed T wave abnormalities; NSTEMI  - Patient has poor oral intake;  - c/w IV fluids 0.9% NS 70cc/hr   - Trop 93>335.8  - continue to trend troponin
- EKG showed T wave abnormalities; NSTEMI  - Patient has poor oral intake;  - c/w IV fluids 0.9% NS 70cc/hr   - Trop 93>335.8>589>568, trending down  - no need to continue trending  - Most likely demand ischemia in the s/o acute infection

## 2022-01-04 NOTE — DISCHARGE NOTE NURSING/CASE MANAGEMENT/SOCIAL WORK - NSDCVIVACCINE_GEN_ALL_CORE_FT
influenza, injectable, quadrivalent, preservative free; 18-Nov-2019 14:54; Karina Westfall (RN); [employer]; [id number] (Exp. Date: 30-Jun-2020); IntraMuscular; Deltoid Left.; 0.5 milliLiter(s); VIS (VIS Published: 15-Aug-2019, VIS Presented: 18-Nov-2019);   Tdap; 09-Nov-2019 01:13; Glenda Degroot (RN); [employer]; [id number] (Exp. Date: 17-Sep-2021); IntraMuscular; Deltoid Left.; 0.5 milliLiter(s); VIS (VIS Published: 09-May-2013, VIS Presented: 09-Nov-2019);

## 2022-01-04 NOTE — PROGRESS NOTE ADULT - PROBLEM SELECTOR PLAN 5
- Won't go aggressive with her back pain workup as per daughter  - c/w tylenol
- Could be poor oral intake; Prerenal?  - FeNa 0.5%  - SCr 0.58 (significantly improved) -1/2/22  - c/w IVF-D5 30 cc/hr as pt has poor oral intake
- Won't go aggressive with her back pain workup as per daughter  - c/w tylenol

## 2022-01-04 NOTE — PROGRESS NOTE ADULT - PROBLEM SELECTOR PLAN 7
IMPROVE VTE score: 3  Will manage with: Heparin S/C    [ ] Previous VTE                                    3  [ ] Thrombophilia                                  2  [ x] Lower limb paralysis                        2  (unable to hold up >15 seconds)    [ ] Current Cancer (within 6 months)        2   [x] Immobilization > 24 hrs                    1  [ ] ICU/CCU stay > 24 hrs                      1  [x] Age > 60                                         1

## 2022-01-04 NOTE — DISCHARGE NOTE NURSING/CASE MANAGEMENT/SOCIAL WORK - NSDCPEFALRISK_GEN_ALL_CORE
For information on Fall & Injury Prevention, visit: [email or web address] OR  [email or web address] OR  https://www.cdc.gov/steadi/patient.html

## 2022-01-05 VITALS
HEART RATE: 83 BPM | TEMPERATURE: 98 F | SYSTOLIC BLOOD PRESSURE: 159 MMHG | RESPIRATION RATE: 19 BRPM | DIASTOLIC BLOOD PRESSURE: 76 MMHG | OXYGEN SATURATION: 96 %

## 2022-03-01 NOTE — PROGRESS NOTE ADULT - SUBJECTIVE AND OBJECTIVE BOX
PGY-1 Progress Note discussed with attending    PAGER #: [[phone]] TILL 5:00 PM  PLEASE CONTACT ON CALL TEAM:  - On Call Team (Please refer to [facility]) FROM 5:00 PM - 8:30PM  - Nightfloat Team FROM 8:30 -7:30 AM    CHIEF COMPLAINT & BRIEF HOSPITAL COURSE:    INTERVAL HPI/OVERNIGHT EVENTS:       REVIEW OF SYSTEMS:  CONSTITUTIONAL: No fever, weight loss, or fatigue  RESPIRATORY: No cough, wheezing, chills or hemoptysis; No shortness of breath  CARDIOVASCULAR: No chest pain, palpitations, dizziness, or leg swelling  GASTROINTESTINAL: No abdominal pain. No nausea, vomiting, or hematemesis; No diarrhea or constipation. No melena or hematochezia.  GENITOURINARY: No dysuria or hematuria, urinary frequency  NEUROLOGICAL: No headaches, memory loss, loss of strength, numbness, or tremors  SKIN: No itching, burning, rashes, or lesions     Vital Signs Last 24 Hrs  T(C): 36.1 (29 Dec 2021 07:30), Max: 36.7 (28 Dec 2021 23:30)  T(F): 97 (29 Dec 2021 07:30), Max: 98.1 (28 Dec 2021 23:30)  HR: 78 (29 Dec 2021 07:30) (77 - 94)  BP: 128/71 (29 Dec 2021 07:30) (116/71 - 148/81)  BP(mean): --  RR: 19 (29 Dec 2021 07:30) (19 - 20)  SpO2: 98% (29 Dec 2021 07:30) (96% - 98%)    PHYSICAL EXAMINATION:  GENERAL: NAD, well built  HEAD:  Atraumatic, Normocephalic  EYES:  conjunctiva and sclera clear  NECK: Supple, No JVD, Normal thyroid  CHEST/LUNG: Clear to auscultation. Clear to percussion bilaterally; No rales, rhonchi, wheezing, or rubs  HEART: Regular rate and rhythm; No murmurs, rubs, or gallops  ABDOMEN: Soft, Nontender, Nondistended; Bowel sounds present  NERVOUS SYSTEM:  Alert & Oriented X3,    EXTREMITIES:  2+ Peripheral Pulses, No clubbing, cyanosis, or edema  SKIN: warm dry                          9.8    18.53 )-----------( 138      ( 28 Dec 2021 07:26 )             30.7     12-28    149<H>  |  121<H>  |  52<H>  ----------------------------<  39<LL>  4.7   |  18<L>  |  2.01<H>    Ca    7.8<L>      28 Dec 2021 07:08  Phos  3.6     12-28  Mg     1.7     12-28    TPro  5.2<L>  /  Alb  1.7<L>  /  TBili  0.8  /  DBili  x   /  AST  29  /  ALT  33  /  AlkPhos  100  12-28    LIVER FUNCTIONS - ( 28 Dec 2021 07:08 )  Alb: 1.7 g/dL / Pro: 5.2 g/dL / ALK PHOS: 100 U/L / ALT: 33 U/L DA / AST: 29 U/L / GGT: x                   CAPILLARY BLOOD GLUCOSE      RADIOLOGY & ADDITIONAL TESTS:                   Patient understands condition, prognosis and need for follow up care. PGY-1 Progress Note discussed with attending    PAGER #: [[phone]] TILL 5:00 PM  PLEASE CONTACT ON CALL TEAM:  - On Call Team (Please refer to [facility]) FROM 5:00 PM - 8:30PM  - Nightfloat Team FROM 8:30 -7:30 AM    CHIEF COMPLAINT & BRIEF HOSPITAL COURSE:    INTERVAL HPI/OVERNIGHT EVENTS: No acute events noted overnight.       REVIEW OF SYSTEMS:  CONSTITUTIONAL: No fever, weight loss, or fatigue  RESPIRATORY: No cough, wheezing, chills or hemoptysis; No shortness of breath  CARDIOVASCULAR: No chest pain, palpitations, dizziness, or leg swelling  GASTROINTESTINAL: No abdominal pain. No nausea, vomiting, or hematemesis; No diarrhea or constipation. No melena or hematochezia.  GENITOURINARY: No dysuria or hematuria, urinary frequency  NEUROLOGICAL: No headaches, memory loss, loss of strength, numbness, or tremors  SKIN: No itching, burning, rashes, or lesions     Vital Signs Last 24 Hrs  T(C): 36.1 (29 Dec 2021 07:30), Max: 36.7 (28 Dec 2021 23:30)  T(F): 97 (29 Dec 2021 07:30), Max: 98.1 (28 Dec 2021 23:30)  HR: 78 (29 Dec 2021 07:30) (77 - 94)  BP: 128/71 (29 Dec 2021 07:30) (116/71 - 148/81)  BP(mean): --  RR: 19 (29 Dec 2021 07:30) (19 - 20)  SpO2: 98% (29 Dec 2021 07:30) (96% - 98%)    PHYSICAL EXAMINATION:  GENERAL: NAD, thin  HEAD:  Atraumatic, Normocephalic  EYES:  conjunctiva and sclera clear  NECK: Supple, No JVD  CHEST/LUNG: Clear to auscultation. Clear to percussion bilaterally; No rales, rhonchi, wheezing, or rubs  HEART: Regular rate and rhythm; No murmurs, rubs, or gallops  ABDOMEN: Soft, Nontender, Nondistended; Bowel sounds present  NERVOUS SYSTEM:  Alert & Oriented X0,    EXTREMITIES:  2+ Peripheral Pulses, No clubbing, cyanosis, or edema  SKIN: warm no lesions noted                         9.8    18.53 )-----------( 138      ( 28 Dec 2021 07:26 )             30.7     12-28    149<H>  |  121<H>  |  52<H>  ----------------------------<  39<LL>  4.7   |  18<L>  |  2.01<H>    Ca    7.8<L>      28 Dec 2021 07:08  Phos  3.6     12-28  Mg     1.7     12-28    TPro  5.2<L>  /  Alb  1.7<L>  /  TBili  0.8  /  DBili  x   /  AST  29  /  ALT  33  /  AlkPhos  100  12-28    LIVER FUNCTIONS - ( 28 Dec 2021 07:08 )  Alb: 1.7 g/dL / Pro: 5.2 g/dL / ALK PHOS: 100 U/L / ALT: 33 U/L DA / AST: 29 U/L / GGT: x                   CAPILLARY BLOOD GLUCOSE      RADIOLOGY & ADDITIONAL TESTS:

## 2022-11-14 NOTE — ED CLERICAL - BED REQUESTED
25-Dec-2020 18:52 Health Maintenance Due   Topic Date Due   • COVID-19 Vaccine (1) Never done   • Pneumococcal Vaccine 0-64 (1 - PCV) Never done   • Diabetes Eye Exam  Never done   • Diabetes Foot Exam  02/13/2020   • Influenza Vaccine (1) 09/01/2022   • Diabetes A1C  09/25/2022       Patient is due for topics as listed above but is not proceeding with all listed abovet this time.

## 2024-04-30 NOTE — SWALLOW BEDSIDE ASSESSMENT ADULT - ORAL PHASE
Delayed oral transit time Decreased anterior-posterior movement of the bolus/Delayed oral transit time h/o COPD/ZAYDA (on 2-4 L NC at home) and BiPAP at home   - no wheezing on exam but with cough  - does not appear to be in exacerbation   - CPAP at night known h/o CKD Stage 5, followed by Dr. Montesinos. UOP 10 cc at baseline. Now with worsening SOB and acidosis requiring HD. On admission, labs showed mixed metabolic acidosis with compensated respiratory alkalosis.   - s/p 100 mg IV lasix with minimal UOP  - nephrology following, recommendations appreciated   - underwent dialysis on 4/29 with 1.1L removed (goal was 2L but patient hypotensive)  - continue with HD as per neprology with temp femoral line  - planned for TDC with IR in AM  - started on IVFe for supplementation to be given with HD